# Patient Record
Sex: MALE | Race: BLACK OR AFRICAN AMERICAN | Employment: UNEMPLOYED | URBAN - METROPOLITAN AREA
[De-identification: names, ages, dates, MRNs, and addresses within clinical notes are randomized per-mention and may not be internally consistent; named-entity substitution may affect disease eponyms.]

---

## 2017-01-03 ENCOUNTER — HOSPITAL ENCOUNTER (EMERGENCY)
Age: 32
Discharge: HOME | End: 2017-01-03
Attending: EMERGENCY MEDICINE | Admitting: EMERGENCY MEDICINE
Payer: MEDICARE

## 2017-01-03 ENCOUNTER — APPOINTMENT (OUTPATIENT)
Dept: CT IMAGING | Age: 32
End: 2017-01-03
Attending: EMERGENCY MEDICINE
Payer: MEDICARE

## 2017-01-03 VITALS
TEMPERATURE: 98.3 F | BODY MASS INDEX: 25.26 KG/M2 | WEIGHT: 157.19 LBS | HEART RATE: 85 BPM | RESPIRATION RATE: 16 BRPM | OXYGEN SATURATION: 100 % | DIASTOLIC BLOOD PRESSURE: 89 MMHG | SYSTOLIC BLOOD PRESSURE: 123 MMHG | HEIGHT: 66 IN

## 2017-01-03 DIAGNOSIS — N12 PYELONEPHRITIS: Primary | ICD-10-CM

## 2017-01-03 DIAGNOSIS — Q62.7 CONGENITAL VESICOURETERAL REFLUX: ICD-10-CM

## 2017-01-03 DIAGNOSIS — N18.6 ESRD (END STAGE RENAL DISEASE) (HCC): ICD-10-CM

## 2017-01-03 DIAGNOSIS — N13.30 BILATERAL HYDRONEPHROSIS: ICD-10-CM

## 2017-01-03 LAB
ALBUMIN SERPL BCP-MCNC: 3.4 G/DL (ref 3.5–5)
ALBUMIN/GLOB SERPL: 0.6 {RATIO} (ref 1.1–2.2)
ALP SERPL-CCNC: 85 U/L (ref 45–117)
ALT SERPL-CCNC: 14 U/L (ref 12–78)
ANION GAP BLD CALC-SCNC: 11 MMOL/L (ref 5–15)
APPEARANCE UR: ABNORMAL
AST SERPL W P-5'-P-CCNC: 17 U/L (ref 15–37)
BACTERIA URNS QL MICRO: ABNORMAL /HPF
BASOPHILS # BLD AUTO: 0 K/UL (ref 0–0.1)
BASOPHILS # BLD: 0 % (ref 0–1)
BILIRUB SERPL-MCNC: 0.4 MG/DL (ref 0.2–1)
BILIRUB UR QL: NEGATIVE
BUN SERPL-MCNC: 82 MG/DL (ref 6–20)
BUN/CREAT SERPL: 9 (ref 12–20)
CALCIUM SERPL-MCNC: 7.4 MG/DL (ref 8.5–10.1)
CHLORIDE SERPL-SCNC: 112 MMOL/L (ref 97–108)
CO2 SERPL-SCNC: 17 MMOL/L (ref 21–32)
COLOR UR: ABNORMAL
CREAT SERPL-MCNC: 9.63 MG/DL (ref 0.7–1.3)
EOSINOPHIL # BLD: 0.1 K/UL (ref 0–0.4)
EOSINOPHIL NFR BLD: 1 % (ref 0–7)
EPITH CASTS URNS QL MICRO: ABNORMAL /LPF
ERYTHROCYTE [DISTWIDTH] IN BLOOD BY AUTOMATED COUNT: 13.9 % (ref 11.5–14.5)
GLOBULIN SER CALC-MCNC: 5.6 G/DL (ref 2–4)
GLUCOSE SERPL-MCNC: 93 MG/DL (ref 65–100)
GLUCOSE UR STRIP.AUTO-MCNC: NEGATIVE MG/DL
HCT VFR BLD AUTO: 40.3 % (ref 36.6–50.3)
HGB BLD-MCNC: 13.7 G/DL (ref 12.1–17)
HGB UR QL STRIP: ABNORMAL
KETONES UR QL STRIP.AUTO: NEGATIVE MG/DL
LEUKOCYTE ESTERASE UR QL STRIP.AUTO: ABNORMAL
LYMPHOCYTES # BLD AUTO: 31 % (ref 12–49)
LYMPHOCYTES # BLD: 3 K/UL (ref 0.8–3.5)
MCH RBC QN AUTO: 30 PG (ref 26–34)
MCHC RBC AUTO-ENTMCNC: 34 G/DL (ref 30–36.5)
MCV RBC AUTO: 88.2 FL (ref 80–99)
MONOCYTES # BLD: 0.9 K/UL (ref 0–1)
MONOCYTES NFR BLD AUTO: 9 % (ref 5–13)
NEUTS SEG # BLD: 5.7 K/UL (ref 1.8–8)
NEUTS SEG NFR BLD AUTO: 59 % (ref 32–75)
NITRITE UR QL STRIP.AUTO: NEGATIVE
PH UR STRIP: 6 [PH] (ref 5–8)
PLATELET # BLD AUTO: 267 K/UL (ref 150–400)
POTASSIUM SERPL-SCNC: 4.1 MMOL/L (ref 3.5–5.1)
PROT SERPL-MCNC: 9 G/DL (ref 6.4–8.2)
PROT UR STRIP-MCNC: 100 MG/DL
RBC # BLD AUTO: 4.57 M/UL (ref 4.1–5.7)
RBC #/AREA URNS HPF: ABNORMAL /HPF (ref 0–5)
SODIUM SERPL-SCNC: 140 MMOL/L (ref 136–145)
SP GR UR REFRACTOMETRY: 1.01 (ref 1–1.03)
UROBILINOGEN UR QL STRIP.AUTO: 0.2 EU/DL (ref 0.2–1)
WBC # BLD AUTO: 9.7 K/UL (ref 4.1–11.1)
WBC URNS QL MICRO: >100 /HPF (ref 0–4)

## 2017-01-03 PROCEDURE — 80053 COMPREHEN METABOLIC PANEL: CPT | Performed by: EMERGENCY MEDICINE

## 2017-01-03 PROCEDURE — 99283 EMERGENCY DEPT VISIT LOW MDM: CPT

## 2017-01-03 PROCEDURE — 85025 COMPLETE CBC W/AUTO DIFF WBC: CPT | Performed by: EMERGENCY MEDICINE

## 2017-01-03 PROCEDURE — 36415 COLL VENOUS BLD VENIPUNCTURE: CPT | Performed by: EMERGENCY MEDICINE

## 2017-01-03 PROCEDURE — 96365 THER/PROPH/DIAG IV INF INIT: CPT

## 2017-01-03 PROCEDURE — 74011250636 HC RX REV CODE- 250/636: Performed by: EMERGENCY MEDICINE

## 2017-01-03 PROCEDURE — 74011000258 HC RX REV CODE- 258: Performed by: EMERGENCY MEDICINE

## 2017-01-03 PROCEDURE — 81001 URINALYSIS AUTO W/SCOPE: CPT | Performed by: EMERGENCY MEDICINE

## 2017-01-03 PROCEDURE — 74176 CT ABD & PELVIS W/O CONTRAST: CPT

## 2017-01-03 PROCEDURE — 96375 TX/PRO/DX INJ NEW DRUG ADDON: CPT

## 2017-01-03 RX ORDER — CEPHALEXIN 250 MG/1
250 CAPSULE ORAL DAILY
Qty: 7 CAP | Refills: 0 | Status: SHIPPED | OUTPATIENT
Start: 2017-01-03 | End: 2017-01-03

## 2017-01-03 RX ORDER — MORPHINE SULFATE 2 MG/ML
6 INJECTION, SOLUTION INTRAMUSCULAR; INTRAVENOUS
Status: COMPLETED | OUTPATIENT
Start: 2017-01-03 | End: 2017-01-03

## 2017-01-03 RX ORDER — HYDROCODONE BITARTRATE AND ACETAMINOPHEN 7.5; 325 MG/1; MG/1
1 TABLET ORAL
Status: DISCONTINUED | OUTPATIENT
Start: 2017-01-03 | End: 2017-01-04 | Stop reason: HOSPADM

## 2017-01-03 RX ORDER — CEPHALEXIN 250 MG/1
250 CAPSULE ORAL DAILY
Qty: 7 CAP | Refills: 0 | Status: SHIPPED | OUTPATIENT
Start: 2017-01-03 | End: 2017-01-10

## 2017-01-03 RX ORDER — HYDROCODONE BITARTRATE AND ACETAMINOPHEN 7.5; 325 MG/1; MG/1
1 TABLET ORAL
Qty: 10 TAB | Refills: 0 | Status: ON HOLD | OUTPATIENT
Start: 2017-01-03 | End: 2018-09-21

## 2017-01-03 RX ORDER — ONDANSETRON 2 MG/ML
4 INJECTION INTRAMUSCULAR; INTRAVENOUS
Status: COMPLETED | OUTPATIENT
Start: 2017-01-03 | End: 2017-01-03

## 2017-01-03 RX ADMIN — Medication 6 MG: at 21:22

## 2017-01-03 RX ADMIN — ONDANSETRON 4 MG: 2 INJECTION INTRAMUSCULAR; INTRAVENOUS at 21:22

## 2017-01-03 RX ADMIN — CEFTRIAXONE 1 G: 1 INJECTION, POWDER, FOR SOLUTION INTRAMUSCULAR; INTRAVENOUS at 21:22

## 2017-01-04 NOTE — ED NOTES
Pt discharged with written instructions and prescriptions at this time. Pt verbalizes understanding and all questions were answered. Discharged by Winnie Marx, in stable condition, ambulatory.

## 2017-01-04 NOTE — ED PROVIDER NOTES
HPI Comments: Caryle Dach is a 28 y.o. male with PMHx significant for ESRD who presents ambulatory to [id number] [facility] ED with cc of intermittent R flank and RLQ abdominal pain for 2 months, worse in the past 2-3 weeks. Pt describes the pain as a throbbing sensation that occurs with urination or sudden movements. Pt acknowledges additional symptoms of nausea with PO intake, 1 episode of vomiting yesterday, ~4-5 episodes of diarrhea/day for 2 days, decreased appetite, subjective fever, and intermittent diaphoresis. He notes having an episode of hematuria last week, but none since. Pt denies hx of appendectomy or cholecystectomy. Of note, pt has been noncompliant with his dialysis which he has not been to in over 1 year, but is trying to re-start dialysis. Pt denies dysuria, cough, CP, or back pain. PCP: Lui Ferrara MD  Nephrology: Dr Stubbs/Dr Erendira Campoverde Hx: + tobacco (every day), -EtOH (-), - illicit drugs    There are no other complaints, changes or physical findings at this time. The history is provided by the patient. No  was used.         Past Medical History:   Diagnosis Date    Acute renal failure (ARF) ([address] 7/30/2015    Chronic kidney disease      pt on hemodialysis d/t reflux    Congenital hydroureteronephrosis     Crohn disease ([address]     DVT (deep venous thrombosis) (HCC)     ESRD (end stage renal disease) ([facility]      on HD [id number]    Gastrointestinal disorder      Chron's    VUR (vesicoureteric reflux)        Past Surgical History:   Procedure Laterality Date    Hx other surgical  Pt has a filter for DVT's    Hx other surgical       Marquis placed 3 weeks ago    Hx vascular access  1/14/14     CREATION LEFT UPPPER ARM ARTERIO VENOUS GRAFT   (7mm PTFE)         Family History:   Problem Relation Age of Onset    Heart Disease Other     Kidney Disease Other      kidney stones       Social History     Social History    Marital status:      Spouse name: N/A   [facility] Number of children: N/A    Years of education: N/A     Occupational History    Not on file. Social History Main Topics    Smoking status: Current Every Day Smoker    Smokeless tobacco: Never Used      Comment: quit about a year ago     Alcohol use No    Drug use: No    Sexual activity: Not on file     Other Topics Concern    Not on file     Social History Narrative         ALLERGIES: Codeine; Iodinated contrast media - oral and iv dye; and Shellfish derived    Review of Systems   Constitutional: Positive for appetite change (decrease), diaphoresis and fever (subjective). Negative for chills and fatigue. HENT: Negative for congestion, rhinorrhea and sore throat. Eyes: Negative for pain, discharge and visual disturbance. Respiratory: Negative for cough, chest tightness, shortness of breath and wheezing. Cardiovascular: Negative for chest pain, palpitations and leg swelling. Gastrointestinal: Positive for abdominal pain (RLQ), diarrhea, nausea and vomiting. Negative for constipation. Genitourinary: Positive for flank pain (right). Negative for dysuria, frequency and hematuria. Musculoskeletal: Negative for arthralgias, back pain and myalgias. Skin: Negative for rash. Neurological: Negative for dizziness, weakness, light-headedness and headaches. Psychiatric/Behavioral: Negative. Vitals:    01/03/17 1750 01/03/17 2130   BP: (!) 150/95 123/89   Pulse: 85    Resp: 16    Temp: 98.3 °F (36.8 °C)    SpO2: 100% 100%   Weight: 71.3 kg (157 lb 3 oz)    Height: 5' 6\" (1.676 m)             Physical Exam   Constitutional: He is oriented to person, place, and time. He appears well-developed and well-nourished. No distress. HENT:   Head: Normocephalic and atraumatic. Eyes: EOM are normal. Right eye exhibits no discharge. Left eye exhibits no discharge. No scleral icterus. Neck: Normal range of motion. Neck supple. No tracheal deviation present.    Cardiovascular: Normal rate, regular rhythm, normal heart sounds and intact distal pulses. Exam reveals no gallop and no friction rub. No murmur heard. Pulmonary/Chest: Effort normal and breath sounds normal. No respiratory distress. He has no wheezes. He has no rales. Abdominal: Soft. He exhibits no distension. There is no rebound and no guarding. Diffuse right sided abdominal tenderness, Right flank tenderness   Musculoskeletal: Normal range of motion. He exhibits no edema. Lymphadenopathy:     He has no cervical adenopathy. Neurological: He is alert and oriented to person, place, and time. Skin: Skin is warm and dry. No rash noted. Psychiatric: He has a normal mood and affect. MDM  Number of Diagnoses or Management Options  Bilateral hydronephrosis:   Congenital vesicoureteral reflux:   ESRD (end stage renal disease) ([facility]):   Pyelonephritis:   Diagnosis management comments:     Patient presents to ED with right flank pain. He has a history of UTIs secondary to vesicoureteral reflux. He has also been noncompliant with HD. Differential includes pyelonephritis, UTI, nephrolithiasis, appendicitis, cholecystitis, choledocholithiasis, bowel obstruction  - CBC, CMP, UA  - Renal colic CT  - Discuss with nephrology to arrange follow up for HD; he has no current access  - Symptomatic management and reevaluate         Amount and/or Complexity of Data Reviewed  Clinical lab tests: ordered and reviewed  Tests in the radiology section of CPT®: ordered and reviewed  Review and summarize past medical records: yes  Discuss the patient with other providers: yes (Nephrology.)    Patient Progress  Patient progress: stable    ED Course       Procedures      CONSULT NOTE:   9:19 PM  Patrick Marquez MD spoke with Dr. Patrizia Key,   Specialty: nephrology   Discussed pt's hx, disposition, and available diagnostic and imaging results. Reviewed care plans. Consultant agrees with plans as outlined.   Dr. Patrizia Key agrees to evaluate pt and give him her card for further follow-up  Written by MARYA Pierce, as dictated by Rc Hernandez MD.    PROGRESS NOTE:  74:52 PM  Renal colic CT indicates bilateral hydronephrosis but this is consistent with prior CT scans. Dr. Jason Casey has evaluated patient and states he does not need emergent HD; she provided him with information for follow up. Will treat for pyelonephritis; patient is nontoxic appearing and afebrile so will treat on outpatient basis. .  Discussed dosing of keflex with pharmacist.  Creatinine clearance is 11. Will treat with 250 mg of keflex daily. Discussed results, prescriptions and follow up plan with patient. Provided customary return to ED instructions. Patient expressed understanding. Cathy Hoskins MD     LABORATORY TESTS:  Recent Results (from the past 12 hour(s))   URINALYSIS W/MICROSCOPIC    Collection Time: 01/03/17  6:06 PM   Result Value Ref Range    Color YELLOW/STRAW      Appearance TURBID (A) CLEAR      Specific gravity 1.010 1.003 - 1.030      pH (UA) 6.0 5.0 - 8.0      Protein 100 (A) NEG mg/dL    Glucose NEGATIVE  NEG mg/dL    Ketone NEGATIVE  NEG mg/dL    Bilirubin NEGATIVE  NEG      Blood LARGE (A) NEG      Urobilinogen 0.2 0.2 - 1.0 EU/dL    Nitrites NEGATIVE  NEG      Leukocyte Esterase LARGE (A) NEG      WBC >100 (H) 0 - 4 /hpf    RBC 5-10 0 - 5 /hpf    Epithelial cells MODERATE (A) FEW /lpf    Bacteria 2+ (A) NEG /hpf   CBC WITH AUTOMATED DIFF    Collection Time: 01/03/17  6:34 PM   Result Value Ref Range    WBC 9.7 4.1 - 11.1 K/uL    RBC 4.57 4.10 - 5.70 M/uL    HGB 13.7 12.1 - 17.0 g/dL    HCT 40.3 36.6 - 50.3 %    MCV 88.2 80.0 - 99.0 FL    MCH 30.0 26.0 - 34.0 PG    MCHC 34.0 30.0 - 36.5 g/dL    RDW 13.9 11.5 - 14.5 %    PLATELET 378 762 - 665 K/uL    NEUTROPHILS 59 32 - 75 %    LYMPHOCYTES 31 12 - 49 %    MONOCYTES 9 5 - 13 %    EOSINOPHILS 1 0 - 7 %    BASOPHILS 0 0 - 1 %    ABS. NEUTROPHILS 5.7 1.8 - 8.0 K/UL    ABS.  LYMPHOCYTES 3.0 0.8 - 3.5 K/UL ABS. MONOCYTES 0.9 0.0 - 1.0 K/UL    ABS. EOSINOPHILS 0.1 0.0 - 0.4 K/UL    ABS. BASOPHILS 0.0 0.0 - 0.1 K/UL   METABOLIC PANEL, COMPREHENSIVE    Collection Time: 01/03/17  6:34 PM   Result Value Ref Range    Sodium 140 136 - 145 mmol/L    Potassium 4.1 3.5 - 5.1 mmol/L    Chloride 112 (H) 97 - 108 mmol/L    CO2 17 (L) 21 - 32 mmol/L    Anion gap 11 5 - 15 mmol/L    Glucose 93 65 - 100 mg/dL    BUN 82 (H) 6 - 20 MG/DL    Creatinine 9.63 (H) 0.70 - 1.30 MG/DL    BUN/Creatinine ratio 9 (L) 12 - 20      GFR est AA 8 (L) >60 ml/min/1.73m2    GFR est non-AA 6 (L) >60 ml/min/1.73m2    Calcium 7.4 (L) 8.5 - 10.1 MG/DL    Bilirubin, total 0.4 0.2 - 1.0 MG/DL    ALT 14 12 - 78 U/L    AST 17 15 - 37 U/L    Alk. phosphatase 85 45 - 117 U/L    Protein, total 9.0 (H) 6.4 - 8.2 g/dL    Albumin 3.4 (L) 3.5 - 5.0 g/dL    Globulin 5.6 (H) 2.0 - 4.0 g/dL    A-G Ratio 0.6 (L) 1.1 - 2.2         IMAGING RESULTS:  CT ABD PELV WO CONT   Final Result   Study Result      EXAM: CT ABDOMEN PELVIS WITHOUT CONTRAST  INDICATION: right flank pain, right abdominal pain  COMPARISON: None. .  TECHNIQUE:   Unenhanced multislice helical CT was performed from the diaphragm to the  symphysis pubis without intravenous contrast administration. Contiguous 5 mm  axial images were reconstructed and lung and soft tissue windows were generated. Coronal and sagittal reformations were generated. CT dose reduction was achieved through use of a standardized protocol tailored  for this examination and automatic exposure control for dose modulation. Jessee Wilkerson FINDINGS:  INCIDENTALLY IMAGED CHEST:  Heart/vessels: Within normal limits. Lungs/Pleura: Within normal limits. .  ABDOMEN:  Liver: Small, low-attenuation lesion in the anterior aspect of the right lobe of  the liver which is incompletely characterized and likely too small to actually  characterize. Gallbladder/Biliary: Within normal limits. Spleen: Within normal limits.   Pancreas: Within normal limits. Adrenals: Within normal limits. Kidneys: Marked, bilateral hydronephrosis. Peritoneum/Mesenteries: Within normal limits. Extraperitoneum: Within normal limits. Gastrointestinal tract: Suture material adjacent to the cecum/terminal ileum. Vascular: IVC filter. Meron Ragland PELVIS:  Extraperitoneum: Calcifications in the floor the pelvis suggesting phleboliths. Ureters: Bilateral hydroureter. Bladder: Small bladder with irregular contour and lumen. Reproductive System: Within normal limits. .  MSK:   Within normal limits. .  IMPRESSION  IMPRESSION:   1. Bilateral hydroureteronephrosis. The bladder appears small with an irregular  contour and lumen. Recommend clinical correlation. Recommend comparison with  previous imaging  2. IVC filter present. 3. Postsurgical changes of the terminal ileum/cecum.                MEDICATIONS GIVEN:  Medications   cefTRIAXone (ROCEPHIN) 1 g in 0.9% sodium chloride (MBP/ADV) 50 mL (0 g IntraVENous IV Completed 1/3/17 2152)   morphine injection 6 mg (6 mg IntraVENous Given 1/3/17 2122)   ondansetron (ZOFRAN) injection 4 mg (4 mg IntraVENous Given 1/3/17 2122)       IMPRESSION:  1. Pyelonephritis    2. ESRD (end stage renal disease) ([facility]    3. Congenital vesicoureteral reflux    4. Bilateral hydronephrosis        PLAN:  1. Current Discharge Medication List      START taking these medications    Details   cephALEXin (KEFLEX) 250 mg capsule Take 1 Cap by mouth daily for 7 days. Qty: 7 Cap, Refills: 0      HYDROcodone-acetaminophen (NORCO) 7.5-325 mg per tablet Take 1 Tab by mouth every six (6) hours as needed for Pain. Max Daily Amount: 4 Tabs. Qty: 10 Tab, Refills: 0         CONTINUE these medications which have NOT CHANGED    Details   warfarin (COUMADIN) 5 mg tablet Take 5 mg by mouth daily. apixaban (ELIQUIS) 5 mg tablet Take 1 Tab by mouth two (2) times a day.   Qty: 60 Tab, Refills: 0         STOP taking these medications       HYDROcodone-acetaminophen (NORCO) 5-325 mg per tablet Comments:   Reason for Stoppin.   Follow-up Information     Follow up With Details Comments Contact Info    Lui Ferrara MD In 2 days Please follow up with your primary care provider for reevaluation Patient can only remember the practice name and not the physician      Jovany Bravo MD  Please follow up with nephrology to discuss dialysis as soon as possible [address] [facility] [address]  [facility]  [phone]      [facility] EMERGENCY DEPT  As needed, If symptoms worsen [address] [place] [address]  [phone]        Return to ED if worse     DISCHARGE NOTE  11:01 PM  The patient has been re-evaluated and is ready for discharge. Reviewed available results with patient. Counseled pt on diagnosis and care plan. Pt has expressed understanding, and all questions have been answered. Pt agrees with plan and agrees to F/U as recommended, or return to the ED if their sxs worsen. Discharge instructions have been provided and explained to the pt, along with reasons to return to the ED. This note is prepared by Siddharth Kelly acting as Scribe for Anabella Noel MD.    Anabella Noel MD: The scribe's documentation has been prepared under my direction and personally reviewed by me in its entirety. I confirm that the note above accurately reflects all work, treatment, procedures, and medical decision making performed by me.

## 2017-01-04 NOTE — ED NOTES
Assumed care of pt at this time, received bedside report from [facility], [address] [place] [address] with pt included in care. Pt is resting in room, with call bell in reach. All questions answered.

## 2017-01-04 NOTE — CONSULTS
Renal -    Asked to see Mr. Sergio Rodriguez prior to discharge from the ER. Mr. Sergio Rodriguez has a h/o esrd. He has been on HD in the past, but has stopped HD on his own on a couple of occasions. He has been discharged from [facility] in the past (more than once). Has been seen at [facility] in the past as well. Per the chart and per Mr. Sergio Rodriguez he has had very poor adherence. He states that his support system is much better and he understands the need for HD much better now. He had a significant other with him who stated she would help make sure he attended HD. No acute need for RRT at this time. He lives near the [facility] HD unit. I will discuss with Dr. Eldred Angelucci tomorrow.       Cr Zaragoza

## 2017-01-04 NOTE — DISCHARGE INSTRUCTIONS
Kidney Infection: Care Instructions  Your Care Instructions  A kidney infection (pyelonephritis) is a type of urinary tract infection, or UTI. Most UTIs are bladder infections. Kidney infections tend to make people much sicker than bladder infections do. A kidney infection is also more serious because it can cause lasting damage if it is not treated quickly. Follow-up care is a key part of your treatment and safety. Be sure to make and go to all appointments, and call your doctor if you are having problems. Its also a good idea to know your test results and keep a list of the medicines you take. How can you care for yourself at home? · Take your antibiotics as directed. Do not stop taking them just because you feel better. You need to take the full course of antibiotics. · Drink plenty of water, enough so that your urine is light yellow or clear like water. This may help wash out bacteria that are causing the infection. If you have kidney, heart, or liver disease and have to limit fluids, talk with your doctor before you increase the amount of fluids you drink. · Urinate often. Try to empty your bladder each time. · To relieve pain, take a hot shower or lay a heating pad (set on low) over your lower belly. Never go to sleep with a heating pad in place. Put a thin cloth between the heating pad and your skin. To help prevent kidney infections  · Drink plenty of water each day. This helps you urinate often, which clears bacteria from your system. If you have kidney, heart, or liver disease and have to limit fluids, talk with your doctor before you increase the amount of fluids you drink. · Include cranberry juice in your diet. · Urinate when you have the urge. Do not hold your urine for a long time. Urinate before you go to sleep. · If you have symptoms of a bladder infection, such as burning when you urinate or having to urinate often, call your doctor so you can treat the problem before it gets worse. If you do not treat a bladder infection quickly, it can spread to the kidney. · Men should keep the tip of the penis clean. If you are a woman, keep these ideas in mind:  · Urinate right after you have sex. · Change sanitary pads often. Avoid douches, feminine hygiene sprays, and other feminine hygiene products that have deodorants. · After going to the bathroom, wipe from front to back. When should you call for help? Call your doctor now or seek immediate medical care if:  · You have increasing pain in your back just below the rib cage. This is called flank pain. · You have a new or higher fever and chills. · You are vomiting or nauseated. Watch closely for changes in your health, and be sure to contact your doctor if:  · Symptoms, such as burning when you urinate, get worse or get better but then come back. · You are not getting better after 2 days. Where can you learn more? Go to http://roselyn-francia.info/. Enter Y514 in the search box to learn more about \"Kidney Infection: Care Instructions. \"  Current as of: November 20, 2015  Content Version: 11.1  © 9203-9995 [employer]. Care instructions adapted under license by [employer] (which disclaims liability or warranty for this information). If you have questions about a medical condition or this instruction, always ask your healthcare professional. [place] [address] any warranty or liability for your use of this information.

## 2018-09-20 NOTE — PERIOP NOTES
[facility]  Preoperative Instructions        Surgery Date 9/21/18          Time of Arrival 5:45am     1. On the day of your surgery, please report to the Surgical Services Registration Desk and sign in at your designated time. The Surgery Center is located to the right of the Emergency Room. 2. You must have someone with you to drive you home. You should not drive a car for 24 hours following surgery. Please make arrangements for a friend or family member to stay with you for the first 24 hours after your surgery. 3. Do not have anything to eat or drink (including water, gum, mints, coffee, juice) after midnight 9/20/18      . ? This may not apply to medications prescribed by your physician. ?(Please note below the special instructions with medications to take the morning of your procedure.)    4. We recommend you do not drink any alcoholic beverages for 24 hours before and after your surgery. 5. Contact your surgeons office for instructions on the following medications: non-steroidal anti-inflammatory drugs (i.e. Advil, Aleve), vitamins, and supplements. (Some surgeons will want you to stop these medications prior to surgery and others may allow you to take them)  **If you are currently taking Plavix, Coumadin, Aspirin and/or other blood-thinning agents, contact your surgeon for instructions. ** Your surgeon will partner with the physician prescribing these medications to determine if it is safe to stop or if you need to continue taking. Please do not stop taking these medications without instructions from your surgeon    6. Wear comfortable clothes. Wear glasses instead of contacts. Do not bring any money or jewelry. Please bring picture ID, insurance card, and any prearranged co-payment or hospital payment. Do not wear make-up, particularly mascara the morning of your surgery. Do not wear nail polish, particularly if you are having foot /hand surgery.   Wear your hair loose or down, no ponytails, buns, ella pins or clips. All body piercings must be removed. Please shower with antibacterial soap for three consecutive days before and on the morning of surgery, but do not apply any lotions, powders or deodorants after the shower on the day of surgery. Please use a fresh towels after each shower. Please sleep in clean clothes and change bed linens the night before surgery. Please do not shave for 48 hours prior to surgery. Shaving of the face is acceptable. 7. You should understand that if you do not follow these instructions your surgery may be cancelled. If your physical condition changes (I.e. fever, cold or flu) please contact your surgeon as soon as possible. 8. It is important that you be on time. If a situation occurs where you may be late, please call [phone] (OR Holding Area). 9. If you have any questions and or problems, please call [phone] (Pre-admission Testing). 10. Your surgery time may be subject to change. You will receive a phone call the evening prior if your time changes. 11.  If having outpatient surgery, you must have someone to drive you here, stay with you during the duration of your stay, and to drive you home at time of discharge. 12.   In an effort to improve the efficiency, privacy, and safety for all of our Pre-op patients visitors are not allowed in the Holding area. Once you arrive and are registered your family/visitors will be asked to remain in the waiting room. The Pre-op staff will get you from the Surgical Waiting Area and will explain to you and your family/visitors that the Pre-op phase is beginning. The staff will answer any questions and provide instructions for tracking of the patient, by use of the existing tracking number and color-coded status board in the waiting room.   At this time the staff will also ask for your designated spokesperson information in the event that the physician or staff need to provide an update or obtain any pertinent information. The designated spokesperson will be notified if the physician needs to speak to family during the pre-operative phase. If at any time your family/visitors has questions or concerns they may approach the volunteer desk in the waiting area for assistance. Special Instructions:    MEDICATIONS TO TAKE THE MORNING OF SURGERY WITH A SIP OF WATER: None      I understand a pre-operative phone call will be made to verify my surgery time. In the event that I am not available, I give permission for a message to be left on my answering service and/or with another person?  Yes          ___________________      __________   _________    (Signature of Patient)             (Witness)                (Date and Time)

## 2018-09-20 NOTE — PERIOP NOTES
Pt assessment complete. Pt stated he \"did not have an EKG\". Last EKG in CC is from 2015. Pt at dialysis currently and can not have EKG done today. Dr Salomón Ferrara office called and made Ayad Casey aware. Office informed PAT to have EKG done AM DOS. PAT will attempt to see if any EKG has been done recently at [facility] (fax request sent). Both Dr Carolynn Ahumada office and pt's dialysis center were called and neither have any EKG.

## 2018-09-21 ENCOUNTER — ANESTHESIA EVENT (OUTPATIENT)
Dept: SURGERY | Age: 33
End: 2018-09-21
Payer: MEDICARE

## 2018-09-21 ENCOUNTER — HOSPITAL ENCOUNTER (OUTPATIENT)
Age: 33
Setting detail: OUTPATIENT SURGERY
Discharge: HOME | End: 2018-09-21
Attending: SURGERY | Admitting: SURGERY
Payer: MEDICARE

## 2018-09-21 ENCOUNTER — ANESTHESIA (OUTPATIENT)
Dept: SURGERY | Age: 33
End: 2018-09-21
Payer: MEDICARE

## 2018-09-21 VITALS
BODY MASS INDEX: 23.51 KG/M2 | SYSTOLIC BLOOD PRESSURE: 117 MMHG | DIASTOLIC BLOOD PRESSURE: 62 MMHG | RESPIRATION RATE: 16 BRPM | HEIGHT: 69 IN | TEMPERATURE: 97.7 F | WEIGHT: 158.73 LBS | OXYGEN SATURATION: 99 % | HEART RATE: 74 BPM

## 2018-09-21 DIAGNOSIS — Z99.2 ESRD ON DIALYSIS (HCC): Primary | ICD-10-CM

## 2018-09-21 DIAGNOSIS — N18.6 ESRD ON DIALYSIS (HCC): Primary | ICD-10-CM

## 2018-09-21 LAB
ANION GAP BLD CALC-SCNC: 19 MMOL/L (ref 10–20)
ATRIAL RATE: 53 BPM
BUN BLD-MCNC: 18 MG/DL (ref 9–20)
CA-I BLD-MCNC: 0.94 MMOL/L (ref 1.12–1.32)
CALCULATED P AXIS, ECG09: 67 DEGREES
CALCULATED R AXIS, ECG10: 16 DEGREES
CALCULATED T AXIS, ECG11: 45 DEGREES
CHLORIDE BLD-SCNC: 102 MMOL/L (ref 98–107)
CO2 BLD-SCNC: 27 MMOL/L (ref 21–32)
CREAT BLD-MCNC: 5.5 MG/DL (ref 0.6–1.3)
DIAGNOSIS, 93000: NORMAL
GLUCOSE BLD-MCNC: 121 MG/DL (ref 65–100)
HCT VFR BLD CALC: 38 % (ref 36.6–50.3)
P-R INTERVAL, ECG05: 144 MS
POTASSIUM BLD-SCNC: 3.7 MMOL/L (ref 3.5–5.1)
Q-T INTERVAL, ECG07: 478 MS
QRS DURATION, ECG06: 102 MS
QTC CALCULATION (BEZET), ECG08: 448 MS
SERVICE CMNT-IMP: ABNORMAL
SODIUM BLD-SCNC: 142 MMOL/L (ref 136–145)
VENTRICULAR RATE, ECG03: 53 BPM

## 2018-09-21 PROCEDURE — 74011250636 HC RX REV CODE- 250/636

## 2018-09-21 PROCEDURE — 93005 ELECTROCARDIOGRAM TRACING: CPT

## 2018-09-21 PROCEDURE — 77030018836 HC SOL IRR NACL ICUM -A: Performed by: SURGERY

## 2018-09-21 PROCEDURE — 76060000034 HC ANESTHESIA 1.5 TO 2 HR: Performed by: SURGERY

## 2018-09-21 PROCEDURE — 74011000250 HC RX REV CODE- 250: Performed by: SURGERY

## 2018-09-21 PROCEDURE — 74011250636 HC RX REV CODE- 250/636: Performed by: SURGERY

## 2018-09-21 PROCEDURE — 77030002996 HC SUT SLK J&J -A: Performed by: SURGERY

## 2018-09-21 PROCEDURE — 77030026438 HC STYL ET INTUB CARD -A: Performed by: NURSE ANESTHETIST, CERTIFIED REGISTERED

## 2018-09-21 PROCEDURE — 74011250636 HC RX REV CODE- 250/636: Performed by: ANESTHESIOLOGY

## 2018-09-21 PROCEDURE — 77030020153 HC PRB DOPLR DISP MIZU -C: Performed by: SURGERY

## 2018-09-21 PROCEDURE — 77030002987 HC SUT PROL J&J -B: Performed by: SURGERY

## 2018-09-21 PROCEDURE — 74011000250 HC RX REV CODE- 250

## 2018-09-21 PROCEDURE — 76210000017 HC OR PH I REC 1.5 TO 2 HR: Performed by: SURGERY

## 2018-09-21 PROCEDURE — 77030002986 HC SUT PROL J&J -A: Performed by: SURGERY

## 2018-09-21 PROCEDURE — 76210000020 HC REC RM PH II FIRST 0.5 HR: Performed by: SURGERY

## 2018-09-21 PROCEDURE — 77030031139 HC SUT VCRL2 J&J -A: Performed by: SURGERY

## 2018-09-21 PROCEDURE — 76010000153 HC OR TIME 1.5 TO 2 HR: Performed by: SURGERY

## 2018-09-21 PROCEDURE — 74011000272 HC RX REV CODE- 272: Performed by: SURGERY

## 2018-09-21 PROCEDURE — 77030008684 HC TU ET CUF COVD -B: Performed by: NURSE ANESTHETIST, CERTIFIED REGISTERED

## 2018-09-21 PROCEDURE — 77030002916 HC SUT ETHLN J&J -A: Performed by: SURGERY

## 2018-09-21 PROCEDURE — 77030020782 HC GWN BAIR PAWS FLX 3M -B

## 2018-09-21 PROCEDURE — 77030010120 HC SHR COAG HARMO J&J -E: Performed by: SURGERY

## 2018-09-21 PROCEDURE — 77030020256 HC SOL INJ NACL 0.9%  500ML: Performed by: SURGERY

## 2018-09-21 PROCEDURE — 77030002924 HC SUT GORTX WLGO -B: Performed by: SURGERY

## 2018-09-21 PROCEDURE — 77030011640 HC PAD GRND REM COVD -A: Performed by: SURGERY

## 2018-09-21 PROCEDURE — 77030019908 HC STETH ESOPH SIMS -A: Performed by: NURSE ANESTHETIST, CERTIFIED REGISTERED

## 2018-09-21 PROCEDURE — C1768 GRAFT, VASCULAR: HCPCS | Performed by: SURGERY

## 2018-09-21 PROCEDURE — 80047 BASIC METABLC PNL IONIZED CA: CPT

## 2018-09-21 PROCEDURE — 77030008463 HC STPLR SKN PROX J&J -B: Performed by: SURGERY

## 2018-09-21 DEVICE — PROPATEN VASCULAR GRAFT TW 7MMX40CM HEPARIN
Type: IMPLANTABLE DEVICE | Site: ARM | Status: FUNCTIONAL
Brand: GORE PROPATEN VASCULAR GRAFT

## 2018-09-21 RX ORDER — SODIUM CHLORIDE, SODIUM LACTATE, POTASSIUM CHLORIDE, CALCIUM CHLORIDE 600; 310; 30; 20 MG/100ML; MG/100ML; MG/100ML; MG/100ML
25 INJECTION, SOLUTION INTRAVENOUS CONTINUOUS
Status: DISCONTINUED | OUTPATIENT
Start: 2018-09-21 | End: 2018-09-21 | Stop reason: HOSPADM

## 2018-09-21 RX ORDER — CEFAZOLIN SODIUM/WATER 2 G/20 ML
2 SYRINGE (ML) INTRAVENOUS
Status: COMPLETED | OUTPATIENT
Start: 2018-09-21 | End: 2018-09-21

## 2018-09-21 RX ORDER — PROTAMINE SULFATE 10 MG/ML
INJECTION, SOLUTION INTRAVENOUS AS NEEDED
Status: DISCONTINUED | OUTPATIENT
Start: 2018-09-21 | End: 2018-09-21 | Stop reason: HOSPADM

## 2018-09-21 RX ORDER — SODIUM CHLORIDE 0.9 % (FLUSH) 0.9 %
5-10 SYRINGE (ML) INJECTION AS NEEDED
Status: DISCONTINUED | OUTPATIENT
Start: 2018-09-21 | End: 2018-09-21 | Stop reason: HOSPADM

## 2018-09-21 RX ORDER — LIDOCAINE HYDROCHLORIDE 20 MG/ML
INJECTION, SOLUTION EPIDURAL; INFILTRATION; INTRACAUDAL; PERINEURAL AS NEEDED
Status: DISCONTINUED | OUTPATIENT
Start: 2018-09-21 | End: 2018-09-21 | Stop reason: HOSPADM

## 2018-09-21 RX ORDER — MIDAZOLAM HYDROCHLORIDE 1 MG/ML
INJECTION, SOLUTION INTRAMUSCULAR; INTRAVENOUS AS NEEDED
Status: DISCONTINUED | OUTPATIENT
Start: 2018-09-21 | End: 2018-09-21 | Stop reason: HOSPADM

## 2018-09-21 RX ORDER — SODIUM CHLORIDE 0.9 % (FLUSH) 0.9 %
5-10 SYRINGE (ML) INJECTION EVERY 8 HOURS
Status: DISCONTINUED | OUTPATIENT
Start: 2018-09-21 | End: 2018-09-21 | Stop reason: HOSPADM

## 2018-09-21 RX ORDER — ONDANSETRON 2 MG/ML
INJECTION INTRAMUSCULAR; INTRAVENOUS AS NEEDED
Status: DISCONTINUED | OUTPATIENT
Start: 2018-09-21 | End: 2018-09-21 | Stop reason: HOSPADM

## 2018-09-21 RX ORDER — SODIUM CHLORIDE 9 MG/ML
25 INJECTION, SOLUTION INTRAVENOUS CONTINUOUS
Status: DISCONTINUED | OUTPATIENT
Start: 2018-09-21 | End: 2018-09-21 | Stop reason: HOSPADM

## 2018-09-21 RX ORDER — DIPHENHYDRAMINE HYDROCHLORIDE 50 MG/ML
12.5 INJECTION, SOLUTION INTRAMUSCULAR; INTRAVENOUS AS NEEDED
Status: DISCONTINUED | OUTPATIENT
Start: 2018-09-21 | End: 2018-09-21 | Stop reason: HOSPADM

## 2018-09-21 RX ORDER — HYDROMORPHONE HYDROCHLORIDE 1 MG/ML
0.2 INJECTION, SOLUTION INTRAMUSCULAR; INTRAVENOUS; SUBCUTANEOUS
Status: DISCONTINUED | OUTPATIENT
Start: 2018-09-21 | End: 2018-09-21 | Stop reason: HOSPADM

## 2018-09-21 RX ORDER — LIDOCAINE HYDROCHLORIDE 10 MG/ML
0.1 INJECTION, SOLUTION EPIDURAL; INFILTRATION; INTRACAUDAL; PERINEURAL AS NEEDED
Status: DISCONTINUED | OUTPATIENT
Start: 2018-09-21 | End: 2018-09-21 | Stop reason: HOSPADM

## 2018-09-21 RX ORDER — HYDROMORPHONE HYDROCHLORIDE 2 MG/1
2 TABLET ORAL
Qty: 20 TAB | Refills: 0 | Status: SHIPPED | OUTPATIENT
Start: 2018-09-21 | End: 2019-08-28

## 2018-09-21 RX ORDER — ONDANSETRON 2 MG/ML
INJECTION INTRAMUSCULAR; INTRAVENOUS
Status: COMPLETED
Start: 2018-09-21 | End: 2018-09-21

## 2018-09-21 RX ORDER — ROCURONIUM BROMIDE 10 MG/ML
INJECTION, SOLUTION INTRAVENOUS AS NEEDED
Status: DISCONTINUED | OUTPATIENT
Start: 2018-09-21 | End: 2018-09-21 | Stop reason: HOSPADM

## 2018-09-21 RX ORDER — CEFAZOLIN SODIUM 1 G/3ML
2 INJECTION, POWDER, FOR SOLUTION INTRAMUSCULAR; INTRAVENOUS ONCE
Status: DISCONTINUED | OUTPATIENT
Start: 2018-09-21 | End: 2018-09-21

## 2018-09-21 RX ORDER — PHENYLEPHRINE HCL IN 0.9% NACL 0.4MG/10ML
SYRINGE (ML) INTRAVENOUS AS NEEDED
Status: DISCONTINUED | OUTPATIENT
Start: 2018-09-21 | End: 2018-09-21 | Stop reason: HOSPADM

## 2018-09-21 RX ORDER — HEPARIN SODIUM 1000 [USP'U]/ML
INJECTION, SOLUTION INTRAVENOUS; SUBCUTANEOUS AS NEEDED
Status: DISCONTINUED | OUTPATIENT
Start: 2018-09-21 | End: 2018-09-21 | Stop reason: HOSPADM

## 2018-09-21 RX ORDER — ONDANSETRON 2 MG/ML
4 INJECTION INTRAMUSCULAR; INTRAVENOUS ONCE
Status: COMPLETED | OUTPATIENT
Start: 2018-09-21 | End: 2018-09-21

## 2018-09-21 RX ORDER — PROPOFOL 10 MG/ML
INJECTION, EMULSION INTRAVENOUS AS NEEDED
Status: DISCONTINUED | OUTPATIENT
Start: 2018-09-21 | End: 2018-09-21 | Stop reason: HOSPADM

## 2018-09-21 RX ORDER — FENTANYL CITRATE 50 UG/ML
INJECTION, SOLUTION INTRAMUSCULAR; INTRAVENOUS AS NEEDED
Status: DISCONTINUED | OUTPATIENT
Start: 2018-09-21 | End: 2018-09-21 | Stop reason: HOSPADM

## 2018-09-21 RX ORDER — SUCCINYLCHOLINE CHLORIDE 20 MG/ML
INJECTION INTRAMUSCULAR; INTRAVENOUS AS NEEDED
Status: DISCONTINUED | OUTPATIENT
Start: 2018-09-21 | End: 2018-09-21 | Stop reason: HOSPADM

## 2018-09-21 RX ORDER — FENTANYL CITRATE 50 UG/ML
25 INJECTION, SOLUTION INTRAMUSCULAR; INTRAVENOUS
Status: DISCONTINUED | OUTPATIENT
Start: 2018-09-21 | End: 2018-09-21 | Stop reason: HOSPADM

## 2018-09-21 RX ADMIN — FENTANYL CITRATE 25 MCG: 50 INJECTION, SOLUTION INTRAMUSCULAR; INTRAVENOUS at 10:18

## 2018-09-21 RX ADMIN — MEPERIDINE HYDROCHLORIDE 12.5 MG: 25 INJECTION, SOLUTION INTRAMUSCULAR; INTRAVENOUS; SUBCUTANEOUS at 10:11

## 2018-09-21 RX ADMIN — FENTANYL CITRATE 25 MCG: 50 INJECTION, SOLUTION INTRAMUSCULAR; INTRAVENOUS at 09:48

## 2018-09-21 RX ADMIN — FENTANYL CITRATE 25 MCG: 50 INJECTION, SOLUTION INTRAMUSCULAR; INTRAVENOUS at 09:45

## 2018-09-21 RX ADMIN — PROPOFOL 40 MG: 10 INJECTION, EMULSION INTRAVENOUS at 09:23

## 2018-09-21 RX ADMIN — Medication 2 G: at 07:58

## 2018-09-21 RX ADMIN — MIDAZOLAM HYDROCHLORIDE 2 MG: 1 INJECTION, SOLUTION INTRAMUSCULAR; INTRAVENOUS at 07:41

## 2018-09-21 RX ADMIN — PROTAMINE SULFATE 25 MG: 10 INJECTION, SOLUTION INTRAVENOUS at 09:11

## 2018-09-21 RX ADMIN — FENTANYL CITRATE 50 MCG: 50 INJECTION, SOLUTION INTRAMUSCULAR; INTRAVENOUS at 09:14

## 2018-09-21 RX ADMIN — SODIUM CHLORIDE 25 ML/HR: 900 INJECTION, SOLUTION INTRAVENOUS at 07:12

## 2018-09-21 RX ADMIN — HEPARIN SODIUM 5000 UNITS: 1000 INJECTION, SOLUTION INTRAVENOUS; SUBCUTANEOUS at 08:19

## 2018-09-21 RX ADMIN — ONDANSETRON 4 MG: 2 INJECTION INTRAMUSCULAR; INTRAVENOUS at 10:42

## 2018-09-21 RX ADMIN — LIDOCAINE HYDROCHLORIDE 80 MG: 20 INJECTION, SOLUTION EPIDURAL; INFILTRATION; INTRACAUDAL; PERINEURAL at 07:47

## 2018-09-21 RX ADMIN — FENTANYL CITRATE 25 MCG: 50 INJECTION, SOLUTION INTRAMUSCULAR; INTRAVENOUS at 09:58

## 2018-09-21 RX ADMIN — FENTANYL CITRATE 100 MCG: 50 INJECTION, SOLUTION INTRAMUSCULAR; INTRAVENOUS at 07:47

## 2018-09-21 RX ADMIN — SUCCINYLCHOLINE CHLORIDE 120 MG: 20 INJECTION INTRAMUSCULAR; INTRAVENOUS at 07:47

## 2018-09-21 RX ADMIN — FENTANYL CITRATE 25 MCG: 50 INJECTION, SOLUTION INTRAMUSCULAR; INTRAVENOUS at 09:52

## 2018-09-21 RX ADMIN — ONDANSETRON 4 MG: 2 INJECTION INTRAMUSCULAR; INTRAVENOUS at 09:14

## 2018-09-21 RX ADMIN — FENTANYL CITRATE 25 MCG: 50 INJECTION, SOLUTION INTRAMUSCULAR; INTRAVENOUS at 11:20

## 2018-09-21 RX ADMIN — ROCURONIUM BROMIDE 10 MG: 10 INJECTION, SOLUTION INTRAVENOUS at 07:47

## 2018-09-21 RX ADMIN — FENTANYL CITRATE 50 MCG: 50 INJECTION, SOLUTION INTRAMUSCULAR; INTRAVENOUS at 08:09

## 2018-09-21 RX ADMIN — Medication 80 MCG: at 08:31

## 2018-09-21 RX ADMIN — FENTANYL CITRATE 50 MCG: 50 INJECTION, SOLUTION INTRAMUSCULAR; INTRAVENOUS at 09:22

## 2018-09-21 RX ADMIN — FENTANYL CITRATE 25 MCG: 50 INJECTION, SOLUTION INTRAMUSCULAR; INTRAVENOUS at 10:05

## 2018-09-21 RX ADMIN — PROPOFOL 160 MG: 10 INJECTION, EMULSION INTRAVENOUS at 07:47

## 2018-09-21 RX ADMIN — PROPOFOL 50 MG: 10 INJECTION, EMULSION INTRAVENOUS at 09:29

## 2018-09-21 NOTE — IP AVS SNAPSHOT
Summary of Care Report The Summary of Care report has been created to help improve care coordination. Users with access to Recommend or [facility] (Web-based application) may access additional patient information including the Discharge Summary. If you are not currently a [address] user and need more information, please call the number listed below in the [place] [address] section and ask to be connected with Medical Records. Facility Information Name Address Phone [address] [address] [phone] Patient Information Patient Name Sex MIKE Oseguera ([id number]) Male 1985 Discharge Information Admitting Provider Service Area Unit Mariella Wagner MD / [phone] [facility] Osvaldo Lara / [phone] Discharge Provider Discharge Date/Time Discharge Disposition Destination (none) 2018 (Pending) AHR (none) Patient Language Language ENGLISH [13] Hospital Problems as of 2018  Reviewed: 2018  5:37 AM by Martin Lowry MD  
 None Non-Hospital Problems as of 2018  Reviewed: 2018  5:37 AM by Martin Lowry MD  
  
  
  
 Class Noted - Resolved Last Modified Active Problems Hydroureteronephrosis  2013 - Present 2015 by Valeria Robert Entered by Chelita Mendoza MD  
  ESRD on dialysis [facility])  2013 - Present 2015 by Valeria Robert Entered by Chelita Mendoza MD  
  ESRF (end stage renal failure) ([facility] [address]  2015 - Present 2015 by Yang Wong MD  
  Entered by Mick Celaya MD  
  Overview Signed 2015 12:08 PM by Mick Celaya MD  
   Has been on and off dialysis. Not on it at the time of admission due to non-compliance   Vesicoureteral reflux with nephropathy (Chronic)  2015 - Present 8/18/2015 by Annabel Edmond Entered by Sahil Luu MD  
  Crohn disease [facility]) Chronic 8/11/2015 - Present 8/11/2015 by Damaris Bonilla MD  
  Entered by Damaris Bonilla MD  
  Acute on chronic renal failure [facility]) Present on Admission 8/11/2015 - Present 8/11/2015 by Damaris Bonilla MD  
  Entered by Damaris Bonilla MD  
  Acute internal jugular vein thrombosis ([facility] [address]  8/18/2015 - Present 8/18/2015 by Annabel Edmond Entered by Annabel Edmond Thrombosis of arteriovenous dialysis fistula ([address]  10/23/2015 - Present 10/23/2015 by Hope Garcia MD  
  Entered by Hope Garcia MD  
  
You are allergic to the following Allergen Reactions Codeine Nausea and Vomiting Iodinated Contrast- Oral And Iv Dye Itching Shellfish Derived Hives And shrimp Current Discharge Medication List  
  
START taking these medications Dose & Instructions Dispensing Information Comments HYDROmorphone 2 mg tablet Commonly known as:  DILAUDID Dose:  2 mg Take 1 Tab by mouth every four (4) hours as needed for Pain. Max Daily Amount: 12 mg. Quantity:  20 Tab Refills:  0 Current Immunizations Name Date Pneumococcal Polysaccharide (PPSV-23) 9/1/2013 Surgery Information ID Date/Time Status Primary Surgeon All Procedures Location [id number] 9/21/2018 0730 Unposted Emerson Durán MD CREATION OF RIGHT ARM ARTERIO VENOUS FISTULA  WITH GRAFT  [facility] MAIN OR Follow-up Information Follow up With Details Comments Contact Info Lui Ferrara MD   Patient can only remember the practice name and not the physician Discharge Instructions DISCHARGE SUMMARY from Nurse PATIENT INSTRUCTIONS: 
 
After general anesthesia or intravenous sedation, for 24 hours or while taking prescription Narcotics: · Limit your activities · Do not drive and operate hazardous machinery · Do not make important personal or business decisions · Do  not drink alcoholic beverages · If you have not urinated within 8 hours after discharge, please contact your surgeon on call. Report the following to your surgeon: 
· Excessive pain, swelling, redness or odor of or around the surgical area · Temperature over 100.5 · Nausea and vomiting lasting longer than 4 hours or if unable to take medications · Any signs of decreased circulation or nerve impairment to extremity: change in color, persistent  numbness, tingling, coldness or increase pain · Any questions What to do at Home: A common side effect of anesthesia following surgery is nausea and/or vomiting. In order to decrease symptoms, it is wise to avoid foods that are high in fat, greasy foods, milk products, and spicy foods for the first 24 hours. Acceptable foods for the first 24 hours following surgery include but are not limited to: 
 
? soup 
? broth 
?  toast  
? crackers ? applesauce 
? bananas  
? mashed potatoes, 
? soft or scrambled eggs 
? oatmeal 
?  jello It is important to eat when taking your pain medication. This will help to prevent nausea. If possible, please try to time your meals with your medications. It is very important to stay hydrated following surgery. Sip fluids frequently while awake. Avoid acidic drinks such as citrus juices and soda for 24 hours. Carbonated beverages may cause bloating and gas. Acceptable fluids include: 
 
? water (flavor packets may add variety) ? coffee or tea (in moderation) ? Gatorade ? Corinne Motto ? apple juice 
? cranberry juice You are encouraged to cough and deep breathe every hour when awake. This will help to prevent respiratory complications following anesthesia. You may want to hug a pillow when coughing and sneezing to add additional support to the surgical area and to decrease discomfort if you had abdominal or chest surgery.  
 
If you are discharged home with support stockings, you may remove them after 24 hours. Support stockings are used to help prevent blood clots in the legs following surgery. TO PREVENT AN INFECTION [address]. [address] YOUR HANDS 
 
? To prevent infection, good handwashing is the most important thing you or your caregiver can do.   
 
? Wash your hands with soap and water or use the hand  we gave you before you touch any wounds. 2. SHOWER ? Use the antibacterial soap we gave you when you take a shower. ? Shower with this soap until your wounds are healed. ? To reach all areas of your body, you may need someone to help you. ? Dont forget to clean your belly button with every shower. 3.  USE CLEAN SHEETS 
 
? Use freshly cleaned sheets on your bed after surgery. ? To keep the surgery site clean, do not allow pets to sleep with you while your wound is still healing. 4. STOP SMOKING ? Stop smoking, or at least cut back on smoking ? Smoking slows your healing. 5.  CONTROL YOUR BLOOD SUGAR 
 
? High blood sugars slow wound healing. If you are diabetic, control your blood sugar levels before and after your surgery. Narcotic-Analgesic/Acetaminophen (Percocet, Norco, Lorcet HD, Lortab 10/325) - (By mouth) Why this medicine is used:  
Relieves pain. Contact a nurse or doctor right away if you have: 
Extreme weakness, shallow breathing, slow heartbeat Severe confusion, lightheadedness, dizziness, fainting Yellow skin or eyes, dark urine or pale stools Severe constipation, severe stomach pain, nausea, vomiting, loss of appetite Sweating or cold, clammy skin Common side effects: 
Mild constipation, nausea, vomiting Sleepiness, tiredness Itching, rash © 2017 [facility] Information is for End User's use only and may not be sold, redistributed or otherwise used for commercial purposes. ?   
Please take time to review all of your Home Care Instructions and Medication Information sheets provided in your discharge packet. If you have any questions, please contact your surgeons office. Thank you. Please carry medication information at all times in case of emergency situations. These are general instructions for a healthy lifestyle: No smoking/ No tobacco products/ Avoid exposure to second hand smoke Surgeon General's Warning:  Quitting smoking now greatly reduces serious risk to your health. Obesity, smoking, and sedentary lifestyle greatly increases your risk for illness A healthy diet, regular physical exercise & weight monitoring are important for maintaining a healthy lifestyle You may be retaining fluid if you have a history of heart failure or if you experience any of the following symptoms:  Weight gain of 3 pounds or more overnight or 5 pounds in a week, increased swelling in our hands or feet or shortness of breath while lying flat in bed. Please call your doctor as soon as you notice any of these symptoms; do not wait until your next office visit. Recognize signs and symptoms of STROKE: 
 
F-face looks uneven A-arms unable to move or move unevenly S-speech slurred or non-existent T-time-call 911 as soon as signs and symptoms begin-DO NOT go Back to bed or wait to see if you get better-TIME IS BRAIN. Warning Signs of HEART ATTACK Call 911 if you have these symptoms: 
? Chest discomfort. Most heart attacks involve discomfort in the center of the chest that lasts more than a few minutes, or that goes away and comes back. It can feel like uncomfortable pressure, squeezing, fullness, or pain. ? Discomfort in other areas of the upper body. Symptoms can include pain or discomfort in one or both arms, the back, neck, jaw, or stomach. ? Shortness of breath with or without chest discomfort. ? Other signs may include breaking out in a cold sweat, nausea, or lightheadedness. Don't wait more than five minutes to call [address]! Fast action can save your life. Calling 911 is almost always the fastest way to get lifesaving treatment. Emergency Medical Services staff can begin treatment when they arrive  up to an hour sooner than if someone gets to the hospital by car. The discharge information has been reviewed with the {PATIENT PARENT GUARDIAN:06185}. The {PATIENT PARENT GUARDIAN:49005} verbalized understanding. Discharge medications reviewed with the {Dishcarge meds reviewed [facility]:84964} and appropriate educational materials and side effects teaching were provided. ___________________________________________________________________________________________________________________________________ Patient Discharge Instructions Joanna Kasper / [id number] : 1985 Admitted 2018 Discharged: 2018 Take Home Medications · It is important that you take the medication exactly as they are prescribed. · Keep your medication in the bottles provided by the pharmacist and keep a list of the medication names, dosages, and times to be taken in your wallet. · Do not take other medications without consulting your doctor. What to do at [facility] Wound Care: Remove current bandage in 2 days then apply dry dressing to inner upper arm incision daily Recommended diet: Renal 
 
Recommended activity: As Tolerated. No Strenuous activity or heavy lifting with right arm If you experience any of the following symptoms severe right hand pain, numbness, weakness, or discoloration, please follow up with Dr Moody Beltran immediately. Follow-up with Dr Moody Beltran in 2-3 weeks at the [facility] Vascular center [phone] Information obtained by : 
I understand that if any problems occur once I am at home I am to contact my physician. I understand and acknowledge receipt of the instructions indicated above. Physician's or R.N.'s Signature                                                                  Date/Time Patient or Representative Signature                                                          Date/Time Chart Review Routing History Recipient Method Report Sent By Jahaira Stephenson MD  
Fax: [phone] Phone: [phone] Fax Aguila Bassett MD NOTES AUTO ROUTING REPORT Junaid Hanson MD [[id number]] 7/30/2015  2:16 AM 07/30/2015 Jim Stephenson MD  
Fax: [phone] Phone: [phone] Fax Aguila Bassett MD NOTES AUTO ROUTING REPORT MD Rocio Georges 7/30/2015  3:58 PM 07/30/2015  
 bel Fax: [phone] Fax Notes/Transcriptions Sony Hernández [[id number]] 7/31/2015  8:59 AM 07/30/2015 Jim Stephenson MD  
Fax: [phone] Phone: [phone] Fax Aguila Bassett MD NOTES AUTO ROUTING REPORT Shawn Mancia -[phone] 8/10/2015  6:30 PM 08/10/2015 Jim Stephenson MD  
Fax: [phone] Phone: [phone] Fax Aguila Bassett MD NOTES AUTO ROUTING REPORT Leah Kern MD [[id number]] 8/11/2015  2:32 PM 08/11/2015 Jim Stephenson MD  
Fax: [phone] Phone: [phone] Fax [facility] DREW BETTENCOURT NOTES AUTO ROUTING REPORT Gelacio Bonilla [[id number]] 8/18/2015  2:38 AM 08/18/2015 Jim Stephenson MD  
Fax: [phone] Phone: [phone] Fax Aguila Bassett MD NOTES AUTO ROUTING REPORT Amirah Hoang [[id number]] 8/19/2015 12:07 PM 08/19/2015 Michael Benedict MD  
Phone: [phone] In Basket IP Auto Routed Trans Tarun Alvarez MD [[id number]] 8/20/2015  9:33 PM 08/20/2015

## 2018-09-21 NOTE — DISCHARGE INSTRUCTIONS
DISCHARGE SUMMARY from Nurse    PATIENT INSTRUCTIONS:    After general anesthesia or intravenous sedation, for 24 hours or while taking prescription Narcotics:  · Limit your activities  · Do not drive and operate hazardous machinery  · Do not make important personal or business decisions  · Do  not drink alcoholic beverages  · If you have not urinated within 8 hours after discharge, please contact your surgeon on call. Report the following to your surgeon:  · Excessive pain, swelling, redness or odor of or around the surgical area  · Temperature over 100.5  · Nausea and vomiting lasting longer than 4 hours or if unable to take medications  · Any signs of decreased circulation or nerve impairment to extremity: change in color, persistent  numbness, tingling, coldness or increase pain  · Any questions    What to do at Home:  A common side effect of anesthesia following surgery is nausea and/or vomiting. In order to decrease symptoms, it is wise to avoid foods that are high in fat, greasy foods, milk products, and spicy foods for the first 24 hours. Acceptable foods for the first 24 hours following surgery include but are not limited to:     soup   broth    toast    crackers    applesauce    bananas    mashed potatoes,   soft or scrambled eggs   oatmeal    jello    It is important to eat when taking your pain medication. This will help to prevent nausea. If possible, please try to time your meals with your medications. It is very important to stay hydrated following surgery. Sip fluids frequently while awake. Avoid acidic drinks such as citrus juices and soda for 24 hours. Carbonated beverages may cause bloating and gas. Acceptable fluids include:    - water (flavor packets may add variety)  - coffee or tea (in moderation)  - Gatorade  - Juan-aid  - apple juice  - cranberry juice    You are encouraged to cough and deep breathe every hour when awake.  This will help to prevent respiratory complications following anesthesia. You may want to hug a pillow when coughing and sneezing to add additional support to the surgical area and to decrease discomfort if you had abdominal or chest surgery. If you are discharged home with support stockings, you may remove them after 24 hours. Support stockings are used to help prevent blood clots in the legs following surgery. TO PREVENT AN INFECTION      1. [address] YOUR HANDS     To prevent infection, good handwashing is the most important thing you or your caregiver can do.  Wash your hands with soap and water or use the hand  we gave you before you touch any wounds. 2. SHOWER     Use the antibacterial soap we gave you when you take a shower.  Shower with this soap until your wounds are healed.  To reach all areas of your body, you may need someone to help you.  Dont forget to clean your belly button with every shower. 3.  USE CLEAN SHEETS     Use freshly cleaned sheets on your bed after surgery.  To keep the surgery site clean, do not allow pets to sleep with you while your wound is still healing. 4. STOP SMOKING     Stop smoking, or at least cut back on smoking     Smoking slows your healing. 5.  CONTROL YOUR BLOOD SUGAR     High blood sugars slow wound healing. If you are diabetic, control your blood sugar levels before and after your surgery. Narcotic-Analgesic/Acetaminophen (Percocet, Norco, Lorcet HD, Lortab 10/325) - (By mouth)   Why this medicine is used:   Relieves pain.   Contact a nurse or doctor right away if you have:  Extreme weakness, shallow breathing, slow heartbeat  Severe confusion, lightheadedness, dizziness, fainting  Yellow skin or eyes, dark urine or pale stools  Severe constipation, severe stomach pain, nausea, vomiting, loss of appetite  Sweating or cold, clammy skin     Common side effects:  Mild constipation, nausea, vomiting  Sleepiness, tiredness  Itching, rash  © 2017 [facility] [address] is for End User's use only and may not be sold, redistributed or otherwise used for commercial purposes.    Please take time to review all of your Home Care Instructions and Medication Information sheets provided in your discharge packet. If you have any questions, please contact your surgeons office. Thank you. Please carry medication information at all times in case of emergency situations. These are general instructions for a healthy lifestyle:    No smoking/ No tobacco products/ Avoid exposure to second hand smoke  Surgeon General's Warning:  Quitting smoking now greatly reduces serious risk to your health. Obesity, smoking, and sedentary lifestyle greatly increases your risk for illness    A healthy diet, regular physical exercise & weight monitoring are important for maintaining a healthy lifestyle    You may be retaining fluid if you have a history of heart failure or if you experience any of the following symptoms:  Weight gain of 3 pounds or more overnight or 5 pounds in a week, increased swelling in our hands or feet or shortness of breath while lying flat in bed. Please call your doctor as soon as you notice any of these symptoms; do not wait until your next office visit. Recognize signs and symptoms of STROKE:    F-face looks uneven    A-arms unable to move or move unevenly    S-speech slurred or non-existent    T-time-call 911 as soon as signs and symptoms begin-DO NOT go       Back to bed or wait to see if you get better-TIME IS BRAIN. Warning Signs of HEART ATTACK     Call 911 if you have these symptoms:   Chest discomfort. Most heart attacks involve discomfort in the center of the chest that lasts more than a few minutes, or that goes away and comes back. It can feel like uncomfortable pressure, squeezing, fullness, or pain.  Discomfort in other areas of the upper body.  Symptoms can include pain or discomfort in one or both arms, the back, neck, jaw, or stomach.  Shortness of breath with or without chest discomfort.  Other signs may include breaking out in a cold sweat, nausea, or lightheadedness. Don't wait more than five minutes to call 911 - MINUTES MATTER! Fast action can save your life. Calling 911 is almost always the fastest way to get lifesaving treatment. Emergency Medical Services staff can begin treatment when they arrive -- up to an hour sooner than if someone gets to the hospital by car. The discharge information has been reviewed with the patient and spouse. The patient and spouse verbalized understanding. Discharge medications reviewed with the patient and spouse and appropriate educational materials and side effects teaching were provided. ___________________________________________________________________________________________________________________________________  Patient Discharge Instructions    Andrey Garner / [id number] : 1985    Admitted 2018 Discharged: 2018     Take Home Medications       · It is important that you take the medication exactly as they are prescribed. · Keep your medication in the bottles provided by the pharmacist and keep a list of the medication names, dosages, and times to be taken in your wallet. · Do not take other medications without consulting your doctor. What to do at [address] [place] [address] [place]: Remove current bandage in 2 days then apply dry dressing to inner upper arm incision daily    Recommended diet: Renal    Recommended activity: As Tolerated. No Strenuous activity or heavy lifting with right arm    If you experience any of the following symptoms severe right hand pain, numbness, weakness, or discoloration, please follow up with Dr Kaylin Roberto immediately. Follow-up with Dr Kaylin Roberto in 2-3 weeks at the [facility] Vascular center [phone]        Information obtained by :  I understand that if any problems occur once I am at home I am to contact my physician.     I understand and acknowledge receipt of the instructions indicated above.                                                                                                                                            Physician's or R.N.'s Signature                                                                  Date/Time                                                                                                                                              Patient or Representative Signature                                                          Date/Time

## 2018-09-21 NOTE — PERIOP NOTES
5339 - called Dr Bo Castañeda per request from Dr Shalini Biswas with anesthesia due to pt is scheduled for regional block, pt is stating he thought he was going to get to talk to the doctor and decide defintely on which arm and since he is right handed he would like it on the upper arm. Anesthesia would like to block but wanted to see if Dr Bo Castañeda could get here earlier so they have time to block. Dr Bo Castañeda stated he could not get here earlier and that he decides once he is in the OR which vessel to use and has to place where is the best spot and to let anesthesia know that more than likely this patient will be going to sleep. Notified Dr Shalini Biswas, who came in and talked to pt about general anesthesia. Notified pt that he will talk to Dr Bo Castañeda prior to going in for surgery but that they have to use whatever vessel is best.     3388 - explained to pt about vessel decision per what Dr Bo Castañeda had notified me of, ask patient if he wanted to wait to sign consent until Dr Bo Castañeda came in, pt stated \"no, I'll go ahead and sign it now\" .  Pt signed consent and I signed as witness to signature

## 2018-09-21 NOTE — PERIOP NOTES
Handoff Report from Operating Room to PACU    Report received from MELINDA Garza RN and KANDIS Cintron,Tim[address] regarding Wilhemena Collet. Surgeon(s): Erick Sanchez MD  And Procedure(s) (LRB):  CREATION OF RIGHT ARM ARTERIO VENOUS FISTULA  WITH GRAFT  (Right)  confirmed   with allergies and dressings discussed. Anesthesia type, drugs, patient history, complications, estimated blood loss, vital signs, intake and output, and last pain medication, lines, reversal medications and temperature were reviewed.

## 2018-09-21 NOTE — ANESTHESIA POSTPROCEDURE EVALUATION
Post-Anesthesia Evaluation and Assessment Patient: Nancy Croft MRN: [id number]  SSN: [id number] YOB: 1985  Age: 29 y.o. Sex: male Cardiovascular Function/Vital Signs Visit Vitals  /68 (BP 1 Location: Left arm, BP Patient Position: At rest)  Pulse 70  Temp 36.9 °C (98.5 °F)  Resp 25  
 Ht 5' 9\" (1.753 m)  Wt 72 kg (158 lb 11.7 oz)  SpO2 100%  BMI 23.44 kg/m2 Patient is status post general anesthesia for Procedure(s): CREATION OF RIGHT ARM ARTERIO VENOUS FISTULA  WITH GRAFT . Nausea/Vomiting: None Postoperative hydration reviewed and adequate. Pain: 
Pain Scale 1: Numeric (0 - 10) (09/21/18 1015) Pain Intensity 1: 9 (09/21/18 1015) Managed Neurological Status:  
Neuro (WDL): Exceptions to WDL (09/21/18 5519) Neuro Neurologic State: Alert (09/21/18 4903) Orientation Level: Appropriate for age;Oriented X4 (09/21/18 2050) Cognition: Appropriate decision making (09/21/18 0427) Speech: Appropriate for age (09/21/18 2120) LUE Motor Response: Purposeful;Tingling (09/21/18 0656) LLE Motor Response: Purposeful (09/21/18 0656) RUE Motor Response: Purposeful;Tingling (09/21/18 0656) RLE Motor Response: Purposeful (09/21/18 0656) At baseline Mental Status and Level of Consciousness: Arousable Pulmonary Status:  
O2 Device: Room air (09/21/18 1000) Adequate oxygenation and airway patent Complications related to anesthesia: None Post-anesthesia assessment completed. No concerns Signed By: Liliam Arroyo MD   
 September 21, 2018

## 2018-09-21 NOTE — IP AVS SNAPSHOT
[address] [facility] 
[phone] Patient: Sonu Aleman MRN: [id number] RIM:1/4/0213 About your hospitalization You were admitted on:  September 21, 2018 You last received care in the:  [facility] PACU You were discharged on:  September 21, 2018 Why you were hospitalized Your primary diagnosis was:  Not on File Follow-up Information Follow up With Details Comments Contact Info Phys Other, MD   Patient can only remember the practice name and not the physician Discharge Orders None A check kim indicates which time of day the medication should be taken. My Medications START taking these medications Instructions Each Dose to Equal  
 Morning Noon Evening Bedtime HYDROmorphone 2 mg tablet Commonly known as:  DILAUDID Your last dose was: Your next dose is: Take 1 Tab by mouth every four (4) hours as needed for Pain. Max Daily Amount: 12 mg.  
 2 mg Where to Get Your Medications Information on where to get these meds will be given to you by the nurse or doctor. ! Ask your nurse or doctor about these medications HYDROmorphone 2 mg tablet Opioid Education Prescription Opioids: What You Need to Know: 
 
 
After general anesthesia or intravenous sedation, for 24 hours or while taking prescription Narcotics: · Limit your activities · Do not drive and operate hazardous machinery · Do not make important personal or business decisions · Do  not drink alcoholic beverages · If you have not urinated within 8 hours after discharge, please contact your surgeon on call. Report the following to your surgeon: 
· Excessive pain, swelling, redness or odor of or around the surgical area · Temperature over 100.5 · Nausea and vomiting lasting longer than 4 hours or if unable to take medications · Any signs of decreased circulation or nerve impairment to extremity: change in color, persistent  numbness, tingling, coldness or increase pain · Any questions What to do at Home: A common side effect of anesthesia following surgery is nausea and/or vomiting. In order to decrease symptoms, it is wise to avoid foods that are high in fat, greasy foods, milk products, and spicy foods for the first 24 hours. Acceptable foods for the first 24 hours following surgery include but are not limited to: 
 
? soup 
? broth 
?  toast  
? crackers ? applesauce 
? bananas  
? mashed potatoes, 
? soft or scrambled eggs 
? oatmeal 
?  jello It is important to eat when taking your pain medication. This will help to prevent nausea. If possible, please try to time your meals with your medications. It is very important to stay hydrated following surgery. Sip fluids frequently while awake. Avoid acidic drinks such as citrus juices and soda for 24 hours. Carbonated beverages may cause bloating and gas. Acceptable fluids include: 
 
? water (flavor packets may add variety) ? coffee or tea (in moderation) ? Gatorade ? Olive Cables ? apple juice 
? cranberry juice You are encouraged to cough and deep breathe every hour when awake. This will help to prevent respiratory complications following anesthesia. You may want to hug a pillow when coughing and sneezing to add additional support to the surgical area and to decrease discomfort if you had abdominal or chest surgery. If you are discharged home with support stockings, you may remove them after 24 hours. Support stockings are used to help prevent blood clots in the legs following surgery. TO PREVENT AN INFECTION 1. [address] YOUR HANDS 
 
? To prevent infection, good handwashing is the most important thing you or your caregiver can do.   
 
? Wash your hands with soap and water or use the hand  we gave you before you touch any wounds. 2. SHOWER ? Use the antibacterial soap we gave you when you take a shower. ? Shower with this soap until your wounds are healed. ? To reach all areas of your body, you may need someone to help you. ? Dont forget to clean your belly button with every shower. 3.  USE CLEAN SHEETS 
 
? Use freshly cleaned sheets on your bed after surgery. ? To keep the surgery site clean, do not allow pets to sleep with you while your wound is still healing. 4. STOP SMOKING ? Stop smoking, or at least cut back on smoking ? Smoking slows your healing. 5.  CONTROL YOUR BLOOD SUGAR 
 
? High blood sugars slow wound healing. If you are diabetic, control your blood sugar levels before and after your surgery. Narcotic-Analgesic/Acetaminophen (Percocet, Norco, Lorcet HD, Lortab 10/325) - (By mouth) Why this medicine is used:  
Relieves pain. Contact a nurse or doctor right away if you have: 
Extreme weakness, shallow breathing, slow heartbeat Severe confusion, lightheadedness, dizziness, fainting Yellow skin or eyes, dark urine or pale stools Severe constipation, severe stomach pain, nausea, vomiting, loss of appetite Sweating or cold, clammy skin Common side effects: 
Mild constipation, nausea, vomiting Sleepiness, tiredness Itching, rash © 2017 [facility] Information is for End User's use only and may not be sold, redistributed or otherwise used for commercial purposes.  
?  
 Please take time to review all of your Home Care Instructions and Medication Information sheets provided in your discharge packet. If you have any questions, please contact your surgeons office. Thank you. Please carry medication information at all times in case of emergency situations. These are general instructions for a healthy lifestyle: No smoking/ No tobacco products/ Avoid exposure to second hand smoke Surgeon General's Warning:  Quitting smoking now greatly reduces serious risk to your health. Obesity, smoking, and sedentary lifestyle greatly increases your risk for illness A healthy diet, regular physical exercise & weight monitoring are important for maintaining a healthy lifestyle You may be retaining fluid if you have a history of heart failure or if you experience any of the following symptoms:  Weight gain of 3 pounds or more overnight or 5 pounds in a week, increased swelling in our hands or feet or shortness of breath while lying flat in bed. Please call your doctor as soon as you notice any of these symptoms; do not wait until your next office visit. Recognize signs and symptoms of STROKE: 
 
F-face looks uneven A-arms unable to move or move unevenly S-speech slurred or non-existent T-time-call 911 as soon as signs and symptoms begin-DO NOT go Back to bed or wait to see if you get better-TIME IS BRAIN. Warning Signs of HEART ATTACK Call 911 if you have these symptoms: 
? Chest discomfort. Most heart attacks involve discomfort in the center of the chest that lasts more than a few minutes, or that goes away and comes back. It can feel like uncomfortable pressure, squeezing, fullness, or pain. ? Discomfort in other areas of the upper body. Symptoms can include pain or discomfort in one or both arms, the back, neck, jaw, or stomach. ? Shortness of breath with or without chest discomfort.  
? Other signs may include breaking out in a cold sweat, nausea, or lightheadedness. Don't wait more than five minutes to call [address]! Fast action can save your life. Calling 911 is almost always the fastest way to get lifesaving treatment. Emergency Medical Services staff can begin treatment when they arrive  up to an hour sooner than if someone gets to the hospital by car. The discharge information has been reviewed with the {PATIENT PARENT GUARDIAN:97198}. The {PATIENT PARENT GUARDIAN:20094} verbalized understanding. Discharge medications reviewed with the {Dishcarge meds reviewed [facility]:25869} and appropriate educational materials and side effects teaching were provided. ___________________________________________________________________________________________________________________________________ Patient Discharge Instructions Gwynneth Jeong / [id number] : 1985 Admitted 2018 Discharged: 2018 Take Home Medications · It is important that you take the medication exactly as they are prescribed. · Keep your medication in the bottles provided by the pharmacist and keep a list of the medication names, dosages, and times to be taken in your wallet. · Do not take other medications without consulting your doctor. What to do at [facility] Wound Care: Remove current bandage in 2 days then apply dry dressing to inner upper arm incision daily Recommended diet: Renal 
 
Recommended activity: As Tolerated. No Strenuous activity or heavy lifting with right arm If you experience any of the following symptoms severe right hand pain, numbness, weakness, or discoloration, please follow up with Dr Remington Grant immediately. Follow-up with Dr Remington Grant in 2-3 weeks at the [facility] Vascular center [phone] Information obtained by : 
I understand that if any problems occur once I am at home I am to contact my physician. I understand and acknowledge receipt of the instructions indicated above. Physician's or R.N.'s Signature                                                                  Date/Time Patient or Representative Signature                                                          Date/Time Introducing [facility] & HEALTH SERVICES! [facility] introduces [employer] CRM patient portal. Now you can access parts of your medical record, email your doctor's office, and request medication refills online. 1. In your internet browser, go to [email or web address] 2. Click on the First Time User? Click Here link in the Sign In box. You will see the New Member Sign Up page. 3. Enter your [employer] CRM Access Code exactly as it appears below. You will not need to use this code after youve completed the sign-up process. If you do not sign up before the expiration date, you must request a new code. · [employer] CRM Access Code: [email or web address] Expires: 12/20/2018 11:31 AM 
 
4. Enter the last four digits of your Social Security Number (xxxx) and Date of Birth (mm/dd/yyyy) as indicated and click Submit. You will be taken to the next sign-up page. 5. Create a Pipeliner CRM ID. This will be your Pipeliner CRM login ID and cannot be changed, so think of one that is secure and easy to remember. 6. Create a [employer] CRM password. You can change your password at any time. 7. Enter your Password Reset Question and Answer. This can be used at a later time if you forget your password. 8. Enter your e-mail address. You will receive e-mail notification when new information is available in [address] 9. Click Sign Up. You can now view and download portions of your medical record.  
10. Click the Download Summary menu link to download a portable copy of your medical information. If you have questions, please visit the Frequently Asked Questions section of the [employer] website. Remember, [employer] is NOT to be used for urgent needs. For medical emergencies, dial 911. Now available from your iPhone and Android! Introducing Rigo Cooney As a [facility] patient, I wanted to make you aware of our electronic visit tool called Rigo Cooney. [facility] 24/7 allows you to connect within minutes with a medical provider 24 hours a day, seven days a week via a mobile device or tablet or logging into a secure website from your computer. You can access Rigo Cooney from anywhere in the [place]. A virtual visit might be right for you when you have a simple condition and feel like you just dont want to get out of bed, or cant get away from work for an appointment, when your regular [facility] provider is not available (evenings, weekends or holidays), or when youre out of town and need minor care. Electronic visits cost only $49 and if the [facility] 24/7 provider determines a prescription is needed to treat your condition, one can be electronically transmitted to a nearby pharmacy*. Please take a moment to enroll today if you have not already done so. The enrollment process is free and takes just a few minutes. To enroll, please download the KBLE 24/7 ivan to your tablet or phone, or visit [email or web address] to enroll on your computer. And, as an [address] [place] [address] patient with a powervault account, the results of your visits will be scanned into your electronic medical record and your primary care provider will be able to view the scanned results. We urge you to continue to see your regular [facility] provider for your ongoing medical care.   And while your primary care provider may not be the one available when you seek a Rigo Cooney virtual visit, the peace of mind you get from getting a real diagnosis real time can be priceless. For more information on Rigo Cooney, view our Frequently Asked Questions (FAQs) at [email or web address]. Sincerely, 
 
Amy Weaver MD 
Chief Medical Officer [address] *:  certain medications cannot be prescribed via Rigo Cooney Providers Seen During Your Hospitalization Provider Specialty Primary office phone Olive Peng MD Vascular Surgery [phone] Your Primary Care Physician (PCP) Primary Care Physician Office Phone Office Fax OTHER, PHYS ** None ** ** None ** You are allergic to the following Allergen Reactions Codeine Nausea and Vomiting Iodinated Contrast- Oral And Iv Dye Itching Shellfish Derived Hives And shrimp Recent Documentation Height Weight BMI Smoking Status 1.753 m 72 kg 23.44 kg/m2 Current Some Day Smoker Emergency Contacts Name Discharge Info Relation Home Work Mobile Jennifer Ochoa DISCHARGE CAREGIVER [3] Spouse [3]   [phone] Patient Belongings The following personal items are in your possession at time of discharge: 
  Dental Appliances: None  Visual Aid: None   Hearing Aids/Status: Does not own  Home Medications: None   Jewelry: None  Clothing: Other (comment) (street clothes and boots)    Other Valuables: None  Personal Items Sent to Safe: declined Please provide this summary of care documentation to your next provider. Signatures-by signing, you are acknowledging that this After Visit Summary has been reviewed with you and you have received a copy. Patient Signature:  ____________________________________________________________ Date:  ____________________________________________________________  
  
Ezra Corrales Provider Signature:  ____________________________________________________________ Date:  ____________________________________________________________

## 2018-09-21 NOTE — H&P
History and Physical    Subjective:     Sun Tierney is a 29 y.o. male who needs AV access for HD. Past Medical History:   Diagnosis Date    Acute renal failure (ARF) ([facility] [address] 7/30/2015    Chronic kidney disease     pt on hemodialysis d/t reflux    Congenital hydroureteronephrosis     Crohn disease ([facility] [facility]     DVT (deep venous thrombosis) (HCC)          ESRD (end stage renal disease) ([facility] [address]     on HD [id number]    Gastrointestinal disorder     Chron's    VUR (vesicoureteric reflux)       Past Surgical History:   Procedure Laterality Date    HX OTHER SURGICAL  Pt has a filter for DVT's    HX OTHER SURGICAL      Marquis placed 3 weeks ago    HX VASCULAR ACCESS  1/14/14    CREATION LEFT UPPER ARM ARTERIO VENOUS GRAFT   (7mm PTFE)     Family History   Problem Relation Age of Onset    Heart Disease Other     Kidney Disease Other      kidney stones      Social History   Substance Use Topics    Smoking status: Current Some Day Smoker    Smokeless tobacco: Never Used      Comment: quit about a year ago     Alcohol use No       Prior to Admission medications    Not on File     Allergies   Allergen Reactions    Codeine Nausea and Vomiting    Iodinated Contrast- Oral And Iv Dye Itching    Shellfish Derived Hives     And shrimp        Review of Systems:  Denies CP/SOB    Objective:     Physical Exam:   Visit Vitals    /74 (BP 1 Location: Left arm, BP Patient Position: At rest)  Comment (BP 1 Location): lower arm    Pulse 68    Temp 98.4 °F (36.9 °C)    Resp 18    Ht 5' 9\" (1.753 m)  Comment: per pt    Wt 72 kg (158 lb 11.7 oz)    SpO2 97%    BMI 23.44 kg/m2     General:  Alert, cooperative, no distress, appears stated age. Head:  Normocephalic, without obvious abnormality, atraumatic. Neck: Supple, symmetrical, trachea midline, no adenopathy, thyroid: no enlargement/tenderness/nodules, no carotid bruit and no JVD. Lungs:   Clear to auscultation bilaterally.        Heart: Regular rate and rhythm, S1, S2 normal, no murmur, click, rub or gallop. Abdomen:   Soft, non-tender. Bowel sounds normal. No masses,  No organomegaly. Extremities: Extremities normal, atraumatic, no cyanosis or edema. Failed LUE AV access   Pulses: 2+ and symmetric radials. Neurologic: Normal strength, sensation throughout.        Assessment:     ESRD    Plan:     RUE AVF/AVG    Signed By: Giovanny Louis MD     September 21, 2018

## 2018-09-21 NOTE — BRIEF OP NOTE
BRIEF OPERATIVE NOTE    Date of Procedure: 9/21/2018   Preoperative Diagnosis: END STAGE RENAL DISEASE  Postoperative Diagnosis: END STAGE RENAL DISEASE    Procedure(s):  CREATION OF RIGHT ARM ARTERIO VENOUS FISTULA  WITH GRAFT   Surgeon(s) and Role:     * Francine Benavidez MD - Primary         Surgical Assistant: None    Surgical Staff:  Circ-1: Akshat Richardson, RN  Scrub Tech-1: Milind Valdez  Scrub RN-1: Ben Waldrop  Surg Asst-1: Kelly Arriaga  Event Time In   Incision Start 0813   Incision Close 0925     Anesthesia: Other   Estimated Blood Loss: 25 cc  Specimens: * No specimens in log *   Findings: RUE loop graft   Complications: None  Implants:   Implant Name Type Inv.  Item Serial No.  Lot No. LRB No. Used Action   GRAFT TW STRTCH [id number] -- Kiko Hebrew   GRAFT TW STRTCH [id number] -- PROPATEN [id number] [employer] NA Right 1 Implanted

## 2018-09-21 NOTE — ANESTHESIA PREPROCEDURE EVALUATION
Anesthetic History No history of anesthetic complications Review of Systems / Medical History Patient summary reviewed, nursing notes reviewed and pertinent labs reviewed Pulmonary Smoker Neuro/Psych Within defined limits Cardiovascular Within defined limits Exercise tolerance: >4 METS Comments: H/O DVT  
GI/Hepatic/Renal 
  
 
 
Renal disease: ESRD and dialysis Comments: Congenital hydroureteronephrosis Crohn's Disease Endo/Other Within defined limits Other Findings Physical Exam 
 
Airway Mallampati: II 
TM Distance: 4 - 6 cm Neck ROM: normal range of motion Mouth opening: Normal 
 
 Cardiovascular Regular rate and rhythm,  S1 and S2 normal,  no murmur, click, rub, or gallop Dental 
No notable dental hx Pulmonary Breath sounds clear to auscultation Abdominal 
GI exam deferred Other Findings Anesthetic Plan ASA: 4 Anesthesia type: general 
 
 
 
 
Induction: Intravenous Anesthetic plan and risks discussed with: Patient

## 2018-10-08 NOTE — OP NOTES
[address]  OPERATIVE REPORT    Macarena Mccarty  MR#: [id number]  : 1985  ACCOUNT #: [de-identified]   DATE OF SERVICE: 2018    SURGEON:  NICOLASA Tucker MD      ASSISTANT:  None. PREOPERATIVE DIAGNOSIS:  End-stage renal disease. POSTOPERATIVE DIAGNOSIS:  End-stage renal disease. PROCEDURE PERFORMED:  Creation of right arm arteriovenous graft. ANESTHESIA:  Block. ESTIMATED BLOOD LOSS:  Less 25 mL. SPECIMENS REMOVED:  None. DRAINS:  None. IMPLANTS:  A 7 mm [place] Propaten graft. COMPLICATIONS:  None. INDICATIONS:  The patient is a 78-year-old male with end-stage renal disease who requires permanent dialysis access. Preoperative vein mapping suggests no adequate right arm vein for fistula creation. The patient presents for placement of a right upper extremity graft or fistula. PROCEDURE:  After informed consent was obtained, the patient was given preoperative intravenous antibiotics within 1 hour of the incision. He was taken to the operating room with a satisfactory right upper extremity block in place. The right arm was prepped and draped as a sterile field. Real time ultrasound was used to examine the superficial veins of the right arm and no suitable veins for fistula creation were identified. A decision was made to proceed with placement of a right upper arm graft. Through an incision on the medial proximal right upper arm, brachial artery and brachial vein were dissected free and were both good vessels. They were encircled with Vesseloops and the patient was systemically heparinized. An end-to-side anastomosis was created between a 7 mm [place] Propaten graft and the brachial artery using a running 5-0 [place]-Mal suture. Upon completion, the anastomosis was hemostatic and there was a good pulse in the graft. Three small counter incisions were made on the anterior surface of the upper arm to assist with tunneling.   The graft was tunneled in a subdermal plane in a loop configuration on the anterior surface of the upper arm oriented such that the arterial side is lateral.  Next an end-to-side anastomosis was created between the graft and the brachial vein using running 5-0 [place]-Mal suture. Upon completion, there was an excellent thrill in the circuit. Both anastomoses were hemostatic and there was a good pulse at the wrist.  All wounds were irrigated with antibiotic irrigation and closed with Vicryl suture and skin staples. Dry dressings were applied. All counts were correct. The patient was transferred to the PACU in stable condition having tolerated the procedure well.       MD DIALLO Marley /   D: 10/07/2018 23:32     T: 10/08/2018 05:12  JOB #: [id number]

## 2019-08-28 ENCOUNTER — APPOINTMENT (OUTPATIENT)
Dept: NUCLEAR MEDICINE | Age: 34
DRG: 690 | End: 2019-08-28
Attending: PHYSICIAN ASSISTANT
Payer: MEDICARE

## 2019-08-28 ENCOUNTER — APPOINTMENT (OUTPATIENT)
Dept: ULTRASOUND IMAGING | Age: 34
DRG: 690 | End: 2019-08-28
Attending: HOSPITALIST
Payer: MEDICARE

## 2019-08-28 ENCOUNTER — OFFICE VISIT (OUTPATIENT)
Dept: INTERNAL MEDICINE CLINIC | Age: 34
End: 2019-08-28

## 2019-08-28 ENCOUNTER — HOSPITAL ENCOUNTER (INPATIENT)
Age: 34
LOS: 9 days | Discharge: HOME | DRG: 690 | End: 2019-09-06
Attending: EMERGENCY MEDICINE | Admitting: HOSPITALIST
Payer: MEDICARE

## 2019-08-28 ENCOUNTER — APPOINTMENT (OUTPATIENT)
Dept: GENERAL RADIOLOGY | Age: 34
DRG: 690 | End: 2019-08-28
Attending: PHYSICIAN ASSISTANT
Payer: MEDICARE

## 2019-08-28 VITALS
RESPIRATION RATE: 16 BRPM | HEIGHT: 69 IN | HEART RATE: 62 BPM | BODY MASS INDEX: 23.03 KG/M2 | WEIGHT: 155.5 LBS | SYSTOLIC BLOOD PRESSURE: 136 MMHG | OXYGEN SATURATION: 100 % | TEMPERATURE: 96.9 F | DIASTOLIC BLOOD PRESSURE: 78 MMHG

## 2019-08-28 DIAGNOSIS — N39.0 FREQUENT UTI: ICD-10-CM

## 2019-08-28 DIAGNOSIS — E83.52 HYPERCALCEMIA: ICD-10-CM

## 2019-08-28 DIAGNOSIS — Z76.89 ENCOUNTER TO ESTABLISH CARE WITH NEW DOCTOR: ICD-10-CM

## 2019-08-28 DIAGNOSIS — R73.02 IMPAIRED GLUCOSE TOLERANCE: ICD-10-CM

## 2019-08-28 DIAGNOSIS — Z99.2 ESRD ON DIALYSIS (HCC): ICD-10-CM

## 2019-08-28 DIAGNOSIS — N13.70 VESICOURETERAL REFLUX: ICD-10-CM

## 2019-08-28 DIAGNOSIS — Z00.00 MEDICARE ANNUAL WELLNESS VISIT, INITIAL: ICD-10-CM

## 2019-08-28 DIAGNOSIS — R10.9 ACUTE LEFT FLANK PAIN: ICD-10-CM

## 2019-08-28 DIAGNOSIS — Z13.220 SCREENING FOR CHOLESTEROL LEVEL: ICD-10-CM

## 2019-08-28 DIAGNOSIS — Z00.00 ENCOUNTER FOR MEDICAL EXAMINATION TO ESTABLISH CARE: Primary | ICD-10-CM

## 2019-08-28 DIAGNOSIS — Z13.1 SCREENING FOR DIABETES MELLITUS: ICD-10-CM

## 2019-08-28 DIAGNOSIS — N18.6 ESRD (END STAGE RENAL DISEASE) (HCC): ICD-10-CM

## 2019-08-28 DIAGNOSIS — N18.6 ESRD ON DIALYSIS (HCC): ICD-10-CM

## 2019-08-28 DIAGNOSIS — K85.90 ACUTE PANCREATITIS, UNSPECIFIED COMPLICATION STATUS, UNSPECIFIED PANCREATITIS TYPE: Primary | ICD-10-CM

## 2019-08-28 DIAGNOSIS — K50.014 CROHN'S DISEASE OF SMALL INTESTINE WITH ABSCESS (HCC): ICD-10-CM

## 2019-08-28 PROBLEM — R74.8 ELEVATED LIPASE: Status: ACTIVE | Noted: 2019-08-28

## 2019-08-28 PROBLEM — N19 UREMIA: Status: ACTIVE | Noted: 2019-08-28

## 2019-08-28 LAB
ALBUMIN SERPL-MCNC: 3.2 G/DL (ref 3.5–5)
ALBUMIN UR QL STRIP: 0 MG/L
ALBUMIN/GLOB SERPL: 0.6 {RATIO} (ref 1.1–2.2)
ALP SERPL-CCNC: 100 U/L (ref 45–117)
ALT SERPL-CCNC: 14 U/L (ref 12–78)
ANION GAP SERPL CALC-SCNC: 10 MMOL/L (ref 5–15)
APPEARANCE UR: ABNORMAL
AST SERPL-CCNC: 13 U/L (ref 15–37)
ATRIAL RATE: 58 BPM
BACTERIA URNS QL MICRO: NEGATIVE /HPF
BASOPHILS # BLD: 0.1 K/UL (ref 0–0.1)
BASOPHILS NFR BLD: 1 % (ref 0–1)
BILIRUB SERPL-MCNC: 0.4 MG/DL (ref 0.2–1)
BILIRUB UR QL: NEGATIVE
BUN SERPL-MCNC: 92 MG/DL (ref 6–20)
BUN/CREAT SERPL: 8 (ref 12–20)
CALCIUM SERPL-MCNC: 6.4 MG/DL (ref 8.5–10.1)
CALCULATED P AXIS, ECG09: 64 DEGREES
CALCULATED R AXIS, ECG10: 1 DEGREES
CALCULATED T AXIS, ECG11: 43 DEGREES
CHLORIDE SERPL-SCNC: 112 MMOL/L (ref 97–108)
CHOLEST SERPL-MCNC: 118 MG/DL
CO2 SERPL-SCNC: 18 MMOL/L (ref 21–32)
COLOR UR: ABNORMAL
COMMENT, HOLDF: NORMAL
CREAT SERPL-MCNC: 11.1 MG/DL (ref 0.7–1.3)
CREATININE, URINE POC: 1 MG/DL
DIAGNOSIS, 93000: NORMAL
DIFFERENTIAL METHOD BLD: ABNORMAL
EOSINOPHIL # BLD: 0.2 K/UL (ref 0–0.4)
EOSINOPHIL NFR BLD: 2 % (ref 0–7)
EPITH CASTS URNS QL MICRO: ABNORMAL /LPF
ERYTHROCYTE [DISTWIDTH] IN BLOOD BY AUTOMATED COUNT: 14.7 % (ref 11.5–14.5)
GLOBULIN SER CALC-MCNC: 5.3 G/DL (ref 2–4)
GLUCOSE SERPL-MCNC: 108 MG/DL (ref 65–100)
GLUCOSE UR STRIP.AUTO-MCNC: NEGATIVE MG/DL
HBA1C MFR BLD HPLC: 5 %
HCT VFR BLD AUTO: 31.9 % (ref 36.6–50.3)
HDLC SERPL-MCNC: 49 MG/DL
HGB BLD-MCNC: 9.7 G/DL (ref 12.1–17)
HGB UR QL STRIP: ABNORMAL
IMM GRANULOCYTES # BLD AUTO: 0.1 K/UL (ref 0–0.04)
IMM GRANULOCYTES NFR BLD AUTO: 1 % (ref 0–0.5)
KETONES UR QL STRIP.AUTO: NEGATIVE MG/DL
LDL CHOLESTEROL POC: 60 MG/DL
LEUKOCYTE ESTERASE UR QL STRIP.AUTO: ABNORMAL
LIPASE SERPL-CCNC: 955 U/L (ref 73–393)
LYMPHOCYTES # BLD: 1.7 K/UL (ref 0.8–3.5)
LYMPHOCYTES NFR BLD: 18 % (ref 12–49)
MCH RBC QN AUTO: 28.8 PG (ref 26–34)
MCHC RBC AUTO-ENTMCNC: 30.4 G/DL (ref 30–36.5)
MCV RBC AUTO: 94.7 FL (ref 80–99)
MICROALBUMIN/CREAT RATIO POC: >300 MG/G
MONOCYTES # BLD: 0.6 K/UL (ref 0–1)
MONOCYTES NFR BLD: 6 % (ref 5–13)
NEUTS SEG # BLD: 6.6 K/UL (ref 1.8–8)
NEUTS SEG NFR BLD: 72 % (ref 32–75)
NITRITE UR QL STRIP.AUTO: NEGATIVE
NON-HDL GOAL (POC): 69
NRBC # BLD: 0 K/UL (ref 0–0.01)
NRBC BLD-RTO: 0 PER 100 WBC
P-R INTERVAL, ECG05: 140 MS
PH UR STRIP: 6 [PH] (ref 5–8)
PLATELET # BLD AUTO: 169 K/UL (ref 150–400)
PMV BLD AUTO: 9.5 FL (ref 8.9–12.9)
POTASSIUM SERPL-SCNC: 4 MMOL/L (ref 3.5–5.1)
PROT SERPL-MCNC: 8.5 G/DL (ref 6.4–8.2)
PROT UR STRIP-MCNC: 100 MG/DL
Q-T INTERVAL, ECG07: 462 MS
QRS DURATION, ECG06: 96 MS
QTC CALCULATION (BEZET), ECG08: 453 MS
RBC # BLD AUTO: 3.37 M/UL (ref 4.1–5.7)
RBC #/AREA URNS HPF: ABNORMAL /HPF (ref 0–5)
SAMPLES BEING HELD,HOLD: NORMAL
SODIUM SERPL-SCNC: 140 MMOL/L (ref 136–145)
SP GR UR REFRACTOMETRY: 1.01 (ref 1–1.03)
TCHOL/HDL RATIO (POC): 2.4
TRIGL SERPL-MCNC: 48 MG/DL
TROPONIN I SERPL-MCNC: <0.05 NG/ML
UR CULT HOLD, URHOLD: NORMAL
UROBILINOGEN UR QL STRIP.AUTO: 0.2 EU/DL (ref 0.2–1)
VENTRICULAR RATE, ECG03: 58 BPM
WBC # BLD AUTO: 9.1 K/UL (ref 4.1–11.1)
WBC URNS QL MICRO: >100 /HPF (ref 0–4)

## 2019-08-28 PROCEDURE — 85025 COMPLETE CBC W/AUTO DIFF WBC: CPT

## 2019-08-28 PROCEDURE — 65270000029 HC RM PRIVATE

## 2019-08-28 PROCEDURE — 71046 X-RAY EXAM CHEST 2 VIEWS: CPT

## 2019-08-28 PROCEDURE — 76770 US EXAM ABDO BACK WALL COMP: CPT

## 2019-08-28 PROCEDURE — 81001 URINALYSIS AUTO W/SCOPE: CPT

## 2019-08-28 PROCEDURE — 74011250637 HC RX REV CODE- 250/637: Performed by: HOSPITALIST

## 2019-08-28 PROCEDURE — A9558 XE133 XENON 10MCI: HCPCS

## 2019-08-28 PROCEDURE — 36415 COLL VENOUS BLD VENIPUNCTURE: CPT

## 2019-08-28 PROCEDURE — 74011250636 HC RX REV CODE- 250/636: Performed by: HOSPITALIST

## 2019-08-28 PROCEDURE — 99285 EMERGENCY DEPT VISIT HI MDM: CPT

## 2019-08-28 PROCEDURE — 84484 ASSAY OF TROPONIN QUANT: CPT

## 2019-08-28 PROCEDURE — 87086 URINE CULTURE/COLONY COUNT: CPT

## 2019-08-28 PROCEDURE — 83690 ASSAY OF LIPASE: CPT

## 2019-08-28 PROCEDURE — 74011000258 HC RX REV CODE- 258: Performed by: HOSPITALIST

## 2019-08-28 PROCEDURE — 80053 COMPREHEN METABOLIC PANEL: CPT

## 2019-08-28 PROCEDURE — 93005 ELECTROCARDIOGRAM TRACING: CPT

## 2019-08-28 RX ORDER — ONDANSETRON 2 MG/ML
4 INJECTION INTRAMUSCULAR; INTRAVENOUS
Status: DISCONTINUED | OUTPATIENT
Start: 2019-08-28 | End: 2019-09-06 | Stop reason: HOSPADM

## 2019-08-28 RX ORDER — HYDROCODONE BITARTRATE AND ACETAMINOPHEN 5; 325 MG/1; MG/1
1 TABLET ORAL
Status: DISCONTINUED | OUTPATIENT
Start: 2019-08-28 | End: 2019-09-06 | Stop reason: HOSPADM

## 2019-08-28 RX ORDER — SODIUM BICARBONATE 650 MG/1
1950 TABLET ORAL 3 TIMES DAILY
Refills: 0 | COMMUNITY
Start: 2019-07-21 | End: 2019-09-06

## 2019-08-28 RX ORDER — SODIUM CHLORIDE 9 MG/ML
75 INJECTION, SOLUTION INTRAVENOUS CONTINUOUS
Status: DISPENSED | OUTPATIENT
Start: 2019-08-28 | End: 2019-08-29

## 2019-08-28 RX ORDER — SODIUM CHLORIDE 0.9 % (FLUSH) 0.9 %
5-40 SYRINGE (ML) INJECTION AS NEEDED
Status: DISCONTINUED | OUTPATIENT
Start: 2019-08-28 | End: 2019-09-06 | Stop reason: HOSPADM

## 2019-08-28 RX ORDER — SODIUM CHLORIDE 0.9 % (FLUSH) 0.9 %
5-40 SYRINGE (ML) INJECTION EVERY 8 HOURS
Status: DISCONTINUED | OUTPATIENT
Start: 2019-08-28 | End: 2019-09-06 | Stop reason: HOSPADM

## 2019-08-28 RX ORDER — SODIUM BICARBONATE 650 MG/1
650 TABLET ORAL 3 TIMES DAILY
Status: DISCONTINUED | OUTPATIENT
Start: 2019-08-28 | End: 2019-08-29

## 2019-08-28 RX ORDER — ACETAMINOPHEN 325 MG/1
650 TABLET ORAL
Status: DISCONTINUED | OUTPATIENT
Start: 2019-08-28 | End: 2019-09-06 | Stop reason: HOSPADM

## 2019-08-28 RX ADMIN — CEFTRIAXONE SODIUM 1 G: 1 INJECTION, POWDER, FOR SOLUTION INTRAMUSCULAR; INTRAVENOUS at 22:44

## 2019-08-28 RX ADMIN — SODIUM CHLORIDE 75 ML/HR: 900 INJECTION, SOLUTION INTRAVENOUS at 22:44

## 2019-08-28 RX ADMIN — HYDROCODONE BITARTRATE AND ACETAMINOPHEN 1 TABLET: 5; 325 TABLET ORAL at 22:45

## 2019-08-28 RX ADMIN — SODIUM BICARBONATE 650 MG: 650 TABLET ORAL at 22:46

## 2019-08-28 RX ADMIN — Medication 10 ML: at 22:46

## 2019-08-28 NOTE — PROGRESS NOTES
Chief Complaint   Patient presents with   [facility] Establish Care     1. Have you been to the ER, urgent care clinic since your last visit? Hospitalized since your last visit? Yes Reason for visit: back/kidney pain    2. Have you seen or consulted any other health care providers outside of the [address] [place] [address] since your last visit? Include any pap smears or colon screening. Yes Reason for visit: ED   3 most recent PHQ Screens 8/28/2019   Little interest or pleasure in doing things Not at all   Feeling down, depressed, irritable, or hopeless Not at all   Total Score PHQ 2 0     Abuse Screening Questionnaire 8/28/2019   Do you ever feel afraid of your partner? N   Are you in a relationship with someone who physically or mentally threatens you? N   Is it safe for you to go home? Y     Fall Risk Assessment, last 12 mths 8/28/2019   Able to walk? Yes   Fall in past 12 months?  No

## 2019-08-28 NOTE — H&P
HISTORY AND PHYSICAL  Iva Espinosa MD        PCP: Other, MD Lui    Chief Complaint:left flank pain x2 days    History of present illness: This is a 36year old gentleman with significant PMH of ESRD who did not have dialysis in over a year came from PCP office for flank pain,aching type,6/10, non radiating associated with dysuria without nausea,vomiting,fever or chills. Although he has ESRD that had required dialysis ,he makes normal amount of urine per patient. He went to establish new PCP today from where he was referred to the ED. He was discharged from dialysis clinics due to non-compliance. Patient admits its his fault but says his decision was influenced more by fear when he goes on to the dialysis machine. He has been trying to establish care since unsuccessfully. In the ER UA was abnormal,lipase was elevated and there was concern about pancreatitis he has had elevated pancreatic enzymes in the past.He denies alcohol abuse. PMH/PSH:  Past Medical History:   Diagnosis Date    Acute renal failure (ARF) ([address] 7/30/2015    Chronic kidney disease     pt on hemodialysis d/t reflux    Congenital hydroureteronephrosis     Crohn disease ([address]     [facility]          ESRD (end stage renal disease) ([address]     on HD 4025-2517    Gastrointestinal disorder     Chron's    VUR (vesicoureteric reflux)      Past Surgical History:   Procedure Laterality Date    HX OTHER SURGICAL  Pt has a filter for DVT's    HX OTHER SURGICAL      Marquis placed 3 weeks ago    HX VASCULAR ACCESS  1/14/14    CREATION LEFT UPPER ARM ARTERIO VENOUS GRAFT   (7mm PTFE)       Home meds:   Prior to Admission medications    Medication Sig Start Date End Date Taking? Authorizing Provider   sodium bicarbonate 650 mg tablet TAKE 3 TABLETS BY MOUTH 3 TIMES A DAY 7/21/19   Provider, Historical       Allergies:   Allergies   Allergen Reactions    Codeine Nausea and Vomiting    Iodinated Contrast Media Itching    Shellfish Derived Hives     And shrimp       FH:  Family History   Problem Relation Age of Onset    Heart Disease Other     Kidney Disease Other         kidney stones    Diabetes Mother     Hypertension Mother        SH:  Social History     Tobacco Use    Smoking status: Former Smoker    Smokeless tobacco: Never Used    Tobacco comment: quit about a year ago    Substance Use Topics    Alcohol use: No       ROS: A comprehensive review of systems was negative except for that written in the HPI. PHYSICAL EXAM:  Visit Vitals  BP (!) 120/94   Pulse 80   Temp 98.3 °F (36.8 °C)   Resp 23   Ht 5' 5\" (1.651 m)   Wt 70.3 kg (155 lb)   SpO2 100%   BMI 25.79 kg/m²     General:  Alert, cooperative, no distress, appears stated age. Eyes:  Conjunctivae/corneas clear. PERRL, EOMs intact. Fundi benign   Ears:  Normal TMs and external ear canals both ears. Nose: Nares normal. Septum midline. Mucosa normal. No drainage or sinus tenderness. Mouth/Throat: Lips, mucosa, and tongue normal. Teeth and gums normal.   Neck: Supple, symmetrical, trachea midline, no adenopathy, thyroid: no enlargment/tenderness/nodules, no carotid bruit and no JVD. Back:   Symmetric, no curvature. ROM normal. No CVA tenderness. Lungs:   Clear to auscultation bilaterally. Heart:  Regular rate and rhythm, S1, S2 normal, no murmur, click, rub or gallop. Abdomen:   Soft, non-tender. Bowel sounds normal. No masses,  No organomegaly. Left flank tenderness. Extremities: Extremities normal, atraumatic, no cyanosis or edema. Pulses: 2+ and symmetric all extremities. Skin: Skin color, texture, turgor normal. No rashes or lesions   Lymph nodes: Cervical, supraclavicular, and axillary nodes normal.   Neurologic: CNII-XII intact. Normal strength, sensation and reflexes throughout.        Labs/Imaging:  Recent Results (from the past 24 hour(s))   CBC WITH AUTOMATED DIFF    Collection Time: 08/28/19  1:02 PM   Result Value Ref Range    WBC 9.1 4.1 - 11.1 K/uL    RBC 3.37 (L) 4.10 - 5.70 M/uL    HGB 9.7 (L) 12.1 - 17.0 g/dL    HCT 31.9 (L) 36.6 - 50.3 %    MCV 94.7 80.0 - 99.0 FL    MCH 28.8 26.0 - 34.0 PG    MCHC 30.4 30.0 - 36.5 g/dL    RDW 14.7 (H) 11.5 - 14.5 %    PLATELET 518 593 - 967 K/uL    MPV 9.5 8.9 - 12.9 FL    NRBC 0.0 0  WBC    ABSOLUTE NRBC 0.00 0.00 - 0.01 K/uL    NEUTROPHILS 72 32 - 75 %    LYMPHOCYTES 18 12 - 49 %    MONOCYTES 6 5 - 13 %    EOSINOPHILS 2 0 - 7 %    BASOPHILS 1 0 - 1 %    IMMATURE GRANULOCYTES 1 (H) 0.0 - 0.5 %    ABS. NEUTROPHILS 6.6 1.8 - 8.0 K/UL    ABS. LYMPHOCYTES 1.7 0.8 - 3.5 K/UL    ABS. MONOCYTES 0.6 0.0 - 1.0 K/UL    ABS. EOSINOPHILS 0.2 0.0 - 0.4 K/UL    ABS. BASOPHILS 0.1 0.0 - 0.1 K/UL    ABS. IMM. GRANS. 0.1 (H) 0.00 - 0.04 K/UL    DF AUTOMATED     METABOLIC PANEL, COMPREHENSIVE    Collection Time: 08/28/19  1:02 PM   Result Value Ref Range    Sodium 140 136 - 145 mmol/L    Potassium 4.0 3.5 - 5.1 mmol/L    Chloride 112 (H) 97 - 108 mmol/L    CO2 18 (L) 21 - 32 mmol/L    Anion gap 10 5 - 15 mmol/L    Glucose 108 (H) 65 - 100 mg/dL    BUN 92 (H) 6 - 20 MG/DL    Creatinine 11.10 (H) 0.70 - 1.30 MG/DL    BUN/Creatinine ratio 8 (L) 12 - 20      GFR est AA 6 (L) >60 ml/min/1.73m2    GFR est non-AA 5 (L) >60 ml/min/1.73m2    Calcium 6.4 (LL) 8.5 - 10.1 MG/DL    Bilirubin, total 0.4 0.2 - 1.0 MG/DL    ALT (SGPT) 14 12 - 78 U/L    AST (SGOT) 13 (L) 15 - 37 U/L    Alk.  phosphatase 100 45 - 117 U/L    Protein, total 8.5 (H) 6.4 - 8.2 g/dL    Albumin 3.2 (L) 3.5 - 5.0 g/dL    Globulin 5.3 (H) 2.0 - 4.0 g/dL    A-G Ratio 0.6 (L) 1.1 - 2.2     LIPASE    Collection Time: 08/28/19  1:02 PM   Result Value Ref Range    Lipase 955 (H) 73 - 393 U/L   SAMPLES BEING HELD    Collection Time: 08/28/19  1:02 PM   Result Value Ref Range    SAMPLES BEING HELD 1blue 1red [facility]     COMMENT        Add-on orders for these samples will be processed based on acceptable specimen integrity and analyte stability, which may vary by analyte.    URINALYSIS W/MICROSCOPIC    Collection Time: 08/28/19  1:02 PM   Result Value Ref Range    Color YELLOW/STRAW      Appearance CLOUDY (A) CLEAR      Specific gravity 1.008 1.003 - 1.030      pH (UA) 6.0 5.0 - 8.0      Protein 100 (A) NEG mg/dL    Glucose NEGATIVE  NEG mg/dL    Ketone NEGATIVE  NEG mg/dL    Bilirubin NEGATIVE  NEG      Blood MODERATE (A) NEG      Urobilinogen 0.2 0.2 - 1.0 EU/dL    Nitrites NEGATIVE  NEG      Leukocyte Esterase LARGE (A) NEG      WBC >100 (H) 0 - 4 /hpf    RBC 5-10 0 - 5 /hpf    Epithelial cells FEW FEW /lpf    Bacteria NEGATIVE  NEG /hpf   EKG, 12 LEAD, INITIAL    Collection Time: 08/28/19  1:49 PM   Result Value Ref Range    Ventricular Rate 58 BPM    Atrial Rate 58 BPM    P-R Interval 140 ms    QRS Duration 96 ms    Q-T Interval 462 ms    QTC Calculation (Bezet) 453 ms    Calculated P Axis 64 degrees    Calculated R Axis 1 degrees    Calculated T Axis 43 degrees    Diagnosis       Sinus bradycardia with sinus arrhythmia  Nonspecific T wave abnormality  When compared with ECG of 21-SEP-2018 06:48,  Nonspecific T wave abnormality now evident in Inferior leads  Confirmed by Veronica Baxter M.D., [facility] ([id number]) on 8/28/2019 4:25:25 PM     TROPONIN I    Collection Time: 08/28/19  2:30 PM   Result Value Ref Range    Troponin-I, Qt. <0.05 <0.05 ng/mL   AMB POC HEMOGLOBIN A1C    Collection Time: 08/28/19  3:43 PM   Result Value Ref Range    Hemoglobin A1c (POC) 5.0 %   AMB POC LIPID PROFILE    Collection Time: 08/28/19  3:44 PM   Result Value Ref Range    Cholesterol (POC) 118     Triglycerides (POC) 48     HDL Cholesterol (POC) 49     LDL Cholesterol (POC) 60 MG/DL    Non-HDL Goal (POC) 69     TChol/HDL Ratio (POC) 2.4    AMB POC URINE, MICROALBUMIN, SEMIQUANT (3 RESULTS)    Collection Time: 08/28/19  3:45 PM   Result Value Ref Range    ALBUMIN, URINE POC 0 Negative mg/L    CREATININE, URINE POC 1 mg/dL    Microalbumin/creat ratio (POC) >300 <30 MG/G       Recent Labs     08/28/19  1302   WBC 9.1   HGB 9.7*   HCT 31.9*        Recent Labs     08/28/19  1302      K 4.0   *   CO2 18*   BUN 92*   CREA 11.10*   *   CA 6.4*     Recent Labs     08/28/19  1302   SGOT 13*   ALT 14      TBILI 0.4   TP 8.5*   ALB 3.2*   GLOB 5.3*   LPSE 955*       Recent Labs     08/28/19  1430   TROIQ <0.05       No results for input(s): INR, PTP, APTT in the last 72 hours. No lab exists for component: INREXT     No results for input(s): PH, PCO2, PO2 in the last 72 hours. Assessment & Plan:   UTI with left flak pain suspect pyelonephritis,significant h/o VUR  -Start ceftriaxone. Urine cx. Renal US. Elevated lipase,chemical pancreatitis without clinical sxs: NPO, iv fluids. Repeat Labs in AM    ESRD,not on HD. Metabolci acidosis. Not overtly uremic nor fluid overloaded. He has right arm AVF. Left arm access thrombosed. History of non-adherence  -patient claims his previous behavior was driven by fear and he now is committed to follow direction and wants to try a different nephrology group. Hypocalcemia:  Iv nico gluconate     DVT ppx: scd  Code status: full  Disposition: anticipate home                 Signed By: Paulie Hill MD     August 28, 2019

## 2019-08-28 NOTE — PROGRESS NOTES
Establish Care       Subjective:   HPI     36year old Mr. Dyllan Kamara presents to [facility]. Hx of ESRD on dialysis but has not had dialysis in year. States no place will except him anymore due to reported hx of \"non-compliance. He reports more fear rather non-compliance. He reports the following history and medical concerns:  Left flank pain and tenderness. Hx of frequent UTIs and vesicoureteral reflux; Crohns Disease. Past Medical History:   Diagnosis Date    [facility]F) [facility] 7/30/2015    Chronic kidney disease     pt on hemodialysis d/t reflux    Congenital hydroureteronephrosis     [facility]hn disease ([facility]     [facility] ([facility]          ESRD (end stage renal disease) ([facility]     on HD 8000-6121    Gastrointestinal disorder     Chron's    VUR (vesicoureteric reflux)        Past Surgical History:   Procedure Laterality Date    HX OTHER SURGICAL  Pt has a filter for DVT's    HX OTHER SURGICAL      Marquis placed 3 weeks ago    HX VASCULAR ACCESS  1/14/14    CREATION LEFT UPPER ARM ARTERIO VENOUS GRAFT   (7mm PTFE)       Prior to Admission medications    Medication Sig Start Date End Date Taking?  Authorizing Provider   sodium bicarbonate 650 mg tablet TAKE 3 TABLETS BY MOUTH 3 TIMES A DAY 7/21/19  Yes Provider, Historical         Allergies   Allergen Reactions    Codeine Nausea and Vomiting    Iodinated Contrast Media Itching    Shellfish Derived Hives     And shrimp        Social History     Socioeconomic History    Marital status: SINGLE     Spouse name: Not on file    Number of children: Not on file    Years of education: Not on file    Highest education level: Not on file   Occupational History    Not on file   Social Needs    Financial resource strain: Not on file    Food insecurity:     Worry: Not on file     Inability: Not on file    Transportation needs:     Medical: Not on file     Non-medical: Not on file   Tobacco Use    Smoking status: Former Smoker    Smokeless tobacco: Never Used    Tobacco comment: quit about a year ago    Substance and Sexual Activity    Alcohol use: No    Drug use: No    Sexual activity: Yes     Partners: Female   Lifestyle    Physical activity:     Days per week: Not on file     Minutes per session: Not on file    Stress: Not on file   Relationships    Social connections:     Talks on phone: Not on file     Gets together: Not on file     Attends Amish service: Not on file     Active member of club or organization: Not on file     Attends meetings of clubs or organizations: Not on file     Relationship status: Not on file    Intimate partner violence:     Fear of current or ex partner: Not on file     Emotionally abused: Not on file     Physically abused: Not on file     Forced sexual activity: Not on file   Other Topics Concern    Not on file   Social History Narrative    Not on file        Family History   Problem Relation Age of Onset    Heart Disease Other     Kidney Disease Other         kidney stones    Diabetes Mother     Hypertension Mother         Review of Systems   Constitutional: Negative for chills, diaphoresis, fever, malaise/fatigue and weight loss. HENT: Negative for congestion, ear discharge, ear pain, hearing loss, nosebleeds, sinus pain, sore throat and tinnitus. Eyes: Negative for blurred vision, double vision, photophobia, pain, discharge and redness. Respiratory: Negative for cough, shortness of breath and wheezing. Cardiovascular: Negative for chest pain, palpitations and leg swelling. Gastrointestinal: Negative for abdominal pain, constipation, diarrhea, heartburn, nausea and vomiting. Genitourinary: Positive for dysuria, flank pain and frequency. Musculoskeletal: Negative for myalgias. Skin: Negative for itching and rash. Neurological: Negative for dizziness and headaches. Endo/Heme/Allergies: Negative for polydipsia. Does not bruise/bleed easily. Psychiatric/Behavioral: Negative for depression. Assessment/ Plan:     Vitals:    08/28/19 0851   BP: 136/78   Pulse: 62   Resp: 16   Temp: 96.9 °F (36.1 °C)   TempSrc: Oral   SpO2: 100%   Weight: 155 lb 8 oz (70.5 kg)   Height: 5' 9\" (1.753 m)   PainSc:   6   PainLoc: Rib Cage        Physical Exam   Constitutional: He is oriented to person, place, and time. He appears well-nourished. HENT:   Head: Normocephalic and atraumatic. Right Ear: External ear normal.   Left Ear: External ear normal.   Nose: Nose normal.   Mouth/Throat: Oropharynx is clear and moist. No oropharyngeal exudate. Eyes: Pupils are equal, round, and reactive to light. Conjunctivae are normal.   Neck: Normal range of motion. Neck supple. No thyromegaly present. Cardiovascular: Regular rhythm and normal heart sounds. Pulmonary/Chest: Effort normal and breath sounds normal.   Abdominal: Soft. Bowel sounds are normal. He exhibits no distension. Musculoskeletal:   Shunt right arm. Inactive shunt left arm. Left side flank pain. Lymphadenopathy:     He has no cervical adenopathy. Neurological: He is alert and oriented to person, place, and time. No cranial nerve deficit. Coordination normal.   Skin: Skin is warm and dry. Psychiatric: He has a normal mood and affect. Nursing note and vitals reviewed. ICD-10-CM ICD-9-CM    1. Encounter for medical examination to establish care Z00.00 V70.9 AMB POC HEMOGLOBIN A1C      AMB POC LIPID PROFILE      CBC W/O DIFF      METABOLIC PANEL, COMPREHENSIVE   2. Encounter to establish care with new doctor Z76.89 V65.8 AMB POC HEMOGLOBIN A1C      AMB POC LIPID PROFILE   3.  ESRD on dialysis (HCC) N18.6 585.6 CBC W/O DIFF    H57.5 T93.41 METABOLIC PANEL, COMPREHENSIVE      AMB POC URINALYSIS DIP STICK AUTO W/O MICRO      AMB POC URINE, MICROALBUMIN, SEMIQUANT (3 RESULTS)   4. Vesicoureteral reflux N13.70 593.70 CBC W/O DIFF      METABOLIC PANEL, COMPREHENSIVE      AMB POC URINALYSIS DIP STICK AUTO W/O MICRO      AMB POC URINE, MICROALBUMIN, SEMIQUANT (3 RESULTS)   5. Frequent UTI N39.0 599.0 AMB POC URINALYSIS DIP STICK AUTO W/O MICRO      AMB POC URINE, MICROALBUMIN, SEMIQUANT (3 RESULTS)   6. Cro[facility]'s disease of small intestine with abscess ([facility] K50.014 555.0 CBC W/O DIFF   7. Screening for diabetes mellitus Z13.1 V77.1 AMB POC HEMOGLOBIN A1C   8. Screening for cholesterol level Z13.220 V77.91 AMB POC LIPID PROFILE   9. Impaired glucose tolerance  R73.02 790.22 AMB POC HEMOGLOBIN A1C   10. Medicare annual wellness visit, initial Z00.00 V70.0    11. Acute left flank pain R10.9 789.09 AMB POC URINALYSIS DIP STICK AUTO W/O MICRO     338.19 AMB POC URINE, MICROALBUMIN, SEMIQUANT (3 RESULTS)        Orders Placed This Encounter    CBC W/O DIFF    METABOLIC PANEL, COMPREHENSIVE    AMB POC HEMOGLOBIN A1C    AMB POC LIPID PROFILE    AMB POC URINALYSIS DIP STICK AUTO W/O MICRO    AMB POC URINE, MICROALBUMIN, SEMIQUANT (3 RESULTS)    sodium bicarbonate 650 mg tablet     Sig: TAKE 3 TABLETS BY MOUTH 3 TIMES A DAY     Refill:  0        Assessment/ Plan:       [address] [facility] ER; evaluate left flank pain; r/o kidney infection. Has not had dialysis in a year. Former [facility] [address] patient and patient of Dr. Margarita Barker. Unable to check patient's urine sample due to non-functioning equipment. I have reviewed the patient's medical history in detail and updated the computerized patient record. We had a prolonged discussion about these complex clinical issues and went over the various important aspects to consider. All questions were answered. Advised him to call back or return to office if symptoms do not improve, change in nature, or persist. F/u post ER visit. He was given an after visit summary or informed of [facility] which includes patient instructions, diagnoses, current medications, & vitals. He expressed understanding with the diagnosis and plan.       Pareshcarlos Mcclellan DNP

## 2019-08-28 NOTE — ED PROVIDER NOTES
36 y.o. male with past medical history significant for ESRD, Chron's disease, and DVT presents as a referral from his PCP due to left-sided flank pain which started last night. Patient states that his discomfort is pleuritic in nature. He denies any injury to the left flank. He rates his discomfort as a 5 out of 10 in severity. Patient denies fever rash hemoptysis leg swelling or recent travel out of the country. There are no other acute medical complaints at this time.     PCP: Other, MD Annie Poe PA-C           Past Medical History:   Diagnosis Date    Acute renal failure (ARF) ([address] 7/30/2015    Chronic kidney disease     pt on hemodialysis d/t reflux    Congenital hydroureteronephrosis     Crohn disease ([address]     DVT (deep venous thrombosis) ([address]          ESRD (end stage renal disease) ([address]     on HD 8667-5526    Gastrointestinal disorder     Chron's    VUR (vesicoureteric reflux)        Past Surgical History:   Procedure Laterality Date    HX OTHER SURGICAL  Pt has a filter for DVT's    HX OTHER SURGICAL      Marquis placed 3 weeks ago    HX VASCULAR ACCESS  1/14/14    CREATION LEFT UPPER ARM ARTERIO VENOUS GRAFT   (7mm PTFE)         Family History:   Problem Relation Age of Onset    Heart Disease Other     Kidney Disease Other         kidney stones    Diabetes Mother     Hypertension Mother        Social History     Socioeconomic History    Marital status: SINGLE     Spouse name: Not on file    Number of children: Not on file    Years of education: Not on file    Highest education level: Not on file   Occupational History    Not on file   Social Needs    Financial resource strain: Not on file    Food insecurity:     Worry: Not on file     Inability: Not on file    Transportation needs:     Medical: Not on file     Non-medical: Not on file   Tobacco Use    Smoking status: Former Smoker    Smokeless tobacco: Never Used    Tobacco comment: quit about a year ago Substance and Sexual Activity    Alcohol use: No    Drug use: No    Sexual activity: Yes     Partners: Female   Lifestyle    Physical activity:     Days per week: Not on file     Minutes per session: Not on file    Stress: Not on file   Relationships    Social connections:     Talks on phone: Not on file     Gets together: Not on file     Attends Oriental orthodox service: Not on file     Active member of club or organization: Not on file     Attends meetings of clubs or organizations: Not on file     Relationship status: Not on file    Intimate partner violence:     Fear of current or ex partner: Not on file     Emotionally abused: Not on file     Physically abused: Not on file     Forced sexual activity: Not on file   Other Topics Concern    Not on file   Social History Narrative    Not on file         ALLERGIES: Codeine; Iodinated contrast media; and Shellfish derived    Review of Systems   Constitutional: Negative for chills, diaphoresis and fever. HENT: Negative for congestion, postnasal drip, rhinorrhea and sore throat. Eyes: Negative for photophobia, discharge, redness and visual disturbance. Respiratory: Negative for cough, chest tightness, shortness of breath and wheezing. Cardiovascular: Negative for chest pain, palpitations and leg swelling. Gastrointestinal: Negative for abdominal distention, abdominal pain, blood in stool, constipation, diarrhea, nausea and vomiting. Genitourinary: Negative for difficulty urinating, dysuria, frequency, hematuria and urgency. Musculoskeletal: Positive for arthralgias and myalgias. Negative for back pain and joint swelling. Skin: Negative for color change and rash. Neurological: Negative for dizziness, speech difficulty, weakness, light-headedness, numbness and headaches. Psychiatric/Behavioral: Negative for confusion. The patient is not nervous/anxious. All other systems reviewed and are negative.       Vitals:    08/28/19 1245 08/28/19 1333 08/28/19 1700   BP:  123/75 (!) 120/94   Pulse: 68 73 80   Resp:  16 23   Temp:  98.3 °F (36.8 °C)    SpO2: 100% 98% 100%   Weight:  70.3 kg (155 lb)    Height:  5' 5\" (1.651 m)             Physical Exam   Constitutional: He is oriented to person, place, and time. He appears well-developed and well-nourished. No distress. HENT:   Head: Normocephalic and atraumatic. Head is without raccoon's eyes, without Curry's sign and without laceration. Right Ear: Hearing, tympanic membrane, external ear and ear canal normal. No foreign bodies. Tympanic membrane is not bulging. No hemotympanum. Left Ear: Hearing, tympanic membrane, external ear and ear canal normal. No foreign bodies. Tympanic membrane is not bulging. No hemotympanum. Nose: Nose normal. No mucosal edema or rhinorrhea. Right sinus exhibits no maxillary sinus tenderness and no frontal sinus tenderness. Left sinus exhibits no maxillary sinus tenderness and no frontal sinus tenderness. Mouth/Throat: Uvula is midline, oropharynx is clear and moist and mucous membranes are normal. No tonsillar abscesses. Eyes: Pupils are equal, round, and reactive to light. Conjunctivae and EOM are normal. Right eye exhibits no discharge. Left eye exhibits no discharge. Neck: Normal range of motion. Neck supple. Cardiovascular: Normal rate, regular rhythm and normal heart sounds. Exam reveals no gallop and no friction rub. No murmur heard. Regular rate and rhythm. No murmurs, gallops, rubs, or clicks. Pulmonary/Chest: Effort normal and breath sounds normal. No respiratory distress. He has no wheezes. He has no rales. No stridor or wheezes. No accessory muscle usage. No nasal flaring. Breath Sounds equal bilaterally. Abdominal: Soft. Bowel sounds are normal. He exhibits no distension. There is no tenderness. There is no rebound and no guarding. No abdominal Bruits. No pulsatile mass. No abdominal scars. Active bowel sounds.      Musculoskeletal: Normal range of motion. He exhibits no edema, tenderness or deformity. Neurological: He is alert and oriented to person, place, and time. Skin: He is not diaphoretic. Nursing note and vitals reviewed. MDM  Number of Diagnoses or Management Options  Acute pancreatitis, unspecified complication status, unspecified pancreatitis type:   ESRD (end stage renal disease) ([facility]): Hypercalcemia:   Diagnosis management comments: Today patient's GFR is 5 and his creatinine of 11. He is supposed to begin dialysis immediately. Patient also appears to have acute pancreatitis. He also has a calcium of 6.4.  VQ scan negative for pulmonary embolus. Spoke to hospitalist (Dr. Evelyn Deng) he will admit to his service.   Imelda Marc PA-C         Procedures

## 2019-08-28 NOTE — PROGRESS NOTES
Admission Medication Reconciliation:    Information obtained from: This medication history was obtained from the patient; (s)he appears to be an adequate historian. An RX Query is available. Summary: No significant changes were made to the PTA medication list.           Chief Complaint for this Admission:  uremia, elevated lipase     Significant PMH/Disease States:   Past Medical History:   Diagnosis Date    Acute renal failure (ARF) ([facility] [facility] 7/30/2015    Chronic kidney disease     pt on hemodialysis d/t reflux    Congenital hydroureteronephrosis     Crohn disease ([facility] [address]     DVT (deep venous thrombosis) (HCC)          ESRD (end stage renal disease) ([facility] [address]     on HD [id number]    Gastrointestinal disorder     Chron's    VUR (vesicoureteric reflux)        Allergies:  Codeine; Iodinated contrast media; and Shellfish derived    Prior to Admission Medications:   Prior to Admission Medications   Prescriptions Last Dose Informant Patient Reported? Taking?   sodium bicarbonate 650 mg tablet 8/28/2019 at am  Yes Yes   Sig: Take 1,950 mg by mouth three (3) times daily. Facility-Administered Medications: None         Thank you for allowing me to participate in the care of this patient. Please contact the pharmacy () or the medication reconciliation pharmacist () with any questions. Kristyn Jim Pharm. D., BCPS, BCPPS

## 2019-08-28 NOTE — ED TRIAGE NOTES
Triage Note: Patient is coming in for left side flank pain that started last night. Patient denies injury. Patient was sent from PCP office for further evaluation.

## 2019-08-28 NOTE — ROUTINE PROCESS
TRANSFER - OUT REPORT:    Verbal report given to Vladimir(name) on Byron Lockhart  being transferred to (unit) for routine progression of care       Report consisted of patients Situation, Background, Assessment and   Recommendations(SBAR). Information from the following report(s) SBAR was reviewed with the receiving nurse. Lines:   Peripheral IV 08/28/19 Left Forearm (Active)   Site Assessment Clean, dry, & intact 8/28/2019  1:54 PM   Phlebitis Assessment 0 8/28/2019  1:54 PM   Infiltration Assessment 0 8/28/2019  1:54 PM   Dressing Status Clean, dry, & intact 8/28/2019  1:54 PM   Dressing Type Transparent 8/28/2019  1:54 PM   Hub Color/Line Status Patent; Flushed;Capped;Pink 8/28/2019  1:54 PM   Action Taken Blood drawn 8/28/2019  1:54 PM        Opportunity for questions and clarification was provided.       Patient transported with:   [email or web address]

## 2019-08-28 NOTE — PATIENT INSTRUCTIONS
Crohn's Disease: Care Instructions  Your Care Instructions    Crohn's disease is a lifelong inflammatory bowel disease (IBD). Parts of the digestive tract get swollen and irritated and may develop deep sores called ulcers. Crohn's disease usually occurs in the last part of the small intestine and the first part of the large intestine. But it can develop anywhere from the mouth to the anus. The main symptoms of Crohn's disease are belly pain, diarrhea, fever, and weight loss. Some people may have constipation. Crohn's disease also sometimes causes problems with the joints, eyes, or skin. Your symptoms may be mild at some times and severe at others. The disease can also go into remission, which means that it is not active and you have no symptoms. Bad attacks of Crohn's disease often have to be treated in the hospital so that you can get medicines and liquids through a tube in your vein, called an IV. This gives your digestive system time to rest and recover. Talk with your doctor about the best treatments for you. You may need medicines that help prevent or treat flare-ups of the disease. You may need surgery to remove part of your bowel if you have an abnormal opening in the bowel (fistula), an abscess, or a bowel obstruction. In some cases, surgery is needed if medicines do not work. But symptoms often return to other areas of the intestines after surgery. Learning good self-care can help you reduce your symptoms and manage Crohn's disease. Follow-up care is a key part of your treatment and safety. Be sure to make and go to all appointments, and call your doctor if you are having problems. It's also a good idea to know your test results and keep a list of the medicines you take. How can you care for yourself at home? · Take your medicines exactly as prescribed. Call your doctor if you think you are having a problem with your medicine.  You will get more details on the specific medicines your doctor prescribes. · Do not take anti-inflammatory medicines, such as aspirin, ibuprofen (Advil, Motrin), or naproxen (Aleve). They may make your symptoms worse. Do not take any other medicines or herbal products without talking to your doctor first.  · Avoid foods that make your symptoms worse. These might include milk, alcohol, high-fiber foods, or spicy foods. · Eat a healthy diet. Make sure to get enough iron. Rectal bleeding may make you lose iron. Good sources of iron include beef, lentils, spinach, raisins, and iron-enriched breads and cereals. · Drink liquid meal replacements if your doctor recommends them. These are high in calories and contain vitamins and minerals. Severe symptoms may make it hard for your body to absorb vitamins and minerals. · Do not smoke. Smoking makes Crohn's disease worse. If you need help quitting, talk to your doctor about stop-smoking programs and medicines. These can increase your chances of quitting for good. · Seek support from friends and family to help cope with Crohn's disease. The illness can affect all parts of your life. Get counseling if you need it. When should you call for help? Call 911 anytime you think you may need emergency care. For example, call if:    · Your stools are maroon or very bloody.     · You passed out (lost consciousness).    Call your doctor now or seek immediate medical care if:    · You are vomiting.     · You have new or worse belly pain.     · You have a fever.     · You cannot pass stools or gas.     · You have new or more blood in your stools.    Watch closely for changes in your health, and be sure to contact your doctor if:    · You have new or worse symptoms.     · You are losing weight.     · You do not get better as expected. Where can you learn more? Go to http://roselyn-francia.info/. Enter 21 [phone] in the search box to learn more about \"Crohn's Disease: Care Instructions. \"  Current as of: November 7, 2018  Content Version: 12.1  © 7342-8716 [employer]. Care instructions adapted under license by [employer] (which disclaims liability or warranty for this information). If you have questions about a medical condition or this instruction, always ask your healthcare professional. [address] any warranty or liability for your use of this information. Kidney Dialysis: Care Instructions  Your Care Instructions    Dialysis is a process that filters wastes from the blood when your kidneys can no longer do the job. It is not a cure, but it can help you live longer and feel better. It is a lifesaving treatment when you have kidney failure. Normal kidneys work 24 hours a day to clean wastes from your blood. Your kidneys are not able to do this job, so a process called dialysis will do some of the work for your kidneys. You and your doctor will decide which type of dialysis you should have. Peritoneal dialysis uses the lining of your belly (peritoneum) to filter your blood. You can do it at home, on a daily basis. Hemodialysis uses a man-made filter called a dialyzer to clean your blood. Most people need to go to a hospital or clinic 3 days a week for several hours each time. Sometimes hemodialysis can be done at home. It is normal to have questions about your treatment, and you have a right to know what is happening to you. Learning about dialysis can help you take an active role in your treatment. Dialysis does not cure kidney disease, but it can help you live longer and feel better. You will need to follow your diet and treatment schedule carefully. Follow-up care is a key part of your treatment and safety. Be sure to make and go to all appointments, and call your doctor if you are having problems. It's also a good idea to know your test results and keep a list of the medicines you take. What do you need to know about peritoneal dialysis?   Peritoneal dialysis uses the lining of your belly (or peritoneal membrane) to filter your blood. Before you can begin peritoneal dialysis, your doctor will need to place a thin tube called a catheter in your belly. This is the dialysis access. · Peritoneal dialysis can be done at home or in any clean place. You may be able to do it while you sleep. · You can do it by yourself. You do not have to rely on help from others. · You can do it at the times you choose as long as you do the right number of treatments. · It has to be done every day of the week. · Some people find it hard to do all the required steps. · It increases your chance for a serious infection of the lining of the belly (peritoneum). What do you need to know about hemodialysis? Hemodialysis uses a man-made membrane called a dialyzer to clean your blood. You are connected to the dialyzer by tubes attached to your blood vessels. Before you start hemodialysis, your doctor will create a site where the blood can flow in and out of your body during your dialysis sessions. This site is called the vascular access. It may be a fistula, made by connecting an artery and a vein. Or it may be a graft, which is a tube implanted under your skin. · Hemodialysis is done mainly by trained health workers who can watch for any problems. · It allows you to be in contact with other people having dialysis. This can help provide emotional support. · You can schedule your treatments in the evenings so you can keep working. · You may be able to do home hemodialysis, which gives you more control over your schedule. · It usually needs to be done on a set schedule 3 times a week. · It can cause side effects. The most common side effects are low blood pressure and muscle cramps. These can often be treated easily. · It requires needle sticks during every treatment, which bothers some people. Others get used to it and even do the needle sticks themselves. How can you care for yourself at home?   · Be sure to have all of your dialysis sessions. Do not try to shorten or skip your sessions. You have a better chance of a longer and healthier life by getting your full treatment. · Your doctor or health care team will show you the steps you need to go through each day before, during, and after dialysis. Be sure to follow these steps. If you do not understand a step, talk to your team.  · Your doctor and dietitian will help you design menus that follow your diet. Be sure to follow your diet guidelines. ? You will need to limit fluids and certain foods that contain salt (sodium), potassium, and phosphorus. ? You may need to follow a heart-healthy diet to keep the fat (cholesterol) in your blood under control. ? You may need higher levels of protein in your diet. · Your doctor may recommend certain vitamins. But do not take any other medicine, including over-the-counter medicines, vitamins, and herbal products, without talking to your doctor first.  · Do not smoke. Smoking raises your risk of many health problems, including more kidney damage. If you need help quitting, talk to your doctor about stop-smoking programs and medicines. These can increase your chances of quitting for good. · Do not take ibuprofen (Advil, Motrin), naproxen (Aleve), or similar medicines, unless your doctor tells you to. These medicines may make kidney problems worse. When should you call for help? Call your doctor now or seek immediate medical care if:    · You have a fever.     · You are dizzy or lightheaded, or you feel like you may faint.     · You are confused or cannot think clearly.     · You have new or worse nausea or vomiting.     · You have new or more blood in your urine.     · You have new swelling.    Watch closely for changes in your health, and be sure to contact your doctor if:    · You do not get better as expected. Where can you learn more? Go to http://roselyn-francia.info/.   Enter K659 in the search box to learn more about \"Kidney Dialysis: Care Instructions. \"  Current as of: October 31, 2018  Content Version: 12.1  © 9528-2655 [employer]. Care instructions adapted under license by [employer] (which disclaims liability or warranty for this information). If you have questions about a medical condition or this instruction, always ask your healthcare professional. [address] any warranty or liability for your use of this information.

## 2019-08-28 NOTE — PROGRESS NOTES
.. TRANSFER - IN REPORT:    Verbal report received from Thad(name) on Brady Wilkerson  being received from ED(unit) for routine progression of care      Report consisted of patients Situation, Background, Assessment and   Recommendations(SBAR). Information from the following report(s) SBAR, Kardex and MAR was reviewed with the receiving nurse. Opportunity for questions and clarification was provided. Assessment completed upon patients arrival to unit and care assumed.

## 2019-08-29 LAB
ALBUMIN SERPL-MCNC: 2.8 G/DL (ref 3.5–5)
ALBUMIN/GLOB SERPL: 0.6 {RATIO} (ref 1.1–2.2)
ALP SERPL-CCNC: 80 U/L (ref 45–117)
ALT SERPL-CCNC: 14 U/L (ref 12–78)
AMYLASE SERPL-CCNC: 184 U/L (ref 25–115)
ANION GAP SERPL CALC-SCNC: 12 MMOL/L (ref 5–15)
AST SERPL-CCNC: 14 U/L (ref 15–37)
BILIRUB SERPL-MCNC: 0.3 MG/DL (ref 0.2–1)
BUN SERPL-MCNC: 98 MG/DL (ref 6–20)
BUN/CREAT SERPL: 9 (ref 12–20)
CALCIUM SERPL-MCNC: 6.7 MG/DL (ref 8.5–10.1)
CHLORIDE SERPL-SCNC: 115 MMOL/L (ref 97–108)
CO2 SERPL-SCNC: 13 MMOL/L (ref 21–32)
CREAT SERPL-MCNC: 10.9 MG/DL (ref 0.7–1.3)
GLOBULIN SER CALC-MCNC: 4.7 G/DL (ref 2–4)
GLUCOSE SERPL-MCNC: 79 MG/DL (ref 65–100)
IRON SATN MFR SERPL: 17 % (ref 20–50)
IRON SERPL-MCNC: 37 UG/DL (ref 35–150)
LIPASE SERPL-CCNC: 689 U/L (ref 73–393)
POTASSIUM SERPL-SCNC: 4.1 MMOL/L (ref 3.5–5.1)
PROT SERPL-MCNC: 7.5 G/DL (ref 6.4–8.2)
SODIUM SERPL-SCNC: 140 MMOL/L (ref 136–145)
TIBC SERPL-MCNC: 222 UG/DL (ref 250–450)

## 2019-08-29 PROCEDURE — 65270000029 HC RM PRIVATE

## 2019-08-29 PROCEDURE — 36415 COLL VENOUS BLD VENIPUNCTURE: CPT

## 2019-08-29 PROCEDURE — 74011250636 HC RX REV CODE- 250/636: Performed by: HOSPITALIST

## 2019-08-29 PROCEDURE — 83690 ASSAY OF LIPASE: CPT

## 2019-08-29 PROCEDURE — 74011250637 HC RX REV CODE- 250/637: Performed by: INTERNAL MEDICINE

## 2019-08-29 PROCEDURE — 83540 ASSAY OF IRON: CPT

## 2019-08-29 PROCEDURE — 74011000258 HC RX REV CODE- 258: Performed by: HOSPITALIST

## 2019-08-29 PROCEDURE — 82150 ASSAY OF AMYLASE: CPT

## 2019-08-29 PROCEDURE — 80053 COMPREHEN METABOLIC PANEL: CPT

## 2019-08-29 PROCEDURE — 74011250637 HC RX REV CODE- 250/637: Performed by: HOSPITALIST

## 2019-08-29 RX ORDER — SODIUM BICARBONATE 650 MG/1
1300 TABLET ORAL 3 TIMES DAILY
Status: DISCONTINUED | OUTPATIENT
Start: 2019-08-29 | End: 2019-09-02

## 2019-08-29 RX ADMIN — HYDROCODONE BITARTRATE AND ACETAMINOPHEN 1 TABLET: 5; 325 TABLET ORAL at 03:13

## 2019-08-29 RX ADMIN — HYDROCODONE BITARTRATE AND ACETAMINOPHEN 1 TABLET: 5; 325 TABLET ORAL at 07:22

## 2019-08-29 RX ADMIN — SODIUM BICARBONATE 650 MG: 650 TABLET ORAL at 15:45

## 2019-08-29 RX ADMIN — SODIUM CHLORIDE 75 ML/HR: 900 INJECTION, SOLUTION INTRAVENOUS at 14:08

## 2019-08-29 RX ADMIN — CEFTRIAXONE SODIUM 1 G: 1 INJECTION, POWDER, FOR SOLUTION INTRAMUSCULAR; INTRAVENOUS at 18:33

## 2019-08-29 RX ADMIN — SODIUM BICARBONATE 650 MG: 650 TABLET ORAL at 11:24

## 2019-08-29 RX ADMIN — SODIUM BICARBONATE 1300 MG: 650 TABLET ORAL at 21:37

## 2019-08-29 RX ADMIN — CALCIUM GLUCONATE 2 G: 98 INJECTION, SOLUTION INTRAVENOUS at 00:12

## 2019-08-29 RX ADMIN — Medication 10 ML: at 21:38

## 2019-08-29 RX ADMIN — HYDROCODONE BITARTRATE AND ACETAMINOPHEN 1 TABLET: 5; 325 TABLET ORAL at 11:24

## 2019-08-29 RX ADMIN — HYDROCODONE BITARTRATE AND ACETAMINOPHEN 1 TABLET: 5; 325 TABLET ORAL at 21:37

## 2019-08-29 NOTE — CONSULTS
[facility]    Name: Salena Mas      Admitted: 2019  MRN #: [id number]      : 1985  Account #: [de-identified]     Age: 36 y.o. Location:[facility]   Physician: Gaby Restrepo MD         REASON FOR ADMISSION:  Principal Problem:    UTI (urinary tract infection) (2019)    Active Problems:    Uremia (2019)      Elevated lipase (2019)        HISTORY OF PRESENT ILLNESS:   Mr. Aj Desai is a 35 y/o M with PMH listed below who presented with left flank pain with no radiation for the past few days. It is associated with dysuria but No fever, hematuria. Pt reports that has been on and off dialysis since . His last HD treatment was in February of this year. Hs currently does not have a dialysis unit as he was discharged because of non compliance. He contributes his non compliance to being anxious and scared of dialysis but now he is willing to be fully complaint and would like to go back on dialysis. He has been experiencing poor appetite with metalic taste, rod loss and itching, In ER labs notable for elevated Cr at 10.9, Co2 13, K 4.1, Na 140, Ca 6.7, Alb 2.8. US renal showed Renal cortical thinning bilaterally with severe hydronephrosis bilaterally. He makes normal amount of urine and has AVF in place. PAST MEDICAL HISTORY:   ESRD     Bilateral hydronephrosis     Vesicoureteral reflux             MEDICATIONS ON ADMISSION:  Prior to Admission medications    Medication Sig Start Date End Date Taking? Authorizing Provider   sodium bicarbonate 650 mg tablet Take 1,950 mg by mouth three (3) times daily.  19  Yes Provider, Historical       ALLERGIES:   Allergies   Allergen Reactions    Codeine Nausea and Vomiting    Iodinated Contrast Media Itching    Shellfish Derived Hives     And shrimp       SOCIAL HISTORY:   Former smoker       FAMILY HISTORY:   Kidney stones     REVIEW OF SYSTEMS:    A comprehensive review of systems was negative except for that written in the HPI    PHYSICAL EXAMINATION:      GENERAL:   He looks ill. He a 36 y.o.  male. VITAL SIGNS:   The patients  height is 5' 5\" (1.651 m) and weight is 70.5 kg (155 lb 6.8 oz). His temperature is 98.3 °F (36.8 °C). His blood pressure is 114/66 and his pulse is 56 (abnormal). His respiration is 17 and oxygen saturation is 99%. He is afebrile. Physical Exam:  General appearance: alert, cooperative, no distress, appears stated age.   Lungs: No crackle, no wheezes   Heart: regular rate and rhythm, no S3 or S4, no rub  Abdomen: soft, non-tender. Bowel sounds normal.  Extremities: no edema, RUE AVF +bruit   Neurologic: A&o X3, non focal   Psych: calm. Normal insight           LABORATORY DATA:   BMP:   Lab Results   Component Value Date/Time     08/29/2019 03:15 AM    K 4.1 08/29/2019 03:15 AM     (H) 08/29/2019 03:15 AM    CO2 13 (LL) 08/29/2019 03:15 AM    AGAP 12 08/29/2019 03:15 AM    GLU 79 08/29/2019 03:15 AM    BUN 98 (H) 08/29/2019 03:15 AM    CREA 10.90 (H) 08/29/2019 03:15 AM    GFRAA 7 (L) 08/29/2019 03:15 AM    GFRNA 5 (L) 08/29/2019 03:15 AM      No results found for this visit on 08/28/19 (from the past 12 hour(s)). RADIOLOGY:   CLINICAL HISTORY: Left flank pain  Comparison: CT 1/3/2017. FINDINGS:   Ultrasound examination of the kidneys.     RIGHT KIDNEY: measures 12.1 cm. LEFT KIDNEY: measures 12.6 cm. ABDOMINAL AORTA: Normal   IVC: Normal   BLADDER: Not well distended. Irregular in appearance. Renal cortical thinning bilaterally with hydronephrosis that is severe on the  right and on the left. Echogenic renal cortices. Trace left-sided pleural  effusion. There no large shadowing renal calculi however small stones cannot be excluded.     IMPRESSION  IMPRESSION:      Renal cortical thinning bilaterally with severe hydronephrosis bilaterally. IMPRESSION:    ESRD: Etiology is VUR.  Has been on HD on and off since 2009. Last HD was in February 2019. Current lynn not have an HD chair in the community. Chronic bilateral hydronephrosis: Per Dr Los Rivera note:  He has seen by urologist in the past who recommended to continue dialysis as he is already in ESRD.       Hypocalcemia: Was given calcium gluconate 2 gx1 by primary service     UTI: on Abx     Met acidosis: 2/2 CKD     Pancreatitis     PLAN:     Given uremic symptoms,I would recommend resuming RRT. Ordered short HD treatment tomorrow to avoid disequilibrium syndrome. HD again on Saturday. (Janet Awan acute team was notified)     Please consult  for out patient HD chair.      Increase sodium bicarb to 1300 mg tid     Dose all meds to ESRD on HD     Check serum phos, PTH and 25 Vit D     Check iron stores     Avoid nephrotoxins including NSAIDs and IV contrast to preserve residual kidney function         Juan Singletary MD  8/29/2019

## 2019-08-29 NOTE — PROGRESS NOTES
Primary Nurse Gely Rebolledo RN and Arie Jackson RN performed a dual skin assessment on this patient Impairment noted- see wound doc flow sheet  Wilfred score is 22    Scar on LUE (old fistula), RUE and abdomen.

## 2019-08-29 NOTE — PROGRESS NOTES
Hospitalist Progress Note  Kirstie Tillman MD  Answering service: [phone] from in house phone      Date of Service:  2019  NAME:  Antony Melgoza                                                         :  1985                                               MRN:  [id number]      Subjective/interval history    -left flank pain better. He tolerated clear liquid diet,no abdominal pain    Assessment and plan  UTI with left flak pain suspect pyelonephritis,significant h/o VUR  -Continue ceftriaxone. Urine cx  -Renal US:Renal cortical thinning bilaterally with severe hydronephrosis bilaterally,the bilateral hydronephrosis is chronic. He has seen by urologist in the past who recommended to continue dialysis as he is already in ESRD. 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 Advance diet as tolerated. lipase trending down     ESRD,not on HD. Metabolci acidosis. Not overtly uremic nor fluid overloaded. He has right arm AVF. Left arm access thrombosed. History of non-adherence  -patient claims his previous behavior was driven by fear and he now is committed to follow direction and wants to try a different nephrology group. Hypocalcemia: Iv nico gluconate      DVT ppx: scd  Code status: full  Disposition: anticipate home                   Current facility administered and prior to admit medications reviewed. x         Review of Systems:  A comprehensive review of systems was negative except for that written in the HPI. PHYSICAL EXAM:  O:  Visit Vitals  /66 (BP 1 Location: Left arm, BP Patient Position: At rest)   Pulse (!) 56 Comment: notified nurses   Temp 98.3 °F (36.8 °C)   Resp 17   Ht 5' 5\" (1.651 m)   Wt 70.5 kg (155 lb 6.8 oz)   SpO2 99%   BMI 25.86 kg/m²       General Appears comfortable,not in distress. HEENT Head atraumatic. Unicteric sclera. Moist buccal mucosa. PERRLA     CVS RRR, no MRG, no JVD, no peripheral edema       Chest No deformity  No accessory muscle use  Vesicular air entry symmetrically  No wheezing,ronchi or crepitations       Abdomen &Pelvis Not distended. Normoactive. Soft. Non tender. No hepatomegally       Genitourinary  No CVA or suprapubic tenderness       Musculoskeletal Left thrombosed avf  Right arm AVF +thrill       Skin No erythema,rash,depigmentation       Neurology Mental status: alert and oriented x4  Cranial nerves: CN 2-12 intact. Motor 5/5 through out     Psychiatry            Intake/Output Summary (Last 24 hours) at 8/29/2019 1458  Last data filed at 8/29/2019 0654  Gross per 24 hour   Intake 651 ml   Output 400 ml   Net 251 ml          Recent labs & imaging reviewed:    Problem List as of 8/29/2019 Date Reviewed: 9/21/2018          Codes Class Noted - Resolved    Acute on chronic renal failure [facility]) ICD-10-CM: N17.9, N18.9  ICD-9-CM: 584.9, 585.9 Present on Admission 8/11/2015 - Present        Crohn disease ([facility] [address] ICD-10-CM: K50.90  ICD-9-CM: 489. 9 Chronic 8/11/2015 - Present        Vesicoureteral reflux with nephropathy (Chronic) ICD-10-CM: N13.729  ICD-9-CM: 593.73  7/30/2015 - Present        Uremia ICD-10-CM: N19  ICD-9-CM: 586  8/28/2019 - Present        Elevated lipase ICD-10-CM: R74.8  ICD-9-CM: 790.5  8/28/2019 - Present        * (Principal) UTI (urinary tract infection) ICD-10-CM: N39.0  ICD-9-CM: 599.0  8/28/2019 - Present        Thrombosis of arteriovenous dialysis fistula (HCC) ICD-10-CM: C20.543X  ICD-9-CM: 996.73  10/23/2015 - Present        Acute internal jugular vein thrombosis (HCC) ICD-10-CM: P80.S60  ICD-9-CM: 453.86  8/18/2015 - Present        ESRF (end stage renal failure) ([facility] ICD-10-CM: N18.6  ICD-9-CM: 585.6  7/30/2015 - Present    Overview Signed 7/30/2015 12:08 PM by Rosana Amaro MD     Has been on and off dialysis.   Not on it at the time of admission due to non-compliance             Hydroureteronephrosis ICD-10-CM: N13.30  ICD-9-CM: 591  8/31/2013 - Present        ESRD on dialysis [facility]) ICD-10-CM: N18.6, Z99.2  ICD-9-CM: 585.6, V45.11  8/31/2013 - Present                Arden Shelton MD  Internal Medicine  Date of Service: 8/29/2019

## 2019-08-29 NOTE — PROGRESS NOTES
Reason for Admission:  Patient in house for flank pain. Patient is a pleasant young man who was alert and oriented during the assessment, who explains that he was not compliant with HD in the past due to fear of blood when on the HD machine. Patient explained that he was not mature enough at the time to get help with his issue and is not ready to attend HD as directed. Patient lives with his girlfriend and is currently expecting a baby girl with her in 2 months. Patient also has 3 boys ages 15, 8, & 6. The patient advised CM that he \"doesn't want to be selfish by not taking care of myself because I wants to be around for my children\". Patient is independent with ADL's and IADL's and uses no assisting devices. Patient uses room air and is not on home oxygen. Patient advised he has reliable transportation to get to and from appointments and his girl friend will transport at discharge. The patient was assigned a PCP during this visit. 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 CM noted consult to assist with restarting HD. CM will monitor for nephrologist being assigned to insure referral is sent to the appropriate HD company. RRAT Score:   8                  Plan for utilizing home health:      No plans for home health at this time. Current Advanced Directive/Advance Care Plan:  Not on file. Transition of Care Plan:             1. Consult to restart HD-CM monitoring for Nephrologist to be assigned to send referral to the appropriate HD clinic. 2. CM will send referral to HD clinic when appropriate   3. Family will transport patient at discharge.             CM will follow   PABLITO Marroquin/SARWAT

## 2019-08-29 NOTE — PROGRESS NOTES
NUTRITION COMPLETE ASSESSMENT    RECOMMENDATIONS:   1. Advance diet as medically feasible. Pt with poor appetite, would recommend less restrictive diet such as low Na+ vs renal since K+ WNL  2. Check Phos  3. RD to add Nepro supplement once diet advanced  4. Add Nephro Cap daily     Interventions/Plan:   Food/Nutrient Delivery:  General/healthful diet Commercial supplement        Nutrition Education:Survival information, Purpose of nutrition education    Coordination of Care: Collaboration with other providers    Assessment:   Reason for Assessment: MST/BPA Referral      Diet: Clear liquids  Supplements: RD to add Nepro when diet advanced  Nutritionally Significant Medications: NS @ 75 mL/hr, Rocephin, Norco, NaHCO3  Meal Intake: No data found. Pre-Hospitalization:  Usual Appetite: Poor    Current Hospitalization:   Fluid Restriction:    Appetite: Poor  PO Ability: Independent Average po intake:   Average supplements intake:        Subjective:  I haven't been eating much, but they said that'll get better once I start dialysis    Objective:  37 yo male admitted with   Chief Complaint   Patient presents with    Flank Pain     Patient Active Problem List   Diagnosis Code    Hydroureteronephrosis N13.30    ESRD on dialysis ([facility] N18.6, Z99.2    ESRF (end stage renal failure) ([facility]) N18.6    Vesicoureteral reflux with nephropathy N13.729    Crohn disease ([facility] K50.90    Acute on chronic renal failure ([address] N17.9, N18.9    Acute internal jugular vein thrombosis ([facility] I82. C19    Thrombosis of arteriovenous dialysis fistula ([facility]) T82.868A    Uremia N19    Elevated lipase R74.8    UTI (urinary tract infection) N39.0     Pt reports he hasn't been to dialysis in 6-7 months and his appetite has been very poor as of recently. Denies N/V for the most part, however when he goes all day without eating and then finally becomes hungry towards the end of the day, he becomes nauseated.   Reports his GF has been trying to make him eat healthier and cut out his salt. He reports he has been educated on renal diet in the past but was receptive to some brief review. He is willing to try Nepro once his diet is advanced. Pt reports he is to start dialysis in AM.    Past Medical History:   Diagnosis Date    Acute renal failure (ARF) ([facility] 7/30/2015    Chronic kidney disease     pt on hemodialysis d/t reflux    Congenital hydroureteronephrosis     Crohn disease ([facility]     DVT (deep venous thrombosis) ([facility])          ESRD (end stage renal disease) ([facility] [address] [facility]     on HD [id number]    Gastrointestinal disorder     Chron's    VUR (vesicoureteric reflux)           Cultural, Taoist and ethnic food preferences identified:      Skin Integrity: intact  Edema: none  Last BM: 8/28/19  Food Allergies: shellfish  Diet Restrictions:       Anthropometrics:    Weight Loss Metrics 8/29/2019 8/28/2019 8/28/2019 8/28/2019 9/21/2018 1/3/2017 12/6/2015   Today's Wt 155 lb 6.8 oz - 155 lb 8 oz - 158 lb 11.7 oz 157 lb 3 oz 173 lb 1 oz   BMI - 25.86 kg/m2 - 22.96 kg/m2 23.44 kg/m2 25.37 kg/m2 27.95 kg/m2         Height: 5' 5\" (165.1 cm),    Body mass index is 25.86 kg/m². IBW : 61.8 kg (136 lb 3.9 oz), % IBW (Calculated): 114.08 %  Usual Body Weight: 74.8 kg (165 lb)(165-170 lb),      Labs:    Recent Labs     08/29/19  0315 08/28/19  1302   GLU 79 108*   BUN 98* 92*   CREA 10.90* 11.10*    140   K 4.1 4.0   * 112*   CO2 13* 18*   CA 6.7* 6.4*       No results for input(s): GLUCPOC in the last 72 hours. Lab Results   Component Value Date/Time    Hemoglobin A1c (POC) 5.0 08/28/2019 03:43 PM       Estimated Nutrition Needs:   Kcals/day: 2400 Kcals/day(BMR 1600 x 1.5)  Protein: 85 g(minimum (using 1.2+ gm/kg))  Fluid: 2100 ml(30 mL/kg but defer to nephrology d/t dialysis)  Based On:  Shakila Waters  Weight Used: Actual wt    Pt expected to meet estimated nutrient needs:  []   Yes     []  No [x] Unable to predict at this time  Nutrition Diagnosis:   1. Altered nutrition-related lab values related to non-compliant ESRD as evidenced by Cr 10.9; BUN 98    2.  Unintended weight loss related to poor appetite d/t non-compliance with dialysis as evidenced by pt reports eating only 1 meal in the evening and even that makes him nauseated; 10-15 # weight loss in past 6 months      Goals:     diet advancement in next 24-48 hours; prevent further weight loss and promote weight gain     Monitoring & Evaluation:    - Total energy intake, Protein intake, Liquid meal replacement   - Weight/weight change, Electrolyte and renal profile   -      Previous Nutrition Goals Met:   N/A  Previous Recommendations:    N/A    Education & Discharge Needs:   [] None Identified   [x] Identified and addressed    [x] Participated in care plan, discharge planning, and/or interdisciplinary rounds            Wendy Gomez RD

## 2019-08-30 LAB
25(OH)D3 SERPL-MCNC: 14.2 NG/ML (ref 30–100)
ANION GAP SERPL CALC-SCNC: 12 MMOL/L (ref 5–15)
BACTERIA SPEC CULT: NORMAL
BUN SERPL-MCNC: 93 MG/DL (ref 6–20)
BUN/CREAT SERPL: 9 (ref 12–20)
CALCIUM SERPL-MCNC: 6.2 MG/DL (ref 8.5–10.1)
CC UR VC: NORMAL
CHLORIDE SERPL-SCNC: 111 MMOL/L (ref 97–108)
CO2 SERPL-SCNC: 16 MMOL/L (ref 21–32)
CREAT SERPL-MCNC: 10.7 MG/DL (ref 0.7–1.3)
GLUCOSE SERPL-MCNC: 92 MG/DL (ref 65–100)
HBV SURFACE AG SER QL: <0.1 INDEX
HBV SURFACE AG SER QL: NEGATIVE
LIPASE SERPL-CCNC: 481 U/L (ref 73–393)
PHOSPHATE SERPL-MCNC: 5.5 MG/DL (ref 2.6–4.7)
POTASSIUM SERPL-SCNC: 4.2 MMOL/L (ref 3.5–5.1)
SERVICE CMNT-IMP: NORMAL
SODIUM SERPL-SCNC: 139 MMOL/L (ref 136–145)

## 2019-08-30 PROCEDURE — 74011250636 HC RX REV CODE- 250/636: Performed by: HOSPITALIST

## 2019-08-30 PROCEDURE — 65270000029 HC RM PRIVATE

## 2019-08-30 PROCEDURE — 74011000258 HC RX REV CODE- 258: Performed by: HOSPITALIST

## 2019-08-30 PROCEDURE — 84100 ASSAY OF PHOSPHORUS: CPT

## 2019-08-30 PROCEDURE — 74011250637 HC RX REV CODE- 250/637: Performed by: HOSPITALIST

## 2019-08-30 PROCEDURE — 36415 COLL VENOUS BLD VENIPUNCTURE: CPT

## 2019-08-30 PROCEDURE — 90935 HEMODIALYSIS ONE EVALUATION: CPT

## 2019-08-30 PROCEDURE — 82306 VITAMIN D 25 HYDROXY: CPT

## 2019-08-30 PROCEDURE — 80048 BASIC METABOLIC PNL TOTAL CA: CPT

## 2019-08-30 PROCEDURE — 83690 ASSAY OF LIPASE: CPT

## 2019-08-30 PROCEDURE — 5A1D70Z PERFORMANCE OF URINARY FILTRATION, INTERMITTENT, LESS THAN 6 HOURS PER DAY: ICD-10-PCS | Performed by: INTERNAL MEDICINE

## 2019-08-30 PROCEDURE — 87340 HEPATITIS B SURFACE AG IA: CPT

## 2019-08-30 PROCEDURE — 74011250637 HC RX REV CODE- 250/637: Performed by: INTERNAL MEDICINE

## 2019-08-30 RX ADMIN — HYDROCODONE BITARTRATE AND ACETAMINOPHEN 1 TABLET: 5; 325 TABLET ORAL at 12:45

## 2019-08-30 RX ADMIN — CEFTRIAXONE SODIUM 1 G: 1 INJECTION, POWDER, FOR SOLUTION INTRAMUSCULAR; INTRAVENOUS at 19:25

## 2019-08-30 RX ADMIN — Medication 10 ML: at 20:53

## 2019-08-30 RX ADMIN — HYDROCODONE BITARTRATE AND ACETAMINOPHEN 1 TABLET: 5; 325 TABLET ORAL at 20:57

## 2019-08-30 RX ADMIN — SODIUM BICARBONATE 1300 MG: 650 TABLET ORAL at 15:39

## 2019-08-30 RX ADMIN — Medication 10 ML: at 03:20

## 2019-08-30 RX ADMIN — Medication 10 ML: at 15:39

## 2019-08-30 RX ADMIN — SODIUM BICARBONATE 1300 MG: 650 TABLET ORAL at 20:53

## 2019-08-30 NOTE — PROCEDURES
[facility] Dialysis Team [facility] Acutes  [phone]    Vitals   Pre   Post   Assessment   Pre   Post     Temp  Temp: 97.8 °F (36.6 °C) (08/30/19 0812) 98.1 LOC  A&Ox4 A&Ox4, conversive   HR   Pulse (Heart Rate): 60 (08/30/19 0930) 55 Lungs   Clear, RA Remains on RA   B/P   BP: 126/74 (08/30/19 0930) 129/50   Cardiac   NSR, RRR RRR   Resp   Resp Rate: 16 (08/30/19 0919) 18 Skin   Warm, dry, intact No change   Pain level  Pain Intensity 1: 5 (08/29/19 0850) 0 Edema  None     None   Orders:    Duration:   Start:    0920 End:    1110 Total:   1 hr 40 mins   Dialyzer:   Dialyzer/Set Up Inspection: Drea Donning (08/30/19 0919)   K Bath:   Dialysate K (mEq/L): 2 (08/30/19 0919)   Ca Bath:   Dialysate CA (mEq/L): 2.5 (08/30/19 0919)   Na/Bicarb:   Dialysate NA (mEq/L): 138 (08/30/19 0919)   Target Fluid Removal:   Goal/Amount of Fluid to Remove (mL): 0 mL (08/30/19 0919)   Access     Type & Location:   ADONIS AV    Labs     Obtained/Reviewed   Critical Results Called   Date when labs were drawn-  Hgb-    HGB   Date Value Ref Range Status   08/28/2019 9.7 (L) 12.1 - 17.0 g/dL Final     K-    Potassium   Date Value Ref Range Status   08/30/2019 4.2 3.5 - 5.1 mmol/L Final     Ca-   Calcium   Date Value Ref Range Status   08/30/2019 6.2 (LL) 8.5 - 10.1 MG/DL Final     Comment:     RESULTS VERIFIED, PHONED TO AND READ BACK BY  YASEMIN Foster@[employer]       Bun-   BUN   Date Value Ref Range Status   08/30/2019 93 (H) 6 - 20 MG/DL Final     Creat-   Creatinine   Date Value Ref Range Status   08/30/2019 10.70 (H) 0.70 - 1.30 MG/DL Final      Medications/ Blood Products Given     Name   Dose   Route and Time     None given                Blood Volume Processed (BVP):   19.8 L Net Fluid   Removed:  0 ml   Comments   Time Out Done: 2427  Primary Nurse Rpt Pre:   Primary Nurse Rpt PostSherlie JACOB Nieves  Pt Education: Consent, Access care  Care Plan:  Tx Summary:  0821: Safety check. Timeout performed.    0920: VIVIENNE RUSHING assessed, +bruit/thrill, no redness, warmth or drainage. Skin prepped per procedure using alcohol x 60 sec each site. Cannulated with 16 g needles and secured with tape. +flash/aspiration/flush. 1110: patient c/o pain at access site after using urinal. Venous access mildly swollen, -aspiration/flush. All possible blood returned to the patient. Brooke Rhodes MD notified advised to terminate treatment and rest arm. De-cannulated and pressure held until hemostasis achieved. +bruit/thrill. Dressing applied. VSS. SBAR called to primary RN.     Admitting Diagnosis: ESRD  Pt's previous clinic: none  Informed Consent Verified: Yes (08/30/19 0919)  [facility] Consent: Obtained  Hepatitis Status: HBsAg: neg (08/30/19)  Machine Number: [id number] (08/30/19 0919)  Telemetry status: no monitored

## 2019-08-30 NOTE — PROGRESS NOTES
CM sent referral to [facility] with medicals to assist with restarting HD via allscripts. Patient is interested in [address] location and prefers early morning appointments so that he can go to work after HD. REJI  1. HD-T/TH/SAT schedule  2. Restart HD-Referral sent to [facility] to assist. Patient interested in [address] location but CM was not able to find this location in All[employer]. 3. Family will transport at discharge.      CM will follow   PABLITO Marroquin/SARWAT

## 2019-08-30 NOTE — PROGRESS NOTES
Name: Cori Ramirez MRN: [id number]   : 1985 Hospital: . [address]   Date: 2019        IMPRESSION:   · ESRD on HD. Patient was seen during HD treatment in the HD suite. His access just infiltrated. · H/O non compliance with HD Txs. Patient is discharged from his home unit. PLAN:   · D/C HD now ( 20 mins before the scheduled time) and allow the arm to rest.  · HD again in AM  ·  is looking for a permanent placement. Patient was educated regarding the importance of compliance. Subjective/Interval History:   I have reviewed the flowsheet and previous days notes. ROS:A comprehensive review of systems was negative. Objective:   Vital Signs:    Visit Vitals  /50   Pulse (!) 55   Temp 97.8 °F (36.6 °C)   Resp 16   Ht 5' 5\" (1.651 m)   Wt 68.8 kg (151 lb 10.8 oz)   SpO2 100%   BMI 25.24 kg/m²       O2 Device: Room air       Temp (24hrs), Av.4 °F (36.9 °C), Min:97.8 °F (36.6 °C), Max:98.9 °F (37.2 °C)       Intake/Output:   Last shift:       07 -  1900  In: -   Out: -90   Last 3 shifts:  190 -  0700  In: 651 [P.O.:651]  Out: 1600 [Urine:1600]    Intake/Output Summary (Last 24 hours) at 2019 1155  Last data filed at 2019 1118  Gross per 24 hour   Intake    Output 1110 ml   Net -1110 ml        Physical Exam:  General:    Alert, cooperative, no distress, appears stated age. HD treatment is in progress. Head:   Normocephalic, without obvious abnormality, atraumatic. Eyes:   Conjunctivae/corneas clear. Lungs:   Clear to auscultation bilaterally. No Wheezing or Rhonchi. No rales. Chest wall:  No tenderness or deformity. No Accessory muscle use. Heart:   Regular rate and rhythm,  no murmur, rub or gallop. Abdomen:   Soft, non-tender. Not distended. Bowel sounds normal. No masses  Extremities: Extremities normal, atraumatic, No cyanosis. No edema. No clubbing  Skin:     Texture, turgor normal. No rashes or lesions.   Not Jaundiced  Lymph nodes: Cervical, supraclavicular normal.  Psych:  Good insight. Not depressed. Not anxious or agitated. Neurologic: Normal strength, Alert and oriented X 3. DATA:  Labs:  Recent Labs     08/30/19  0309 08/29/19  0315 08/28/19  1302    140 140   K 4.2 4.1 4.0   * 115* 112*   CO2 16* 13* 18*   BUN 93* 98* 92*   CREA 10.70* 10.90* 11.10*   CA 6.2* 6.7* 6.4*   ALB  --  2.8* 3.2*   PHOS 5.5*  --   --      Recent Labs     08/28/19  1302   WBC 9.1   HGB 9.7*   HCT 31.9*        No results for input(s): UMM, KU, CLU, CREAU in the last 72 hours.     No lab exists for component: PROU    Total time spent with patient:  35 minutes    [] Critical Care Provided    Care Plan discussed with:   Erika Perdue MD

## 2019-08-30 NOTE — PROGRESS NOTES
Hospitalist Progress Note  Paulina Lucero MD  Answering service: [phone] from in house phone      Date of Service:  2019  NAME:  Helena Curtis                                                         :  1985                                               MRN:  [id number]      Subjective/interval history    -Seen earlier during dialysis. Watching movie,no complaints. Assessment and plan  UTI with left flak pain suspect pyelonephritis,significant h/o VUR  -Continue ceftriaxone. Urine cx  -Renal US:Renal cortical thinning bilaterally with severe hydronephrosis bilaterally,the bilateral hydronephrosis is chronic. He has seen by urologist in the past who recommended to continue dialysis as he is already in ESRD. 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 Advance diet as tolerated. lipase trending down     ESRD,not on HD. Metabolci acidosis. He has right arm AVF. Left arm access thrombosed. History of non-adherence  -patient claims his previous behavior was driven by fear and he now is committed to follow direction and wants to try a different nephrology group. -HD started on      Hypocalcemia:received  Iv nico gluconate      DVT ppx: scd  Code status: full  Disposition: anticipate home                   Current facility administered and prior to admit medications reviewed. x         Review of Systems:  A comprehensive review of systems was negative except for that written in the HPI. PHYSICAL EXAM:  O:  Visit Vitals  /68 (BP 1 Location: Left arm, BP Patient Position: At rest)   Pulse 71   Temp 98.3 °F (36.8 °C)   Resp 16   Ht 5' 5\" (1.651 m)   Wt 68.8 kg (151 lb 10.8 oz)   SpO2 98%   BMI 25.24 kg/m²       General Appears comfortable,not in distress. HEENT Head atraumatic. Unicteric sclera. Moist buccal mucosa. PERRLA     CVS RRR, no MRG, no JVD, no peripheral edema       Chest No deformity  No accessory muscle use  Vesicular air entry symmetrically  No wheezing,ronchi or crepitations       Abdomen &Pelvis Not distended. Normoactive. Soft. Non tender. No hepatomegally       Genitourinary  No CVA or suprapubic tenderness       Musculoskeletal Left thrombosed avf  Right arm AVF +thrill       Skin No erythema,rash,depigmentation       Neurology Mental status: alert and oriented x4  Cranial nerves: CN 2-12 intact. Motor 5/5 through out     Psychiatry            Intake/Output Summary (Last 24 hours) at 8/30/2019 1755  Last data filed at 8/30/2019 1118  Gross per 24 hour   Intake    Output 1110 ml   Net -1110 ml          Recent labs & imaging reviewed:    Problem List as of 8/30/2019 Date Reviewed: 9/21/2018          Codes Class Noted - Resolved    Acute on chronic renal failure (HCC) ICD-10-CM: N17.9, N18.9  ICD-9-CM: 584.9, 585.9 Present on Admission 8/11/2015 - Present        Crohn disease ([facility] ICD-10-CM: K50.90  ICD-9-CM: 896. 9 Chronic 8/11/2015 - Present        Vesicoureteral reflux with nephropathy (Chronic) ICD-10-CM: N13.729  ICD-9-CM: 593.73  7/30/2015 - Present        Uremia ICD-10-CM: N19  ICD-9-CM: 586  8/28/2019 - Present        Elevated lipase ICD-10-CM: R74.8  ICD-9-CM: 790.5  8/28/2019 - Present        * (Principal) UTI (urinary tract infection) ICD-10-CM: N39.0  ICD-9-CM: 599.0  8/28/2019 - Present        Thrombosis of arteriovenous dialysis fistula (HCC) ICD-10-CM: M88.960Q  ICD-9-CM: 996.73  10/23/2015 - Present        Acute internal jugular vein thrombosis (HCC) ICD-10-CM: E62.X02  ICD-9-CM: 453.86  8/18/2015 - Present        ESRF (end stage renal failure) ([facility] ICD-10-CM: N18.6  ICD-9-CM: 585.6  7/30/2015 - Present    Overview Signed 7/30/2015 12:08 PM by Angela Brown MD     Has been on and off dialysis.   Not on it at the time of admission due to non-compliance             Hydroureteronephrosis ICD-10-CM: N13.30  ICD-9-CM: 591  8/31/2013 - Present        ESRD on dialysis [facility]) ICD-10-CM: N18.6, Z99.2  ICD-9-CM: 585.6, V45.11  8/31/2013 - Present                Stephanie Clemente MD  Internal Medicine  Date of Service: 8/30/2019

## 2019-08-31 LAB
ANION GAP SERPL CALC-SCNC: 10 MMOL/L (ref 5–15)
BUN SERPL-MCNC: 70 MG/DL (ref 6–20)
BUN/CREAT SERPL: 8 (ref 12–20)
CALCIUM SERPL-MCNC: 6.7 MG/DL (ref 8.5–10.1)
CHLORIDE SERPL-SCNC: 107 MMOL/L (ref 97–108)
CO2 SERPL-SCNC: 21 MMOL/L (ref 21–32)
CREAT SERPL-MCNC: 9.01 MG/DL (ref 0.7–1.3)
GLUCOSE SERPL-MCNC: 92 MG/DL (ref 65–100)
LIPASE SERPL-CCNC: 463 U/L (ref 73–393)
POTASSIUM SERPL-SCNC: 4 MMOL/L (ref 3.5–5.1)
SODIUM SERPL-SCNC: 138 MMOL/L (ref 136–145)

## 2019-08-31 PROCEDURE — 90935 HEMODIALYSIS ONE EVALUATION: CPT

## 2019-08-31 PROCEDURE — 36415 COLL VENOUS BLD VENIPUNCTURE: CPT

## 2019-08-31 PROCEDURE — 74011250637 HC RX REV CODE- 250/637: Performed by: INTERNAL MEDICINE

## 2019-08-31 PROCEDURE — 83690 ASSAY OF LIPASE: CPT

## 2019-08-31 PROCEDURE — 65270000029 HC RM PRIVATE

## 2019-08-31 PROCEDURE — 80048 BASIC METABOLIC PNL TOTAL CA: CPT

## 2019-08-31 PROCEDURE — 74011250637 HC RX REV CODE- 250/637: Performed by: HOSPITALIST

## 2019-08-31 RX ORDER — AMOXICILLIN AND CLAVULANATE POTASSIUM 500; 125 MG/1; MG/1
1 TABLET, FILM COATED ORAL EVERY 24 HOURS
Status: DISCONTINUED | OUTPATIENT
Start: 2019-08-31 | End: 2019-09-06 | Stop reason: HOSPADM

## 2019-08-31 RX ADMIN — HYDROCODONE BITARTRATE AND ACETAMINOPHEN 1 TABLET: 5; 325 TABLET ORAL at 08:18

## 2019-08-31 RX ADMIN — SODIUM BICARBONATE 1300 MG: 650 TABLET ORAL at 08:18

## 2019-08-31 RX ADMIN — Medication 10 ML: at 06:00

## 2019-08-31 RX ADMIN — Medication 10 ML: at 20:52

## 2019-08-31 RX ADMIN — SODIUM BICARBONATE 1300 MG: 650 TABLET ORAL at 20:52

## 2019-08-31 RX ADMIN — AMOXICILLIN AND CLAVULANATE POTASSIUM 1 TABLET: 500; 125 TABLET, FILM COATED ORAL at 20:51

## 2019-08-31 NOTE — PROGRESS NOTES
Day #1 of Augmentin  Indication:  pyelonephritis  Current regimen:  875 mg daily  Abx regimen: Rocephin stopped  Recent Labs     19  0203 19  0309 19  0315   CREA 9.01* 10.70* 10.90*   BUN 70* 93* 98*     Est CrCl: HD   Temp (24hrs), Av.7 °F (37.1 °C), Min:98.2 °F (36.8 °C), Max:99.6 °F (37.6 °C)    Cultures:  urine NG in 2days    Plan: Change to 500 mg q24 hr-

## 2019-08-31 NOTE — PROCEDURES
[facility] Dialysis Team [facility]  [phone]    Vitals   Pre   Post   Assessment   Pre   Post     Temp  98.2  LOC  A&Ox4   A&Ox4, pleasant   HR   68 50 Lungs   CTA, RA   Remains on RA   B/P  120/74 140/71   Cardiac   S1S2, RRR   Bradycardic   Resp   16 18 Skin   Warm, dry, intact   No changes   Pain level  0 0 Edema  none   none   Orders:    Duration:   Start:    1410 End:    1710 Total:   3 hr   Dialyzer:   Dialyzer/Set Up Inspection: Lenora Beckford (08/31/19 1410)   K Bath:   Dialysate K (mEq/L): 2 (08/31/19 1410)   Ca Bath:   Dialysate CA (mEq/L): 3.0 (08/31/19 1410)   Na/Bicarb:   Dialysate NA (mEq/L): 140 (08/31/19 1410)   Target Fluid Removal:   Goal/Amount of Fluid to Remove (mL): 0 mL (08/31/19 1410)   Access     Type & Location:   ADONIS AVG   Labs     Obtained/Reviewed   Critical Results Called   Date when labs were drawn-  Hgb-    HGB   Date Value Ref Range Status   08/28/2019 9.7 (L) 12.1 - 17.0 g/dL Final     K-    Potassium   Date Value Ref Range Status   08/31/2019 4.0 3.5 - 5.1 mmol/L Final     Ca-   Calcium   Date Value Ref Range Status   08/31/2019 6.7 (L) 8.5 - 10.1 MG/DL Final     Bun-   BUN   Date Value Ref Range Status   08/31/2019 70 (H) 6 - 20 MG/DL Final     Creat-   Creatinine   Date Value Ref Range Status   08/31/2019 9.01 (H) 0.70 - 1.30 MG/DL Final      Medications/ Blood Products Given     Name   Dose   Route and Time     None given                Blood Volume Processed (BVP):   42.2 L Net Fluid   Removed:  0 ml   Comments   Time Out Done: 1400  Primary Nurse Rpt Pre: Deneen To RN  Primary Nurse Rpt Post: Deneen To RN  Pt Education: Access precautions  Care Plan: HD today  Tx Summary:  1400: Safety checks complete. Timeout performed. 1410: ADONIS AVG assessed, no redness, warmth or drainage noted. +bruit/thrill. Skin prepped per procedure using alcohol x 60 sec each site. Cannulated with 15g needles each site and secured with tape. +flash/aspiration/flush. HD treatment initiated. Access visible and lines secure. Medications reviewed. 1710: HD treatment complete. All possible blood rinsed back to patient. De-cannulated and pressure held until hemostasis achieved. Dressing applied. VSS, although bradycardic. SBAR called to primary RN. Admitting Diagnosis: ESRD  Pt's previous clinic: no HD since February 2019  Informed Consent Verified: Yes (08/31/19 1410)  Nicolas Consent: Verified  Hepatitis Status: HBsAg: Neg (08/30/19) obtained from [facility] Number: [id number] (08/31/19 1410)       HD TRANSFER - OUT REPORT:    Verbal report given to VERA Tobar on Kaz Kingston being transferred to [facility] for routine progression of care       Report consisted of patient's Situation, Background, Assessment and   Recommendations(SBAR). Information from the following report(s) Procedure Summary was reviewed with the receiving nurse.    $$ Method: Hemodialysis (08/31/19 1410)  NET Fluid Removed (mL): -90 ml (08/30/19 1118)     Patient response to treatment:  Stable    Hemodialysis End Time: 1294 (08/31/19 1715)  If not documented, dialysis nurse to update post-dialysis row in HD/Filtration flowsheet     Medications /Volume expansion agents or Fluid boluses administered during treatment? no  Post-dialysis medication administration due?  no    Fistula hemostasis? yes    Opportunity for questions and clarification was provided.       Patient transported with: [employer]

## 2019-08-31 NOTE — PROGRESS NOTES
Hospitalist Progress Note  Arden Shelton MD  Answering service: [phone] from in house phone      Date of Service:  2019  NAME:  Miriam Howe                                                         :  1985                                               MRN:  [id number]      Subjective/interval history    -No complains  -He is grateful about the dialysis     Assessment and plan  Acute  pyelonephritis,significant h/o VUR  -Switch ceftriaxone to augmenitn. Urine cx,negative. Contineu abx 7-10 days.  -Renal US:Renal cortical thinning bilaterally with severe hydronephrosis bilaterally,the bilateral hydronephrosis is chronic. He has seen by urologist in the past who recommended to continue dialysis as he is already in ESRD. 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 Advance diet as tolerated. lipase trending down     ESRD,not on HD. Metabolci acidosis. He has right arm AVF. Left arm access thrombosed. History of non-adherence  -patient claims his previous behavior was driven by fear and he now is committed to follow direction and wants to try a different nephrology group. -HD started on      Hypocalcemia:received  Iv nico gluconate      DVT ppx: scd  Code status: full  Disposition: anticipate home. He needs dialysis chair prior to discharge. Current facility administered and prior to admit medications reviewed. x         Review of Systems:  A comprehensive review of systems was negative except for that written in the HPI. PHYSICAL EXAM:  O:  Visit Vitals  /74 (BP 1 Location: Left arm, BP Patient Position: At rest)   Pulse (!) 58   Temp 98.2 °F (36.8 °C)   Resp 18   Ht 5' 5\" (1.651 m)   Wt 67 kg (147 lb 12.8 oz)   SpO2 98%   BMI 24.60 kg/m²       General Appears comfortable,not in distress. HEENT Head atraumatic. Unicteric sclera. Moist buccal mucosa. PERRLA     CVS RRR, no MRG, no JVD, no peripheral edema       Chest No deformity  No accessory muscle use  Vesicular air entry symmetrically  No wheezing,ronchi or crepitations       Abdomen &Pelvis Not distended. Normoactive. Soft. Non tender. No hepatomegally       Genitourinary  No CVA or suprapubic tenderness       Musculoskeletal Left thrombosed avf  Right arm AVF +thrill       Skin No erythema,rash,depigmentation       Neurology Mental status: alert and oriented x4  Cranial nerves: CN 2-12 intact. Motor 5/5 through out     Psychiatry            Intake/Output Summary (Last 24 hours) at 8/31/2019 1240  Last data filed at 8/31/2019 0819  Gross per 24 hour   Intake    Output 900 ml   Net -900 ml          Recent labs & imaging reviewed:    Problem List as of 8/31/2019 Date Reviewed: 9/21/2018          Codes Class Noted - Resolved    Acute on chronic renal failure [facility]) ICD-10-CM: N17.9, N18.9  ICD-9-CM: 584.9, 585.9 Present on Admission 8/11/2015 - Present        [facility] ([facility] ICD-10-CM: K50.90  ICD-9-CM: 672. 9 Chronic 8/11/2015 - Present        Vesicoureteral reflux with nephropathy (Chronic) ICD-10-CM: N13.729  ICD-9-CM: 593.73  7/30/2015 - Present        Uremia ICD-10-CM: N19  ICD-9-CM: 586  8/28/2019 - Present        Elevated lipase ICD-10-CM: R74.8  ICD-9-CM: 790.5  8/28/2019 - Present        * (Principal) UTI (urinary tract infection) ICD-10-CM: N39.0  ICD-9-CM: 599.0  8/28/2019 - Present        Thrombosis of arteriovenous dialysis fistula (HCC) ICD-10-CM: L57.153X  ICD-9-CM: 996.73  10/23/2015 - Present        Acute internal jugular vein thrombosis (HCC) ICD-10-CM: B51.O99  ICD-9-CM: 453.86  8/18/2015 - Present        ESRF (end stage renal failure) ([facility] ICD-10-CM: N18.6  ICD-9-CM: 585.6  7/30/2015 - Present    Overview Signed 7/30/2015 12:08 PM by Herbert Triana MD     Has been on and off dialysis.   Not on it at the time of admission due to non-compliance             Hydroureteronephrosis ICD-10-CM: N13.30  ICD-9-CM: 591  8/31/2013 - Present        ESRD on dialysis [facility]) ICD-10-CM: N18.6, Z99.2  ICD-9-CM: 585.6, V45.11  8/31/2013 - Present                Vladimir Jaime MD  Internal Medicine  Date of Service: 8/31/2019

## 2019-08-31 NOTE — PROGRESS NOTES
Name: Marlen Lala MRN: [id number]   : 1985 Hospital: [address]   Date: 2019        IMPRESSION:   · ESRD on HD. Patient scheduled for 2nd treatment today  · H/O non compliance with HD Txs. Patient is discharged from his home unit. PLAN:              HD today  ·  is looking for a permanent placement. Patient was educated regarding the importance of compliance. D/c once dialysis unit located    Will check on Monday, please call if needed before then     Subjective/Interval History:   I have reviewed the flowsheet and previous days notes. ROS:A comprehensive review of systems was negative. Objective:   Vital Signs:    Visit Vitals  /74 (BP 1 Location: Left arm, BP Patient Position: At rest)   Pulse (!) 58   Temp 98.2 °F (36.8 °C)   Resp 18   Ht 5' 5\" (1.651 m)   Wt 67 kg (147 lb 12.8 oz)   SpO2 98%   BMI 24.60 kg/m²       O2 Device: Room air       Temp (24hrs), Av.7 °F (37.1 °C), Min:98.2 °F (36.8 °C), Max:99.6 °F (37.6 °C)       Intake/Output:   Last shift:       0701 -  190  In: -   Out: 900 [Urine:900]  Last 3 shifts:  1901 -  0700  In: -   Out: 1110 [Urine:1200]    Intake/Output Summary (Last 24 hours) at 2019 1342  Last data filed at 2019 0819  Gross per 24 hour   Intake    Output 900 ml   Net -900 ml        Physical Exam:  General:    Alert, cooperative, no distress, appears stated age. Head:   Normocephalic, without obvious abnormality, atraumatic. Eyes:   Conjunctivae/corneas clear. Lungs:   Clear to auscultation bilaterally. No Wheezing or Rhonchi. No rales. Chest wall:  No tenderness or deformity. No Accessory muscle use. Heart:   Regular rate and rhythm,  no murmur, rub or gallop. Abdomen:   Soft, non-tender. Not distended. Bowel sounds normal. No masses  Extremities: Extremities normal, atraumatic, No cyanosis. No edema. No clubbing  Skin:     Texture, turgor normal. No rashes or lesions.   Not Jaundiced  Psych:  Good insight. Not depressed. Not anxious or agitated. Neurologic: Normal strength, Alert and oriented X 3. DATA:  Labs:  Recent Labs     08/31/19  0203 08/30/19  0309 08/29/19  0315    139 140   K 4.0 4.2 4.1    111* 115*   CO2 21 16* 13*   BUN 70* 93* 98*   CREA 9.01* 10.70* 10.90*   CA 6.7* 6.2* 6.7*   ALB  --   --  2.8*   PHOS  --  5.5*  --      No results for input(s): WBC, HGB, HCT, PLT, HGBEXT, HCTEXT, PLTEXT, HGBEXT, HCTEXT, PLTEXT in the last 72 hours. No results for input(s): UMM, KU, CLU, CREAU in the last 72 hours.     No lab exists for component: PROU    Total time spent with patient:  35 minutes    [] Critical Care Provided    Care Plan discussed with:   Nursing, Medical Team    Cyrena Cushing, MD

## 2019-09-01 LAB
ANION GAP SERPL CALC-SCNC: 8 MMOL/L (ref 5–15)
BUN SERPL-MCNC: 46 MG/DL (ref 6–20)
BUN/CREAT SERPL: 7 (ref 12–20)
CALCIUM SERPL-MCNC: 7.2 MG/DL (ref 8.5–10.1)
CHLORIDE SERPL-SCNC: 103 MMOL/L (ref 97–108)
CO2 SERPL-SCNC: 28 MMOL/L (ref 21–32)
CREAT SERPL-MCNC: 7.01 MG/DL (ref 0.7–1.3)
GLUCOSE SERPL-MCNC: 96 MG/DL (ref 65–100)
LIPASE SERPL-CCNC: 503 U/L (ref 73–393)
POTASSIUM SERPL-SCNC: 4 MMOL/L (ref 3.5–5.1)
SODIUM SERPL-SCNC: 139 MMOL/L (ref 136–145)

## 2019-09-01 PROCEDURE — 83690 ASSAY OF LIPASE: CPT

## 2019-09-01 PROCEDURE — 74011250637 HC RX REV CODE- 250/637: Performed by: HOSPITALIST

## 2019-09-01 PROCEDURE — 65270000029 HC RM PRIVATE

## 2019-09-01 PROCEDURE — 36415 COLL VENOUS BLD VENIPUNCTURE: CPT

## 2019-09-01 PROCEDURE — 80048 BASIC METABOLIC PNL TOTAL CA: CPT

## 2019-09-01 PROCEDURE — 74011250637 HC RX REV CODE- 250/637: Performed by: INTERNAL MEDICINE

## 2019-09-01 RX ADMIN — SODIUM BICARBONATE 1300 MG: 650 TABLET ORAL at 08:57

## 2019-09-01 RX ADMIN — SODIUM BICARBONATE 1300 MG: 650 TABLET ORAL at 21:01

## 2019-09-01 RX ADMIN — Medication 10 ML: at 08:58

## 2019-09-01 RX ADMIN — Medication 10 ML: at 21:02

## 2019-09-01 RX ADMIN — AMOXICILLIN AND CLAVULANATE POTASSIUM 1 TABLET: 500; 125 TABLET, FILM COATED ORAL at 15:29

## 2019-09-01 RX ADMIN — ACETAMINOPHEN 650 MG: 325 TABLET, FILM COATED ORAL at 08:57

## 2019-09-01 RX ADMIN — SODIUM BICARBONATE 1300 MG: 650 TABLET ORAL at 15:29

## 2019-09-01 RX ADMIN — Medication 10 ML: at 15:30

## 2019-09-01 NOTE — PROGRESS NOTES
Hospitalist Progress Note  Kirstie Tillman MD  Answering service: [phone] from in house phone      Date of Service:  2019  NAME:  Antony Melgoza                                                         :  1985                                               MRN:  [id number]      Subjective/interval history    -No complains  -He is grateful about the dialysis     Assessment and plan  Acute  pyelonephritis,significant h/o VUR  -Switch ceftriaxone to augmenitn. Urine cx,negative. Contineu abx 7-10 days.  -Renal US:Renal cortical thinning bilaterally with severe hydronephrosis bilaterally,the bilateral hydronephrosis is chronic. He has seen by urologist in the past who recommended to continue dialysis as he is already in ESRD. 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 Advance diet as tolerated. lipase trending down     ESRD,not on HD. Metabolci acidosis. He has right arm AVF. Left arm access thrombosed. History of non-adherence  -patient claims his previous behavior was driven by fear and he now is committed to follow direction and wants to try a different nephrology group. -HD on   and      Hypocalcemia:received  Iv nico gluconate      DVT ppx: scd  Code status: full  Disposition: anticipate home. He needs dialysis chair prior to discharge. Current facility administered and prior to admit medications reviewed. x         Review of Systems:  A comprehensive review of systems was negative except for that written in the HPI. PHYSICAL EXAM:  O:  Visit Vitals  /69 (BP 1 Location: Left arm, BP Patient Position: At rest)   Pulse 79   Temp 99.7 °F (37.6 °C)   Resp 16   Ht 5' 5\" (1.651 m)   Wt 68.5 kg (151 lb 0.2 oz)   SpO2 99%   BMI 25.13 kg/m²       General Appears comfortable,not in distress. HEENT Head atraumatic. Unicteric sclera. Moist buccal mucosa. PERRLA     CVS RRR, no MRG, no JVD, no peripheral edema       Chest No deformity  No accessory muscle use  Vesicular air entry symmetrically  No wheezing,ronchi or crepitations       Abdomen &Pelvis Not distended. Normoactive. Soft. Non tender. No hepatomegally       Genitourinary  No CVA or suprapubic tenderness       Musculoskeletal Left thrombosed avf  Right arm AVF +thrill       Skin No erythema,rash,depigmentation       Neurology Mental status: alert and oriented x4  Cranial nerves: CN 2-12 intact. Motor 5/5 through out     Psychiatry          No intake or output data in the 24 hours ending 09/01/19 1031       Recent labs & imaging reviewed:    Problem List as of 9/1/2019 Date Reviewed: 9/21/2018          Codes Class Noted - Resolved    Acute on chronic renal failure [facility]) ICD-10-CM: N17.9, N18.9  ICD-9-CM: 584.9, 585.9 Present on Admission 8/11/2015 - Present        Crohn disease ([facility] ICD-10-CM: K50.90  ICD-9-CM: 849. 9 Chronic 8/11/2015 - Present        Vesicoureteral reflux with nephropathy (Chronic) ICD-10-CM: N13.729  ICD-9-CM: 593.73  7/30/2015 - Present        Uremia ICD-10-CM: N19  ICD-9-CM: 586  8/28/2019 - Present        Elevated lipase ICD-10-CM: R74.8  ICD-9-CM: 790.5  8/28/2019 - Present        * (Principal) UTI (urinary tract infection) ICD-10-CM: N39.0  ICD-9-CM: 599.0  8/28/2019 - Present        Thrombosis of arteriovenous dialysis fistula (HCC) ICD-10-CM: O23.119Z  ICD-9-CM: 996.73  10/23/2015 - Present        Acute internal jugular vein thrombosis (HCC) ICD-10-CM: M74.R73  ICD-9-CM: 453.86  8/18/2015 - Present        ESRF (end stage renal failure) ([facility] ICD-10-CM: N18.6  ICD-9-CM: 585.6  7/30/2015 - Present    Overview Signed 7/30/2015 12:08 PM by Kevin Wang MD     Has been on and off dialysis.   Not on it at the time of admission due to non-compliance             Hydroureteronephrosis ICD-10-CM: N13.30  ICD-9-CM: 808  8/31/2013 - Present        ESRD on dialysis [facility]) ICD-10-CM: N18.6, Z99.2  ICD-9-CM: 585.6, V45.11  8/31/2013 - Present                Mercedes Solares MD  Internal Medicine  Date of Service: 9/1/2019

## 2019-09-02 LAB
CALCIUM SERPL-MCNC: 6.8 MG/DL (ref 8.5–10.1)
LIPASE SERPL-CCNC: 404 U/L (ref 73–393)
PTH-INTACT SERPL-MCNC: 262 PG/ML (ref 18.4–88)

## 2019-09-02 PROCEDURE — 83970 ASSAY OF PARATHORMONE: CPT

## 2019-09-02 PROCEDURE — 36415 COLL VENOUS BLD VENIPUNCTURE: CPT

## 2019-09-02 PROCEDURE — 65270000029 HC RM PRIVATE

## 2019-09-02 PROCEDURE — 83690 ASSAY OF LIPASE: CPT

## 2019-09-02 PROCEDURE — 74011250637 HC RX REV CODE- 250/637: Performed by: HOSPITALIST

## 2019-09-02 PROCEDURE — 90935 HEMODIALYSIS ONE EVALUATION: CPT

## 2019-09-02 PROCEDURE — 74011250637 HC RX REV CODE- 250/637: Performed by: INTERNAL MEDICINE

## 2019-09-02 RX ORDER — ERGOCALCIFEROL 1.25 MG/1
50000 CAPSULE ORAL
Status: DISCONTINUED | OUTPATIENT
Start: 2019-09-02 | End: 2019-09-06 | Stop reason: HOSPADM

## 2019-09-02 RX ORDER — SEVELAMER CARBONATE 800 MG/1
800 TABLET, FILM COATED ORAL
Status: DISCONTINUED | OUTPATIENT
Start: 2019-09-02 | End: 2019-09-06 | Stop reason: HOSPADM

## 2019-09-02 RX ADMIN — HYDROCODONE BITARTRATE AND ACETAMINOPHEN 1 TABLET: 5; 325 TABLET ORAL at 18:46

## 2019-09-02 RX ADMIN — Medication 10 ML: at 22:26

## 2019-09-02 RX ADMIN — Medication 1 TABLET: at 09:49

## 2019-09-02 RX ADMIN — ASCORBIC ACID, THIAMINE MONONITRATE,RIBOFLAVIN, NIACINAMIDE, PYRIDOXINE HYDROCHLORIDE, FOLIC ACID, CYANOCOBALAMIN, BIOTIN, CALCIUM PANTOTHENATE, 1 CAPSULE: 100; 1.5; 1.7; 20; 10; 1; 6000; 150000; 5 CAPSULE, LIQUID FILLED ORAL at 09:48

## 2019-09-02 RX ADMIN — AMOXICILLIN AND CLAVULANATE POTASSIUM 1 TABLET: 500; 125 TABLET, FILM COATED ORAL at 18:47

## 2019-09-02 RX ADMIN — ERGOCALCIFEROL 50000 UNITS: 1.25 CAPSULE ORAL at 18:47

## 2019-09-02 RX ADMIN — SEVELAMER CARBONATE 800 MG: 800 TABLET, FILM COATED ORAL at 18:47

## 2019-09-02 NOTE — PROCEDURES
[facility] [place]  [phone]    Vitals   Pre   Post   Assessment   Pre   Post     Temp  Temp: 98.1 °F (36.7 °C) (09/02/19 0932)   LOC  A&Ox4 pleasant   HR   Pulse (Heart Rate): (!) 59 (09/02/19 1500) 64 Lungs   Clear, RA Remains on RA   B/P   BP: 131/66 (09/02/19 1500) 126/74   Cardiac   S1S2, RRR bradycardic    Resp   Resp Rate: 16 (09/02/19 1440) 16 Skin   Warm, dry, intact No change   Pain level  Pain Intensity 1: 0 (09/02/19 0952)  Edema  None   None   Orders:    Duration:   Start:    1440 End:    1740 Total:   3 hrs   Dialyzer:   Dialyzer/Set Up Inspection: Vince Larsen (08/31/19 1410)   K Bath:   Dialysate K (mEq/L): 2 (08/31/19 1410)   Ca Bath:   Dialysate CA (mEq/L): 3.0 (08/31/19 1410)   Na/Bicarb:   Dialysate NA (mEq/L): 140 (08/31/19 1410)   Target Fluid Removal:   Goal/Amount of Fluid to Remove (mL): 0 mL (08/31/19 1410)   Access     Type & Location:   [facility] AV   Labs     Obtained/Reviewed   Critical Results Called   Date when labs were drawn-  Hgb-    HGB   Date Value Ref Range Status   08/28/2019 9.7 (L) 12.1 - 17.0 g/dL Final     K-    Potassium   Date Value Ref Range Status   09/01/2019 4.0 3.5 - 5.1 mmol/L Final     Ca-   Calcium   Date Value Ref Range Status   09/01/2019 7.2 (L) 8.5 - 10.1 MG/DL Final     Bun-   BUN   Date Value Ref Range Status   09/01/2019 46 (H) 6 - 20 MG/DL Final     Comment:     INVESTIGATED PER DELTA CHECK PROTOCOL     Creat-   Creatinine   Date Value Ref Range Status   09/01/2019 7.01 (H) 0.70 - 1.30 MG/DL Final      Medications/ Blood Products Given     Name   Dose   Route and Time     None given                Blood Volume Processed (BVP):     Net Fluid   Removed:  0   Comments   Time Out Done: 1430  Primary Nurse Rpt Pre: MANPREET Baeza RN  Primary Nurse Rpt Post: MANPREET Baeza RN  Pt Education: Access precautions  Care Plan:   Tx Summary:   1430: Safety checks complete. Timeout performed.   1440: ADONIS AVG assessed, no redness, warmth or drainage noted. +bruit/thrill. Skin prepped per procedure using alcohol x 60 sec each site. Cannulated with 15g needles each site and secured with tape. +flash/aspiration/flush. HD treatment initiated. Access visible and lines secure. Medications reviewed. 1740: HD treatment complete. All possible blood rinsed back to patient. De-cannulated and pressure held until hemostasis achieved. Dressing applied. VSS. SBAR called to primary RN.   Admitting Diagnosis: ESRD  Pt's previous clinic: no HD since February 2019  Informed Consent Verified: Yes (08/31/19 1410)  Rocioita Consent: verified  Hepatitis Status: HBsAg: Neg (08/30/19) obtained from [facility] Number: [id number] (08/31/19 1410)

## 2019-09-02 NOTE — PROGRESS NOTES
Spiritual Care Assessment/Progress Note  [facility]      NAME: Asim Bishop      MRN: [id number]  AGE: 36 y.o.  SEX: male  Latter-day Affiliation: No Uatsdin   Language: English     9/2/2019     Total Time (in minutes): 25     Spiritual Assessment begun in [facility] through conversation with:         [x]Patient        [] Family    [] Friend(s)        Reason for Consult: Initial/Spiritual assessment, patient floor     Spiritual beliefs: (Please include comment if needed)     [x] Identifies with a viola tradition:         [x] Supported by a viola community:            [] Claims no spiritual orientation:           [] Seeking spiritual identity:                [] Adheres to an individual form of spirituality:           [] Not able to assess:                           Identified resources for coping:      [x] Prayer                               [x] Music                  [] Guided Imagery     [x] Family/friends                 [] Pet visits     [] Devotional reading                         [] Unknown     [] Other:                                             Interventions offered during this visit: (See comments for more details)    Patient Interventions: Affirmation of viola, Affirmation of emotions/emotional suffering, Coping skills reviewed/reinforced, Iconic (affirming the presence of God/Higher Power), Normalization of emotional/spiritual concerns, Prayer (actual), Prayer (assurance of), Latter-day beliefs/image of God discussed           Plan of Care:     [x] Support spiritual and/or cultural needs    [] Support AMD and/or advance care planning process      [] Support grieving process   [] Coordinate Rites and/or Rituals    [] Coordination with community clergy   [] No spiritual needs identified at this time   [] Detailed Plan of Care below (See Comments)  [] Make referral to Music Therapy  [] Make referral to Pet Therapy     [] Make referral to Addiction services  [] Make referral to [facility] Passages  [] Make referral to Spiritual Care Partner  [] No future visits requested        [x] Follow up visits as needed     Comments: Initial spiritual assessment in 621. 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 Pt is a . Pt stated that he taught himself to play. Pt shared events that lead up to this hospital visit. Pt stated that he has a new prospective on his situation. Pt states that he is willing to do whatever it takes to stay well for his children. \"It's not about me anymore\" he stated.  provided compassionate listening as pt shared his story. Visit ended when pt got a call from the same person a few times. Cheyenne Pugh Advised of  Availability.  will follow up as needed.     Sherita Ang,  Intern, MDiv  [phone] ([phone])

## 2019-09-02 NOTE — PROGRESS NOTES
Hospitalist Progress Note  Paulina Lucero MD  Answering service: [phone] from in house phone      Date of Service:  2019  NAME:  Helena Curtis                                                         :  1985                                               MRN:  [id number]      Subjective/interval history    -Seen during  Dialysis. Doing fine. No complaints.  -He is grateful about the dialysis     Assessment and plan  Acute  pyelonephritis,significant h/o VUR  -Switch ceftriaxone to augmenitn. Urine cx,negative. Contineu abx 7-10 days.  -Renal US:Renal cortical thinning bilaterally with severe hydronephrosis bilaterally,the bilateral hydronephrosis is chronic. He has seen by urologist in the past who recommended to continue dialysis as he is already in ESRD. 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 Advance diet as tolerated. lipase trending down     ESRD,not on HD. Metabolci acidosis. He has right arm AVF. Left arm access thrombosed. History of non-adherence  -patient claims his previous behavior was driven by fear and he now is committed to follow direction and wants to try a different nephrology group. -HD on   and      Hypocalcemia:received  Iv nico gluconate      DVT ppx: scd  Code status: full  Disposition: anticipate home. He needs dialysis chair prior to discharge. Current facility administered and prior to admit medications reviewed. x         Review of Systems:  A comprehensive review of systems was negative except for that written in the HPI. PHYSICAL EXAM:  O:  Visit Vitals  /67   Pulse (!) 56   Temp 98.1 °F (36.7 °C)   Resp 16   Ht 5' 5\" (1.651 m)   Wt 65.8 kg (145 lb)   SpO2 99%   BMI 24.13 kg/m²       General Appears comfortable,not in distress. HEENT Head atraumatic. Unicteric sclera. Moist buccal mucosa. PERRLA     CVS RRR, no MRG, no JVD, no peripheral edema       Chest No deformity  No accessory muscle use  Vesicular air entry symmetrically  No wheezing,ronchi or crepitations       Abdomen &Pelvis Not distended. Normoactive. Soft. Non tender. No hepatomegally       Genitourinary  No CVA or suprapubic tenderness       Musculoskeletal Left thrombosed avf  Right arm AVF +thrill       Skin No erythema,rash,depigmentation       Neurology Mental status: alert and oriented x4  Cranial nerves: CN 2-12 intact. Motor 5/5 through out     Psychiatry            Intake/Output Summary (Last 24 hours) at 9/2/2019 1614  Last data filed at 9/2/2019 1415  Gross per 24 hour   Intake 1750 ml   Output 1575 ml   Net 175 ml          Recent labs & imaging reviewed:    Problem List as of 9/2/2019 Date Reviewed: 9/21/2018          Codes Class Noted - Resolved    Acute on chronic renal failure [facility]) ICD-10-CM: N17.9, N18.9  ICD-9-CM: 584.9, 585.9 Present on Admission 8/11/2015 - Present        Crohn disease ([facility] ICD-10-CM: K50.90  ICD-9-CM: 730. 9 Chronic 8/11/2015 - Present        Vesicoureteral reflux with nephropathy (Chronic) ICD-10-CM: N13.729  ICD-9-CM: 593.73  7/30/2015 - Present        Uremia ICD-10-CM: N19  ICD-9-CM: 586  8/28/2019 - Present        Elevated lipase ICD-10-CM: R74.8  ICD-9-CM: 790.5  8/28/2019 - Present        * (Principal) UTI (urinary tract infection) ICD-10-CM: N39.0  ICD-9-CM: 599.0  8/28/2019 - Present        Thrombosis of arteriovenous dialysis fistula (HCC) ICD-10-CM: R79.489I  ICD-9-CM: 996.73  10/23/2015 - Present        Acute internal jugular vein thrombosis (HCC) ICD-10-CM: K91.E22  ICD-9-CM: 453.86  8/18/2015 - Present        ESRF (end stage renal failure) ([facility] ICD-10-CM: N18.6  ICD-9-CM: 585.6  7/30/2015 - Present    Overview Signed 7/30/2015 12:08 PM by Cari David MD     Has been on and off dialysis.   Not on it at the time of admission due to non-compliance             Hydroureteronephrosis ICD-10-CM: N13.30  ICD-9-CM: 591  8/31/2013 - Present        ESRD on dialysis [facility]) ICD-10-CM: N18.6, Z99.2  ICD-9-CM: 585.6, V45.11  8/31/2013 - Present                García Fabian MD  Internal Medicine  Date of Service: 9/2/2019

## 2019-09-02 NOTE — PROGRESS NOTES
Name: Byron Lockhart MRN: [id number]   : 1985 [facility]   Date: 2019        IMPRESSION:   · ESRD on HD. Patient scheduled for 3rd treatment today  · H/O non compliance with HD Txs. Patient is discharged from his home unit. · Anemia in CKD - with iron deficiency  · HTN  · Secondary Hyperparathyroidism      PLAN:   Dialysis again today (). D/C bicarb tabs  Add oral iron. Start Ergocalciferol ---> 50,000 units orally once per week x 8 weeks initially (for the Vitamin D deficiency). Check the PTH level.  is looking for permanent placement in an outpatient dialysis clinic. Mr. Fabián Colunga was educated about the importance of compliance with the dialysis prescription. It could adversely affect his eligibility for renal transplantation (in the future). The following outpatient HD units could be considered for placement :     [address] [place] [address] HD ( # [phone])  [address] [place] [address] HD ( # [phone])  [facility] HD ( # [phone])            Subjective/Interval History:     F/U ESRD - 19       Felt better with the resumption of HD . The ROS was essentially unremarkable. Mr. Fabián Colunag seemed to express a new willingness to adhere to his dialysis prescription.           Objective:   Vital Signs:    Visit Vitals  /83 (BP 1 Location: Left arm, BP Patient Position: At rest)   Pulse 75   Temp 98.1 °F (36.7 °C)   Resp 18   Ht 5' 5\" (1.651 m)   Wt 65.8 kg (145 lb)   SpO2 99%   BMI 24.13 kg/m²       O2 Device: Room air       Temp (24hrs), Av.5 °F (36.9 °C), Min:98 °F (36.7 °C), Max:99 °F (37.2 °C)       Intake/Output:   Last shift:      701 -  1900  In: -   Out: 425 [Urine:425]  Last 3 shifts: 1901 -  0700  In: -   Out: 1200 [Urine:1200]    Intake/Output Summary (Last 24 hours) at 2019 0949  Last data filed at 2019 0802  Gross per 24 hour   Intake    Output 1625 ml   Net -1625 ml        Seen in Room 621 this morning. Physical Exam:    General:    Lying in bed comfortably. Head:   Normocephalic    Eyes:   No icterus. Lungs:   Clear to auscultation, No Wheezing , Rhonchi or  Rales. Heart:   No S 3 gallop , No pericardial rub. Abdomen:   Not distended. Extremities: No leg edema. R. Arm ---> AV graft --> Bruit - present. Psych:  Calm    Neurologic: Alert and responding appropriately. DATA:  Labs:  Recent Labs     09/01/19  0342 08/31/19  0203    138   K 4.0 4.0    107   CO2 28 21   BUN 46* 70*   CREA 7.01* 9.01*   CA 7.2* 6.7*     No results for input(s): WBC, HGB, HCT, PLT, HGBEXT, HCTEXT, PLTEXT, HGBEXT, HCTEXT, PLTEXT in the last 72 hours. No results for input(s): UMM, KU, CLU, CREAU in the last 72 hours.     No lab exists for component: [address] [place] [address] discussed with:     Mr. Sim [facility] Team.    Junior Doris MD

## 2019-09-03 LAB
ALBUMIN SERPL-MCNC: 3 G/DL (ref 3.5–5)
ALBUMIN/GLOB SERPL: 0.6 {RATIO} (ref 1.1–2.2)
ALP SERPL-CCNC: 97 U/L (ref 45–117)
ALT SERPL-CCNC: 12 U/L (ref 12–78)
ANION GAP SERPL CALC-SCNC: 7 MMOL/L (ref 5–15)
AST SERPL-CCNC: 16 U/L (ref 15–37)
BILIRUB SERPL-MCNC: 0.4 MG/DL (ref 0.2–1)
BUN SERPL-MCNC: 38 MG/DL (ref 6–20)
BUN/CREAT SERPL: 6 (ref 12–20)
CALCIUM SERPL-MCNC: 7.4 MG/DL (ref 8.5–10.1)
CHLORIDE SERPL-SCNC: 102 MMOL/L (ref 97–108)
CO2 SERPL-SCNC: 30 MMOL/L (ref 21–32)
CREAT SERPL-MCNC: 6.54 MG/DL (ref 0.7–1.3)
GLOBULIN SER CALC-MCNC: 4.7 G/DL (ref 2–4)
GLUCOSE SERPL-MCNC: 91 MG/DL (ref 65–100)
LIPASE SERPL-CCNC: 490 U/L (ref 73–393)
POTASSIUM SERPL-SCNC: 4.3 MMOL/L (ref 3.5–5.1)
PROT SERPL-MCNC: 7.7 G/DL (ref 6.4–8.2)
SODIUM SERPL-SCNC: 139 MMOL/L (ref 136–145)

## 2019-09-03 PROCEDURE — 80053 COMPREHEN METABOLIC PANEL: CPT

## 2019-09-03 PROCEDURE — 83690 ASSAY OF LIPASE: CPT

## 2019-09-03 PROCEDURE — 74011250637 HC RX REV CODE- 250/637: Performed by: INTERNAL MEDICINE

## 2019-09-03 PROCEDURE — 74011250637 HC RX REV CODE- 250/637: Performed by: HOSPITALIST

## 2019-09-03 PROCEDURE — 36415 COLL VENOUS BLD VENIPUNCTURE: CPT

## 2019-09-03 PROCEDURE — 65270000029 HC RM PRIVATE

## 2019-09-03 PROCEDURE — 86704 HEP B CORE ANTIBODY TOTAL: CPT

## 2019-09-03 RX ADMIN — Medication 10 ML: at 14:47

## 2019-09-03 RX ADMIN — SEVELAMER CARBONATE 800 MG: 800 TABLET, FILM COATED ORAL at 06:59

## 2019-09-03 RX ADMIN — ASCORBIC ACID, THIAMINE MONONITRATE,RIBOFLAVIN, NIACINAMIDE, PYRIDOXINE HYDROCHLORIDE, FOLIC ACID, CYANOCOBALAMIN, BIOTIN, CALCIUM PANTOTHENATE, 1 CAPSULE: 100; 1.5; 1.7; 20; 10; 1; 6000; 150000; 5 CAPSULE, LIQUID FILLED ORAL at 09:24

## 2019-09-03 RX ADMIN — SEVELAMER CARBONATE 800 MG: 800 TABLET, FILM COATED ORAL at 12:55

## 2019-09-03 RX ADMIN — Medication 10 ML: at 07:00

## 2019-09-03 RX ADMIN — Medication 1 TABLET: at 09:24

## 2019-09-03 RX ADMIN — AMOXICILLIN AND CLAVULANATE POTASSIUM 1 TABLET: 500; 125 TABLET, FILM COATED ORAL at 14:47

## 2019-09-03 RX ADMIN — SEVELAMER CARBONATE 800 MG: 800 TABLET, FILM COATED ORAL at 18:01

## 2019-09-03 RX ADMIN — Medication 10 ML: at 21:29

## 2019-09-03 NOTE — PROGRESS NOTES
Name: Kaz Kingston MRN: [id number]   : 1985 Hospital: [address]   Date: 9/3/2019        IMPRESSION:   · ESRD on HD. Patient scheduled for 3rd treatment today  · H/O non compliance with HD Txs. Patient is discharged from his home unit. · Anemia in CKD - with iron deficiency  · HTN  · Secondary Hyperparathyroidism - at target for Stage 6 CKD  · Hypocalcemia - Mild      PLAN:   Dialysis again tomorrow (). Adjust the dialysate calcium to 3.0 . Continue oral iron. Continue Ergocalciferol ---> 50,000 units orally once per week x 8 weeks initially (for the Vitamin D deficiency). Hold off calcitriol / hectorol / zemplar for now     is looking for permanent placement in an outpatient dialysis clinic. Mr. Yaritza Bravo was educated about the importance of compliance with the dialysis prescription. It could adversely affect his eligibility for renal transplantation (in the future). The following outpatient HD units could be considered for placement : 1. [address] HD ( # [phone])  2. [facility] HD ( # [phone])  [facility] ( # [phone])            Subjective/Interval History:     F/U ESRD - 19       Felt better with the resumption of HD . The ROS was essentially unremarkable. Mr. Yaritza Bravo seemed to express a new willingness to adhere to his dialysis prescription. F/U ESRD - 9/3/19       There were no reported problems with HD yesterday. His urine output is stable.     Objective:   Vital Signs:    Visit Vitals  /63 (BP 1 Location: Left arm, BP Patient Position: At rest)   Pulse 73   Temp 98.3 °F (36.8 °C)   Resp 18   Ht 5' 5\" (1.651 m)   Wt 66.2 kg (145 lb 14.4 oz)   SpO2 99%   BMI 24.28 kg/m²       O2 Device: Room air       Temp (24hrs), Av.6 °F (37 °C), Min:98.3 °F (36.8 °C), Max:98.9 °F (37.2 °C)       Intake/Output:   Last shift:       0701 - 09/03 1900  In: -   Out: 9052 [Urine:1750]  Last 3 shifts: 1901 - 09/03 0700  In: 1750 [P.O.:1750]  Out: 1575 [Urine:1575]    Intake/Output Summary (Last 24 hours) at 9/3/2019 1050  Last data filed at 9/3/2019 0703  Gross per 24 hour   Intake 1000 ml   Output 2200 ml   Net -1200 ml        Seen in Room 621 this morning. Physical Exam:    General:    Lying in bed comfortably. Head:   Normocephalic    Eyes:   No icterus. Lungs:   Clear to auscultation, No Wheezing , Rhonchi or  Rales. Heart:   No S 3 gallop , No pericardial rub. Abdomen:   Not distended. Extremities: No leg edema. R. Arm ---> AV graft --> Bruit - present      Neurologic: Responding appropriately. DATA:  Labs:  Recent Labs     09/03/19  0227 09/02/19  1448 09/01/19  0342     --  139   K 4.3  --  4.0     --  103   CO2 30  --  28   BUN 38*  --  46*   CREA 6.54*  --  7.01*   CA 7.4* 6.8* 7.2*   ALB 3.0*  --   --      No results for input(s): WBC, HGB, HCT, PLT, HGBEXT, HCTEXT, PLTEXT, HGBEXT, HCTEXT, PLTEXT in the last 72 hours. No results for input(s): UMM, KU, CLU, CREAU in the last 72 hours.     No lab exists for component: [address] [place] [address] discussed with:     Mr. Claudene Mirza, Dr. Roark Hough, MD

## 2019-09-03 NOTE — PROGRESS NOTES
Hospitalist Progress Note  Domingo King MD  Answering service: [phone] from in house phone      Date of Service:  9/3/2019  NAME:  Grisel Al                                                         :  1985                                               MRN:  [id number]      Subjective/interval history    -Awaiting dialysis chair. Assessment and plan  Acute  pyelonephritis,significant h/o VUR  -Switch ceftriaxone to augmenitn. Urine cx,negative. Contineu abx 7-10 days.  -Renal US:Renal cortical thinning bilaterally with severe hydronephrosis bilaterally,the bilateral hydronephrosis is chronic. He has seen by urologist in the past who recommended to continue dialysis as he is already in ESRD. 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 Advance diet as tolerated. lipase trending down     ESRD,not on HD. Metabolci acidosis. He has right arm AVF. Left arm access thrombosed. History of non-adherence  -patient claims his previous behavior was driven by fear and he now is committed to follow direction and wants to try a different nephrology group. -HD on   and      Hypocalcemia:received  Iv nico gluconate      DVT ppx: scd  Code status: full  Disposition: anticipate home. He needs dialysis chair prior to discharge. Current facility administered and prior to admit medications reviewed. x         Review of Systems:  A comprehensive review of systems was negative except for that written in the HPI. PHYSICAL EXAM:  O:  Visit Vitals  /63 (BP 1 Location: Left arm, BP Patient Position: At rest)   Pulse 73   Temp 98.3 °F (36.8 °C)   Resp 18   Ht 5' 5\" (1.651 m)   Wt 66.2 kg (145 lb 14.4 oz)   SpO2 99%   BMI 24.28 kg/m²       General Appears comfortable,not in distress. HEENT Head atraumatic. Unicteric sclera. Moist buccal mucosa. PERRLA     CVS RRR, no MRG, no JVD, no peripheral edema       Chest No deformity  No accessory muscle use  Vesicular air entry symmetrically  No wheezing,ronchi or crepitations       Abdomen &Pelvis Not distended. Normoactive. Soft. Non tender. No hepatomegally       Genitourinary  No CVA or suprapubic tenderness       Musculoskeletal Left thrombosed avf  Right arm AVF +thrill       Skin No erythema,rash,depigmentation       Neurology Mental status: alert and oriented x4  Cranial nerves: CN 2-12 intact. Motor 5/5 through out     Psychiatry            Intake/Output Summary (Last 24 hours) at 9/3/2019 1244  Last data filed at 9/3/2019 0703  Gross per 24 hour   Intake 1000 ml   Output 2200 ml   Net -1200 ml          Recent labs & imaging reviewed:    Problem List as of 9/3/2019 Date Reviewed: 9/21/2018          Codes Class Noted - Resolved    Acute on chronic renal failure [facility]) ICD-10-CM: N17.9, N18.9  ICD-9-CM: 584.9, 585.9 Present on Admission 8/11/2015 - Present        Crohn disease ([facility] ICD-10-CM: K50.90  ICD-9-CM: 404. 9 Chronic 8/11/2015 - Present        Vesicoureteral reflux with nephropathy (Chronic) ICD-10-CM: N13.729  ICD-9-CM: 593.73  7/30/2015 - Present        Uremia ICD-10-CM: N19  ICD-9-CM: 586  8/28/2019 - Present        Elevated lipase ICD-10-CM: R74.8  ICD-9-CM: 790.5  8/28/2019 - Present        * (Principal) UTI (urinary tract infection) ICD-10-CM: N39.0  ICD-9-CM: 599.0  8/28/2019 - Present        Thrombosis of arteriovenous dialysis fistula (HCC) ICD-10-CM: D82.081V  ICD-9-CM: 996.73  10/23/2015 - Present        Acute internal jugular vein thrombosis (HCC) ICD-10-CM: G67.H43  ICD-9-CM: 453.86  8/18/2015 - Present        ESRF (end stage renal failure) ([facility] ICD-10-CM: N18.6  ICD-9-CM: 585.6  7/30/2015 - Present    Overview Signed 7/30/2015 12:08 PM by Wild Magana MD     Has been on and off dialysis.   Not on it at the time of admission due to non-compliance             Hydroureteronephrosis ICD-10-CM: N13.30  ICD-9-CM: 591  8/31/2013 - Present        ESRD on dialysis [facility]) ICD-10-CM: N18.6, Z99.2  ICD-9-CM: 585.6, V45.11  8/31/2013 - Present                Vladimir Jaime MD  Internal Medicine  Date of Service: 9/3/2019

## 2019-09-03 NOTE — PROGRESS NOTES
REJI; discharge home when out pt HD arranged    CM met with pt to obtain information requested by Aria Sheehan in [employer]. Pt is independent and self care. He drives and has transportation. He does not have a trach. He does not use DME. He prefers the early morning shifts due his work schedule but will accept any that is available. Preference area is [employer], either [facility], [address] [address] [employer], or [employer]. Labs, cxr, Hep panel, Hep core need to be sent when results available.      Gamaliel Rod RN ACM CRM

## 2019-09-04 LAB
BASOPHILS # BLD: 0 K/UL (ref 0–0.1)
BASOPHILS NFR BLD: 1 % (ref 0–1)
DIFFERENTIAL METHOD BLD: ABNORMAL
EOSINOPHIL # BLD: 0.3 K/UL (ref 0–0.4)
EOSINOPHIL NFR BLD: 5 % (ref 0–7)
ERYTHROCYTE [DISTWIDTH] IN BLOOD BY AUTOMATED COUNT: 13.7 % (ref 11.5–14.5)
HBV CORE AB SERPL QL IA: NEGATIVE
HBV CORE IGM SERPL QL IA: NEGATIVE
HCT VFR BLD AUTO: 31.5 % (ref 36.6–50.3)
HGB BLD-MCNC: 9.6 G/DL (ref 12.1–17)
IMM GRANULOCYTES # BLD AUTO: 0 K/UL (ref 0–0.04)
IMM GRANULOCYTES NFR BLD AUTO: 0 % (ref 0–0.5)
LIPASE SERPL-CCNC: 687 U/L (ref 73–393)
LYMPHOCYTES # BLD: 1.5 K/UL (ref 0.8–3.5)
LYMPHOCYTES NFR BLD: 26 % (ref 12–49)
MCH RBC QN AUTO: 28.3 PG (ref 26–34)
MCHC RBC AUTO-ENTMCNC: 30.5 G/DL (ref 30–36.5)
MCV RBC AUTO: 92.9 FL (ref 80–99)
MONOCYTES # BLD: 0.7 K/UL (ref 0–1)
MONOCYTES NFR BLD: 13 % (ref 5–13)
NEUTS SEG # BLD: 3.3 K/UL (ref 1.8–8)
NEUTS SEG NFR BLD: 55 % (ref 32–75)
NRBC # BLD: 0 K/UL (ref 0–0.01)
NRBC BLD-RTO: 0 PER 100 WBC
PLATELET # BLD AUTO: 144 K/UL (ref 150–400)
PMV BLD AUTO: 10.2 FL (ref 8.9–12.9)
RBC # BLD AUTO: 3.39 M/UL (ref 4.1–5.7)
WBC # BLD AUTO: 5.8 K/UL (ref 4.1–11.1)

## 2019-09-04 PROCEDURE — 83690 ASSAY OF LIPASE: CPT

## 2019-09-04 PROCEDURE — 74011250637 HC RX REV CODE- 250/637: Performed by: HOSPITALIST

## 2019-09-04 PROCEDURE — 90935 HEMODIALYSIS ONE EVALUATION: CPT

## 2019-09-04 PROCEDURE — 74011250636 HC RX REV CODE- 250/636: Performed by: INTERNAL MEDICINE

## 2019-09-04 PROCEDURE — 65270000029 HC RM PRIVATE

## 2019-09-04 PROCEDURE — 36415 COLL VENOUS BLD VENIPUNCTURE: CPT

## 2019-09-04 PROCEDURE — 85025 COMPLETE CBC W/AUTO DIFF WBC: CPT

## 2019-09-04 PROCEDURE — 74011250637 HC RX REV CODE- 250/637: Performed by: INTERNAL MEDICINE

## 2019-09-04 RX ADMIN — AMOXICILLIN AND CLAVULANATE POTASSIUM 1 TABLET: 500; 125 TABLET, FILM COATED ORAL at 19:09

## 2019-09-04 RX ADMIN — Medication 1 TABLET: at 08:57

## 2019-09-04 RX ADMIN — SEVELAMER CARBONATE 800 MG: 800 TABLET, FILM COATED ORAL at 19:09

## 2019-09-04 RX ADMIN — SEVELAMER CARBONATE 800 MG: 800 TABLET, FILM COATED ORAL at 11:30

## 2019-09-04 RX ADMIN — Medication 10 ML: at 07:05

## 2019-09-04 RX ADMIN — Medication 10 ML: at 21:58

## 2019-09-04 RX ADMIN — ASCORBIC ACID, THIAMINE MONONITRATE,RIBOFLAVIN, NIACINAMIDE, PYRIDOXINE HYDROCHLORIDE, FOLIC ACID, CYANOCOBALAMIN, BIOTIN, CALCIUM PANTOTHENATE, 1 CAPSULE: 100; 1.5; 1.7; 20; 10; 1; 6000; 150000; 5 CAPSULE, LIQUID FILLED ORAL at 08:57

## 2019-09-04 RX ADMIN — SEVELAMER CARBONATE 800 MG: 800 TABLET, FILM COATED ORAL at 07:05

## 2019-09-04 RX ADMIN — EPOETIN ALFA-EPBX 4000 UNITS: 4000 INJECTION, SOLUTION INTRAVENOUS; SUBCUTANEOUS at 21:57

## 2019-09-04 NOTE — PROGRESS NOTES
Pt IV leaking with flush. No pain. Catheter removed, tip intact. Covered in a band-aid. No bleeding noted. Patient request no other IV if not needed. He does not have any IV medications. Coordinator nurse made aware.

## 2019-09-04 NOTE — DIALYSIS
TRANSFER - IN REPORT:    Verbal report received from Idris Villasenor RN on Clovia Passer  being received from room 621 for ordered procedure      Report consisted of patients Situation, Background, Assessment and   Recommendations(SBAR). Information from the following report(s) SBAR was reviewed with the receiving nurse. Opportunity for questions and clarification was provided. Assessment completed upon patients arrival to unit and care assumed.

## 2019-09-04 NOTE — DIALYSIS
HD TRANSFER - OUT REPORT:    Verbal report given to Sidney on Mukund Farmer being transferred to room 96 [id number] for routine progression of care       Report consisted of patient's Situation, Background, Assessment and   Recommendations(SBAR). Information from the following report(s) Procedure Summary was reviewed with the receiving nurse. Method:  $$ Method: Hemodialysis (09/04/19 1515)    Fluid Removed  NET Fluid Removed (mL): 0 ml (09/04/19 1815)     Patient response to treatment:  Stable    End Time  Hemodialysis End Time: 1815 (09/04/19 1815)  If not documented, dialysis nurse to update post-dialysis row in HD/Filtration flowsheet     Medications /Volume expansion agents or Fluid boluses administered during treatment? no    Post-dialysis medication administration due?  no  Remind nurse to administer post-HD medication upon return to unit. Fistula hemostasis? yes      Opportunity for questions and clarification was provided.       Patient transported with: [employer]

## 2019-09-04 NOTE — DIALYSIS
[facility] Dialysis Team [facility] Acutes  [phone]    Vitals   Pre   Post   Assessment   Pre   Post     Temp  Temp: 99.2 °F (37.3 °C) (09/04/19 1515)  98.8 LOC  Alert and oriented  Alert and oriented   HR   Pulse (Heart Rate): 63 (09/04/19 1515) 16 Lungs   Clear   clear    B/P   BP: 110/68 (09/04/19 1515) 126/73 Cardiac   Regular  regular    Resp   Resp Rate: 16 (09/04/19 1515) 16 Skin   Warm and dry   warm and dry    Pain level  Pain Intensity 1: 0 (09/03/19 1450) No verbal complaints Edema    none   none   Orders:    Duration:   Start:    1515 End:    1815 Total:   3   Dialyzer:   Dialyzer/Set Up Inspection: Sobeida Kwon (09/04/19 1515)   K Bath:   Dialysate K (mEq/L): 2 (09/04/19 1515)   Ca Bath:   Dialysate CA (mEq/L): 3.0 (09/04/19 1515)   Na/Bicarb:   Dialysate NA (mEq/L): 140 (09/04/19 1515)   Target Fluid Removal:   Goal/Amount of Fluid to Remove (mL): 0 mL (09/04/19 1515)   Access     Type & Location:   clovis gortex graft, site without redness or edema, + thrill/bruit, prepped with alcohol arterial cannulated with a 15 gauge needle without  Incident, venous cannulated with 15 gauge difficulty advancing, pulled and recannulated with a 16 gauge needles without incident. Secured with tape and flushed.    Labs     Obtained/Reviewed   Critical Results Called   Date when labs were drawn-  Hgb-    HGB   Date Value Ref Range Status   09/04/2019 9.6 (L) 12.1 - 17.0 g/dL Final     K-    Potassium   Date Value Ref Range Status   09/03/2019 4.3 3.5 - 5.1 mmol/L Final     Ca-   Calcium   Date Value Ref Range Status   09/03/2019 7.4 (L) 8.5 - 10.1 MG/DL Final     Bun-   BUN   Date Value Ref Range Status   09/03/2019 38 (H) 6 - 20 MG/DL Final     Creat-   Creatinine   Date Value Ref Range Status   09/03/2019 6.54 (H) 0.70 - 1.30 MG/DL Final        Medications/ Blood Products Given     Name   Dose   Route and Time           none          Blood Volume Processed (BVP):    51 Net Fluid   Removed:  0   Comments   Time Out Done:  5523  Primary Nurse Rpt Pre: Rc Zhao RN   Primary Nurse Rpt PostArcchristine Henry RN   Pt Education: infection control remove dressing 12-24 hours after dialysis   Care Plan: continue current HD plan of care   Tx Summary:  2895 HD initiated, patient states he voids and does not pull fluid. No edema, lungs clear. 1815 all possible blood returned, hemostasis achieved <10 minutes, dressing dry and intact. SBAR to primary nurse. Admiting Diagnosis: ESRD  Pt's previous clinic- to be admitted to [facility] according to the patient  Consent signed - Informed Consent Verified: Yes (09/04/19 1515)  [facility] Consent - verified   Hepatitis Status- Negative antigen 08/31/19 connect care  Machine #- Machine Number: [id number] (09/04/19 1515)  Telemetry status-n/a  Pre-dialysis wt. -  67.1 kg

## 2019-09-04 NOTE — PROGRESS NOTES
Name: Carmen Leonard MRN: [id number]   : 1985 Hospital: [facility]   Date: 2019        IMPRESSION:   · ESRD on HD. Patient scheduled for 3rd treatment today  · H/O non compliance with HD Txs. Patient is discharged from his home unit. · Anemia in CKD - with iron deficiency  · HTN  · Secondary Hyperparathyroidism - at target for Stage 6 CKD  · Hypocalcemia - Mild      PLAN:   Dialysis again today (). Continue oral iron. Add EPO. Continue Ergocalciferol ---> 50,000 units orally once per week x 8 weeks initially (for the Vitamin D deficiency). Hold off calcitriol / hectorol / zemplar for now     is looking for permanent placement in an outpatient dialysis clinic. Mr. Ruth Khan was educated about the importance of compliance with the dialysis prescription. It could adversely affect his eligibility for renal transplantation (in the future). The following outpatient HD units could be considered for placement : 1. [address] HD ( # [phone])  2. [facility] HD ( # [phone])  3. [facility] HD ( # [phone])            Subjective/Interval History:     F/U ESRD - 19       Felt better with the resumption of HD . The ROS was essentially unremarkable. Mr. Ruth Khan seemed to express a new willingness to adhere to his dialysis prescription. F/U ESRD - 9/3/19       There were no reported problems with HD yesterday. His urine output is stable. F/U ESRD - 19      No new complaints were offered. The ROS was unremarkable.       Objective:   Vital Signs:    Visit Vitals  /65 (BP 1 Location: Left arm, BP Patient Position: At rest)   Pulse 78   Temp 97.9 °F (36.6 °C)   Resp 16   Ht 5' 5\" (1.651 m)   Wt 67.1 kg (147 lb 14.4 oz)   SpO2 100%   BMI 24.61 kg/m²       O2 Device: Room air       Temp (24hrs), Av.1 °F (36.7 °C), Min:97.9 °F (36.6 °C), Max:98.2 °F (36.8 °C)       Intake/Output:   Last shift:      701 -  1900  In: 240 [P.O.:240]  Out: 200 [Urine:200]  Last 3 shifts: 09/02 1901 - 09/04 0700  In: -   Out: 2850 [Urine:2850]    Intake/Output Summary (Last 24 hours) at 9/4/2019 1117  Last data filed at 9/4/2019 0858  Gross per 24 hour   Intake 240 ml   Output 1300 ml   Net -1060 ml        Seen in Room 621 g. Physical Exam:      General:    Lying in bed comfortably. Head:   Normocephalic    Eyes:   No icterus. Lungs:   Clear to auscultation, No Wheezing , Rhonchi or  Rales. Heart:   No S 3 gallop , No pericardial rub. Abdomen:   Not distended. Extremities:  R. Arm ---> AV graft --> Bruit - present      Neurologic: Responding appropriately. DATA:  Labs:  Recent Labs     09/03/19  0227 09/02/19  1448     --    K 4.3  --      --    CO2 30  --    BUN 38*  --    CREA 6.54*  --    CA 7.4* 6.8*   ALB 3.0*  --      Recent Labs     09/04/19  0535   WBC 5.8   HGB 9.6*   HCT 31.5*   *     No results for input(s): UMM, KU, CLU, CREAU in the last 72 hours.     No lab exists for component: [address] [place] [address] discussed with:     Mr. Annetta Marroquin MD

## 2019-09-04 NOTE — PROGRESS NOTES
CM received voicemail from [facility] representative requesting chest xray, hep c, patients zip code and discharge date. CM uploaded requested information into [employer]. CM spoke with Saba Iraheta at [facility] who advised that none of the [facility] locations have available slots for HD. CM previously informed by patient that he was interested in [facility] location per documented in initial assessment. [facility] representative advised that patient has been accepted at [facility] location and if its changed the patient will have a longer wait. CM confirmed with patient that [facility] because its closer to his job per the patient plans to attend appointment either before or after work. [facility] representative advised that plan for patient can be finalized today once labs are received and location is established. CM faxed to [facility] [phone] EOR#[phone]. Patient prefers morning or afternoon shift. CM awaiting an update from [facility]. CM received update from [facility] that advised [facility] is not available for morning or afternoon shift per the patient works 5pm to close. [facility] representative advised that [facility] has noon slots available. CM confirmed with patient that he is interested in [facility] location ([address] [facility]). CM returned call to [facility] representative left message advised to have patient scheduled for appointments at [facility], CM awaiting a response.      CM will follow  PABLITO Harrison/SARWAT

## 2019-09-04 NOTE — PROGRESS NOTES
Hospitalist Progress Note  Segun Barclay MD  Answering service: [phone] from in house phone      Date of Service:  2019  NAME:  Ten Morocho                                                         :  1985                                               MRN:  [id number]      Subjective/interval history    He is accepted to [address] [place] [address] confirmation,hope to discharge this afternoon. Assessment and plan  Acute  pyelonephritis,significant h/o VUR  -Switch ceftriaxone to augmenitn. Urine cx,negative. Contineu abx 7-10 days.  -Renal US:Renal cortical thinning bilaterally with severe hydronephrosis bilaterally,the bilateral hydronephrosis is chronic. He has seen by urologist in the past who recommended to continue dialysis as he is already in ESRD. 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 Advance diet as tolerated. lipase trending down     ESRD,not on HD. Metabolci acidosis. He has right arm AVF. Left arm access thrombosed. History of non-adherence  -patient claims his previous behavior was driven by fear and he now is committed to follow direction and wants to try a different nephrology group. -HD on   and      Hypocalcemia:received  Iv nico gluconate      DVT ppx: scd  Code status: full  Disposition: anticipate home. He needs dialysis chair prior to discharge. Current facility administered and prior to admit medications reviewed. x         Review of Systems:  A comprehensive review of systems was negative except for that written in the HPI. PHYSICAL EXAM:  O:  Visit Vitals  /65 (BP 1 Location: Left arm, BP Patient Position: At rest)   Pulse 78   Temp 97.9 °F (36.6 °C)   Resp 16   Ht 5' 5\" (1.651 m)   Wt 67.1 kg (147 lb 14.4 oz)   SpO2 100%   BMI 24.61 kg/m²       General Appears comfortable,not in distress. HEENT Head atraumatic. Unicteric sclera. Moist buccal mucosa. PERRLA     CVS RRR, no MRG, no JVD, no peripheral edema       Chest No deformity  No accessory muscle use  Vesicular air entry symmetrically  No wheezing,ronchi or crepitations       Abdomen &Pelvis Not distended. Normoactive. Soft. Non tender. No hepatomegally       Genitourinary  No CVA or suprapubic tenderness       Musculoskeletal Left thrombosed avf  Right arm AVF +thrill       Skin No erythema,rash,depigmentation       Neurology Mental status: alert and oriented x4  Cranial nerves: CN 2-12 intact. Motor 5/5 through out     Psychiatry            Intake/Output Summary (Last 24 hours) at 9/4/2019 1346  Last data filed at 9/4/2019 0858  Gross per 24 hour   Intake 240 ml   Output 1300 ml   Net -1060 ml          Recent labs & imaging reviewed:    Problem List as of 9/4/2019 Date Reviewed: 9/21/2018          Codes Class Noted - Resolved    Acute on chronic renal failure [facility]) ICD-10-CM: N17.9, N18.9  ICD-9-CM: 584.9, 585.9 Present on Admission 8/11/2015 - Present        Crohn disease ([facility]) ICD-10-CM: K50.90  ICD-9-CM: 138. 9 Chronic 8/11/2015 - Present        Vesicoureteral reflux with nephropathy (Chronic) ICD-10-CM: N13.729  ICD-9-CM: 593.73  7/30/2015 - Present        Uremia ICD-10-CM: N19  ICD-9-CM: 586  8/28/2019 - Present        Elevated lipase ICD-10-CM: R74.8  ICD-9-CM: 790.5  8/28/2019 - Present        * (Principal) UTI (urinary tract infection) ICD-10-CM: N39.0  ICD-9-CM: 599.0  8/28/2019 - Present        Thrombosis of arteriovenous dialysis fistula (HCC) ICD-10-CM: L77.050Z  ICD-9-CM: 996.73  10/23/2015 - Present        Acute internal jugular vein thrombosis (HCC) ICD-10-CM: W95.S68  ICD-9-CM: 453.86  8/18/2015 - Present        ESRF (end stage renal failure) ([facility]) ICD-10-CM: N18.6  ICD-9-CM: 585.6  7/30/2015 - Present    Overview Signed 7/30/2015 12:08 PM by Arielle Alarcon MD     Has been on and off dialysis.   Not on it at the time of admission due to non-compliance             Hydroureteronephrosis ICD-10-CM: N13.30  ICD-9-CM: 699  8/31/2013 - Present        ESRD on dialysis [facility]) ICD-10-CM: N18.6, Z99.2  ICD-9-CM: 585.6, V45.11  8/31/2013 - Present                Cecelia Guerrier MD  Internal Medicine  Date of Service: 9/4/2019

## 2019-09-05 LAB — LIPASE SERPL-CCNC: 833 U/L (ref 73–393)

## 2019-09-05 PROCEDURE — 74011250637 HC RX REV CODE- 250/637: Performed by: HOSPITALIST

## 2019-09-05 PROCEDURE — 83690 ASSAY OF LIPASE: CPT

## 2019-09-05 PROCEDURE — 36415 COLL VENOUS BLD VENIPUNCTURE: CPT

## 2019-09-05 PROCEDURE — 74011250637 HC RX REV CODE- 250/637: Performed by: INTERNAL MEDICINE

## 2019-09-05 PROCEDURE — 65270000029 HC RM PRIVATE

## 2019-09-05 RX ADMIN — SEVELAMER CARBONATE 800 MG: 800 TABLET, FILM COATED ORAL at 07:11

## 2019-09-05 RX ADMIN — AMOXICILLIN AND CLAVULANATE POTASSIUM 1 TABLET: 500; 125 TABLET, FILM COATED ORAL at 14:58

## 2019-09-05 RX ADMIN — Medication 1 TABLET: at 10:45

## 2019-09-05 RX ADMIN — SEVELAMER CARBONATE 800 MG: 800 TABLET, FILM COATED ORAL at 16:53

## 2019-09-05 RX ADMIN — ASCORBIC ACID, THIAMINE MONONITRATE,RIBOFLAVIN, NIACINAMIDE, PYRIDOXINE HYDROCHLORIDE, FOLIC ACID, CYANOCOBALAMIN, BIOTIN, CALCIUM PANTOTHENATE, 1 CAPSULE: 100; 1.5; 1.7; 20; 10; 1; 6000; 150000; 5 CAPSULE, LIQUID FILLED ORAL at 09:50

## 2019-09-05 RX ADMIN — SEVELAMER CARBONATE 800 MG: 800 TABLET, FILM COATED ORAL at 12:09

## 2019-09-05 NOTE — PROGRESS NOTES
Name: Theresa Waldron MRN: [id number]   : 1985 Hospital: [address]   Date: 2019        IMPRESSION:   · ESRD on HD --> on the  MWF schedule (in the hospital). This may be switched to //S in the   · Outpatient clinic.   · H/O non compliance with HD Txs. Patient is discharged from his home unit. · Anemia in CKD - with iron deficiency  · HTN  · Secondary Hyperparathyroidism - at target for Stage 6 CKD  · Hypocalcemia - Mild      PLAN:   Dialysis again tomorrow (). Continue oral iron. Add EPO. Continue Ergocalciferol ---> 50,000 units orally once per week x 8 weeks initially (for the Vitamin D deficiency). The corrected calcium is approximately 8.2/8.3. Hold off calcitriol / hectorol / zemplar for now     is looking for permanent placement in an outpatient dialysis clinic. Mr. Zayda Choudhury was educated about the importance of compliance with the dialysis prescription. It could adversely affect his eligibility for renal transplantation (in the future). The following outpatient HD units are under consideration: 1. [facility] ( # [phone])  2. [facility] ( # [phone])  3. [facility] ( # [phone])    An opening on the second shift (//S) is available [facility]. This needs to be finalized / confirmed this afternoon. (D/W Case Management this morning). Subjective/Interval History:     F/U ESRD - 19       Felt better with the resumption of HD . The ROS was essentially unremarkable. Mr. Zayda Choudhury seemed to express a new willingness to adhere to his dialysis prescription. F/U ESRD - 9/3/19       There were no reported problems with HD yesterday. His urine output is stable. F/U ESRD - 19      No new complaints were offered. The ROS was unremarkable. F/U ESRD - 19. There were no new complaints.    Mr. Zayda Choudhury is anxious to get back to work.    Objective:   Vital Signs:    Visit Vitals  /86 (BP 1 Location: Left arm, BP Patient Position: Sitting)   Pulse 97   Temp 98 °F (36.7 °C)   Resp 18   Ht 5' 5\" (1.651 m)   Wt 70.7 kg (155 lb 12.8 oz)   SpO2 97%   BMI 25.93 kg/m²       O2 Device: Room air       Temp (24hrs), Av.6 °F (37 °C), Min:98 °F (36.7 °C), Max:99.2 °F (37.3 °C)       Intake/Output:   Last shift:      No intake/output data recorded. Last 3 shifts:  1901 -  0700  In: 480 [P.O.:480]  Out: 1500 [Urine:1500]    Intake/Output Summary (Last 24 hours) at 2019 1124  Last data filed at 2019 1815  Gross per 24 hour   Intake 240 ml   Output 200 ml   Net 40 ml        Seen in Room 621    Physical Exam:      General:    Lying in bed comfortably. Head:   Normocephalic    Eyes:   No icterus. Lungs:   Clear to auscultation, No Wheezing , Rhonchi or  Rales. Heart:   No S 3 gallop , No pericardial rub. Abdomen:   Not distended. Extremities:  R. Arm ---> AV graft --> Bruit - present      Neurologic: Responding appropriately. Psyche :           Calm      DATA:  Labs:  Recent Labs     19  0227 19  1448     --    K 4.3  --      --    CO2 30  --    BUN 38*  --    CREA 6.54*  --    CA 7.4* 6.8*   ALB 3.0*  --      Recent Labs     19  0535   WBC 5.8   HGB 9.6*   HCT 31.5*   *     No results for input(s): UMM, KU, CLU, CREAU in the last 72 hours.     No lab exists for component: [address] [place] [address] discussed with:     Mr. Shoaib Kenyon MD

## 2019-09-05 NOTE — PROGRESS NOTES
NUTRITION COMPLETE ASSESSMENT    RECOMMENDATIONS:   1. Continue with current diet  2. RD to add Nepro supplement for pt to try     Interventions/Plan:   Food/Nutrient Delivery:  General/healthful diet Commercial supplement        Nutrition Education:Survival information, Purpose of nutrition education    Coordination of Care: Collaboration with other providers    Assessment:   Reason for Assessment: follow-up      Diet: Renal  Supplements: RD to add Nepro when diet advanced  Nutritionally Significant Medications: Augmentin, Nephrocap, Chromagen, Renvela  Meal Intake:   Patient Vitals for the past 100 hrs:   % Diet Eaten   09/04/19 0858 100 %   09/02/19 1415 100 %   09/02/19 0952 100 %       Pre-Hospitalization:  Usual Appetite: Poor    Current Hospitalization:   Fluid Restriction:    Appetite: Good  PO Ability: Independent Average po intake:%  Average supplements intake:        Subjective:  I'm doing great, ready to go home. They've already answered all my diet questions. Objective:  Pt resumed dialysis last week. Denies any current N/V. Eating well. Receptive to trying Nepro supplement.   He is anxious to leave hospital.    Past Medical History:   Diagnosis Date    Acute renal failure (ARF) ([facility] 7/30/2015    Chronic kidney disease     pt on hemodialysis d/t reflux    Congenital hydroureteronephrosis     Crohn disease ([facility]     [facility] venous thrombosis) ([facility]          ESRD (end stage renal disease) ([facility]     on HD 4090-1472    Gastrointestinal disorder     Chron's    VUR (vesicoureteric reflux)           Cultural, Confucianist and ethnic food preferences identified:      Skin Integrity: intact  Edema: none  Last BM: 9/5  Food Allergies: shellfish  Diet Restrictions:       Anthropometrics:    Weight Loss Metrics 9/5/2019 8/28/2019 8/28/2019 8/28/2019 9/21/2018 1/3/2017 12/6/2015   Today's Wt 155 lb 12.8 oz - 155 lb 8 oz - 158 lb 11.7 oz 157 lb 3 oz 173 lb 1 oz   BMI - 25.93 kg/m2 - 22.96 kg/m2 23.44 kg/m2 25.37 kg/m2 27.95 kg/m2      Weight Source: Standing scale (comment)  Height: 5' 5\" (165.1 cm),    Body mass index is 25.93 kg/m². IBW : 61.8 kg (136 lb 3.9 oz), % IBW (Calculated): 114.08 %  Usual Body Weight: 74.8 kg (165 lb)(165-170 lb),      Labs:    Recent Labs     09/03/19  0227   GLU 91   BUN 38*   CREA 6.54*      K 4.3      CO2 30   CA 7.4*       No results for input(s): GLUCPOC in the last 72 hours. Lab Results   Component Value Date/Time    Hemoglobin A1c (POC) 5.0 08/28/2019 03:43 PM       Estimated Nutrition Needs:   Kcals/day: 2400 Kcals/day(BMR 1600 x 1.5)  Protein: 85 g(minimum (using 1.2+ gm/kg))  Fluid: 2100 ml(30 mL/kg but defer to nephrology d/t dialysis)  Based On: [place] Jeor  Weight Used: Actual wt    Pt expected to meet estimated nutrient needs:  [x]   Yes     []  No [] Unable to predict at this time  Nutrition Diagnosis:   1.  Altered nutrition-related lab values related to ESRD as evidenced by elevated BUN and Cr    2. Unintended weight loss related to poor appetite d/t non-compliance with dialysis as evidenced by pt reports eating only 1 meal in the evening and even that makes him nauseated; 10-15 # weight loss in past 6 months      Goals:     pt will maintain PO >75% and no further weight loss in next 5-7 days     Monitoring & Evaluation:    - Total energy intake, Protein intake, Liquid meal replacement   - Weight/weight change, Electrolyte and renal profile   -      Previous Nutrition Goals Met:   Yes  Previous Recommendations:    Yes    Education & Discharge Needs:   [] None Identified   [x] Identified and addressed    [x] Participated in care plan, discharge planning, and/or interdisciplinary rounds            Catalina Lino RD

## 2019-09-05 NOTE — PROGRESS NOTES
CM called [facility] [address] spoke with [facility] advised no shift avail for another 2-3 weeks. CM called [facility] advised that they didn't receive the patients file advised to fax to [phone] Attention Avelina Rod. CM spoke with Alysa Rivera advised no available schedules but to send the patient info and call tomorrow to speak with Zeinab Martinez and he may be able to make adjustments to accommodate the patient. CM faxing medicals to [address] [place] [address] ([phone]) and [facility] ([phone]).      CM will follow   PABLITO Marroquin/SARWAT

## 2019-09-05 NOTE — PROGRESS NOTES
CM noted that [facility] representative advised that patients information has been sent to [facility] ([place] [address]) for 12:00 appointments MWF. [facility] representative advised she will follow up with CM as soon as she hears back from [facility]. CM informed the patient of updates. Patient verbalized that he hopes to be discharged today to get back to work and restart OP HD. CM spoke with nephrologist who advised that the patient is unlikely to be accepted at [facility] because of prior history of non-compliance. CM reached out to Autumn Martines with [facility] to escalate and get assistance with placement at the facilities suggested: 1. [facility] [address] [facility] (#[phone]) 2. [facility] ( # [phone]) 3[facility] ( # [phone]).  also followed up with CM to advise that he has spoke to Nurse Managers at [address] and [facility] who both have T/TH/Sat shifts available. CM to follow up this afternoon with both locations. David Ludwig with [facility] also advised that she will assist as much as possible and follow up.      CM will follow up with PABLITO Conn/SARWAT

## 2019-09-05 NOTE — PROGRESS NOTES
Hospitalist Progress Note  Freya Whyte MD  Answering service: [phone] from in house phone      Date of Service:  2019  NAME:  Brady Wilkerson                                                         :  1985                                               MRN:  [id number]      Subjective/interval history    No complaints   Awaiting dialysis chair. Assessment and plan  Acute  pyelonephritis,significant h/o VUR  -Switch ceftriaxone to augmenitn. Urine cx,negative. Contineu abx 7-10 days.  -Renal US:Renal cortical thinning bilaterally with severe hydronephrosis bilaterally,the bilateral hydronephrosis is chronic. He has seen by urologist in the past who recommended to continue dialysis as he is already in ESRD. 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 Advance diet as tolerated. lipase trending down     ESRD,not on HD. Metabolci acidosis. He has right arm AVF. Left arm access thrombosed. History of non-adherence  -patient claims his previous behavior was driven by fear and he now is committed to follow direction and wants to try a different nephrology group. -HD on   and      Hypocalcemia:received  Iv nico gluconate      DVT ppx: scd  Code status: full  Disposition: anticipate home. He needs dialysis chair prior to discharge. Current facility administered and prior to admit medications reviewed. x         Review of Systems:  A comprehensive review of systems was negative except for that written in the HPI. PHYSICAL EXAM:  O:  Visit Vitals  /86 (BP 1 Location: Left arm, BP Patient Position: Sitting)   Pulse 97   Temp 98 °F (36.7 °C)   Resp 18   Ht 5' 5\" (1.651 m)   Wt 70.7 kg (155 lb 12.8 oz)   SpO2 97%   BMI 25.93 kg/m²       General Appears comfortable,not in distress. HEENT Head atraumatic. Unicteric sclera. Moist buccal mucosa. PERRLA     CVS RRR, no MRG, no JVD, no peripheral edema       Chest No deformity  No accessory muscle use  Vesicular air entry symmetrically  No wheezing,ronchi or crepitations       Abdomen &Pelvis Not distended. Normoactive. Soft. Non tender. No hepatomegally       Genitourinary  No CVA or suprapubic tenderness       Musculoskeletal Left thrombosed avf  Right arm AVF +thrill       Skin No erythema,rash,depigmentation       Neurology Mental status: alert and oriented x4  Cranial nerves: CN 2-12 intact. Motor 5/5 through out     Psychiatry            Intake/Output Summary (Last 24 hours) at 9/5/2019 1225  Last data filed at 9/4/2019 1815  Gross per 24 hour   Intake 240 ml   Output 100 ml   Net 140 ml          Recent labs & imaging reviewed:    Problem List as of 9/5/2019 Date Reviewed: 9/21/2018          Codes Class Noted - Resolved    Acute on chronic renal failure (HCC) ICD-10-CM: N17.9, N18.9  ICD-9-CM: 584.9, 585.9 Present on Admission 8/11/2015 - Present        Crohn disease ([facility] ICD-10-CM: K50.90  ICD-9-CM: 542. 9 Chronic 8/11/2015 - Present        Vesicoureteral reflux with nephropathy (Chronic) ICD-10-CM: N13.729  ICD-9-CM: 593.73  7/30/2015 - Present        Uremia ICD-10-CM: N19  ICD-9-CM: 586  8/28/2019 - Present        Elevated lipase ICD-10-CM: R74.8  ICD-9-CM: 790.5  8/28/2019 - Present        * (Principal) UTI (urinary tract infection) ICD-10-CM: N39.0  ICD-9-CM: 599.0  8/28/2019 - Present        Thrombosis of arteriovenous dialysis fistula (HCC) ICD-10-CM: C58.083A  ICD-9-CM: 996.73  10/23/2015 - Present        Acute internal jugular vein thrombosis (HCC) ICD-10-CM: T49.R04  ICD-9-CM: 453.86  8/18/2015 - Present        ESRF (end stage renal failure) ([facility] ICD-10-CM: N18.6  ICD-9-CM: 585.6  7/30/2015 - Present    Overview Signed 7/30/2015 12:08 PM by Rg Fitzpatrick MD     Has been on and off dialysis.   Not on it at the time of admission due to non-compliance             Hydroureteronephrosis ICD-10-CM: N13.30  ICD-9-CM: 591  8/31/2013 - Present ESRD on dialysis [facility]) ICD-10-CM: N18.6, Z99.2  ICD-9-CM: 585.6, V45.11  8/31/2013 - Present                Tiffanie Ramirez MD  Internal Medicine  Date of Service: 9/5/2019

## 2019-09-06 VITALS
OXYGEN SATURATION: 97 % | RESPIRATION RATE: 18 BRPM | HEIGHT: 65 IN | WEIGHT: 150.9 LBS | TEMPERATURE: 98.7 F | BODY MASS INDEX: 25.14 KG/M2 | DIASTOLIC BLOOD PRESSURE: 79 MMHG | SYSTOLIC BLOOD PRESSURE: 133 MMHG | HEART RATE: 69 BPM

## 2019-09-06 PROBLEM — N19 UREMIA: Status: RESOLVED | Noted: 2019-08-28 | Resolved: 2019-09-06

## 2019-09-06 PROBLEM — N39.0 UTI (URINARY TRACT INFECTION): Status: RESOLVED | Noted: 2019-08-28 | Resolved: 2019-09-06

## 2019-09-06 PROBLEM — R74.8 ELEVATED LIPASE: Status: RESOLVED | Noted: 2019-08-28 | Resolved: 2019-09-06

## 2019-09-06 PROCEDURE — 90935 HEMODIALYSIS ONE EVALUATION: CPT

## 2019-09-06 PROCEDURE — 74011250637 HC RX REV CODE- 250/637: Performed by: INTERNAL MEDICINE

## 2019-09-06 RX ORDER — SEVELAMER CARBONATE 800 MG/1
800 TABLET, FILM COATED ORAL
Qty: 90 TAB | Refills: 3 | Status: SHIPPED | OUTPATIENT
Start: 2019-09-06 | End: 2020-11-09

## 2019-09-06 RX ORDER — ERGOCALCIFEROL 1.25 MG/1
50000 CAPSULE ORAL
Qty: 30 CAP | Refills: 1 | Status: SHIPPED | OUTPATIENT
Start: 2019-09-09 | End: 2019-09-30

## 2019-09-06 RX ADMIN — ASCORBIC ACID, THIAMINE MONONITRATE,RIBOFLAVIN, NIACINAMIDE, PYRIDOXINE HYDROCHLORIDE, FOLIC ACID, CYANOCOBALAMIN, BIOTIN, CALCIUM PANTOTHENATE, 1 CAPSULE: 100; 1.5; 1.7; 20; 10; 1; 6000; 150000; 5 CAPSULE, LIQUID FILLED ORAL at 12:24

## 2019-09-06 RX ADMIN — SEVELAMER CARBONATE 800 MG: 800 TABLET, FILM COATED ORAL at 12:24

## 2019-09-06 RX ADMIN — Medication 1 TABLET: at 12:23

## 2019-09-06 NOTE — PROGRESS NOTES
Call placed to [facility] Gu[facility] to advise that though Dr. Bethanie Garcia has provided locations that is suppose to assist and agreed to see the patient, the [facility] locations are still either advising no because of compliance issues  or shift availability. Al Munson advised she will escalate further. CM also placed call to Dr. Mena Pisano office ([phone]) left message to return call per recent note from nephrologist that states [facility] is going to accept. CM needs clear understanding of next steps to assist with HD restart per it appears that CM is being advised differently from nephrologist pertaining schedule availability and acceptance. CM followed up with patient. Patient advised that he has to return to work. Patient advised that he may have to leave to keep his job so that he can support his family. Patient advised that he doesn't receive SSI and he must work to support himself and his family. CM discussed outcome with restarting HD with Dr. Bethanie Garcia and he advised he will follow up with the [facility] locations discussed. CM received return call from Dr. Bethanie Garcia who advised that Danal[facility] Nathan gave the schedule that was available to another patient unfortunately and advised that he had spoke with Suzan Elliott at [facility] location who advised that an afternoon shift is available and to send over the patients information. CM called [facility] spoke with Suzan Elliott who advised that CM cant fax the patients information it will need to go through intake. CM called [facility] spoke with Chelsea Hare who advised she will send the patients information to the [facility]. CM will follow up with [facility] to see if it has been received. CM confirmed with [facility] that the patient has been accepted for T/TH/Sat @ 11. CM informed the patient. The patient was very excited and relieved. Patients spouse is at work so patient needs assistance with transportation. ROMELIA arranged Lyft via round trip for 3:05.  ROMELIA informed patient to arrive on Tuesday 9/10 per [facility] Sheehan doesn't do weekend discharges. Lyft cancelled due to the patients family will is picking up from patient discharge area.      CM will follow  PABLITO Camp/SARWAT

## 2019-09-06 NOTE — DISCHARGE INSTRUCTIONS
Discharge Instructions       PATIENT ID: Jesse Thurman  MRN: [id number]   YOB: 1985    DATE OF ADMISSION: 8/28/2019  1:30 PM    DATE OF DISCHARGE: 9/6/2019    PRIMARY CARE PROVIDER: Renetta Hill MD     ATTENDING PHYSICIAN: Raina Kauffman MD  DISCHARGING PROVIDER: Marrian Dubin, MD    To contact this individual call [phone] and ask the  to page. If unavailable ask to be transferred the Adult Hospitalist Department. DISCHARGE DIAGNOSES   Acute pyelonephritis  Hypocalcemia  Elevated lipase   ESRD  History of non-adherence      CONSULTATIONS: IP CONSULT TO HOSPITALIST  IP CONSULT TO NEPHROLOGY    PROCEDURES/SURGERIES: * No surgery found *      FOLLOW UP APPOINTMENTS:   Follow-up Information     Follow up With Specialties Details Why Contact Info      In 1 week      Kavon Holder MD Nephrology Go on 9/9/2019  [address] [place] [address] [phone]               DIET: Renal Diet    ACTIVITY: Activity as tolerated      DISCHARGE MEDICATIONS:   See Medication Reconciliation Form    · It is important that you take the medication exactly as they are prescribed. · Keep your medication in the bottles provided by the pharmacist and keep a list of the medication names, dosages, and times to be taken in your wallet. · Do not take other medications without consulting your doctor. NOTIFY YOUR PHYSICIAN FOR ANY OF THE FOLLOWING:   Fever over 101 degrees for 24 hours. Chest pain, shortness of breath, fever, chills, nausea, vomiting, diarrhea, change in mentation, falling, weakness, bleeding. Severe pain or pain not relieved by medications. Or, any other signs or symptoms that you may have questions about.       DISPOSITION:  x  Home With:   OT  PT  HH  RN       SNF/Inpatient Rehab/LTAC    Independent/assisted living    Hospice    Other:         Signed:   Marrian Dubin, MD  9/6/2019  12:13 PM

## 2019-09-06 NOTE — PROGRESS NOTES
Name: Kaz Kingston MRN: [id number]   : 1985 Hospital: [address]   Date: 2019        IMPRESSION:   · ESRD on HD. Patient was seen during HD treatment in the HD suite in the presence of acute [facility]. · H/O non compliance with HD Txs. Patient is discharged from his home unit. PLAN:   · Complete a full HD Tx today  · HD again on Monday  ·  is looking for a permanent placement. Patient was educated regarding the importance of compliance. He will be accepted by our Practice at [facility] where there is a bed available. Subjective/Interval History:   I have reviewed the flowsheet and previous days notes. ROS:A comprehensive review of systems was negative. Objective:   Vital Signs:    Visit Vitals  /71   Pulse (!) 59   Temp 98 °F (36.7 °C) (Oral)   Resp 16   Ht 5' 5\" (1.651 m)   Wt 68.4 kg (150 lb 14.4 oz)   SpO2 100%   BMI 25.11 kg/m²       O2 Device: Room air       Temp (24hrs), Av.4 °F (36.9 °C), Min:98 °F (36.7 °C), Max:98.7 °F (37.1 °C)       Intake/Output:   Last shift:      No intake/output data recorded. Last 3 shifts:  1901 -  0700  In: -   Out: 600 [Urine:600]    Intake/Output Summary (Last 24 hours) at 2019 1108  Last data filed at 2019 1100  Gross per 24 hour   Intake    Output 600 ml   Net -600 ml        Physical Exam:  General:    Alert, cooperative, no distress, appears stated age. HD treatment is in progress. Head:   Normocephalic, without obvious abnormality, atraumatic. Eyes:   Conjunctivae/corneas clear. Lungs:   Clear to auscultation bilaterally. No Wheezing or Rhonchi. No rales. Chest wall:  No tenderness or deformity. No Accessory muscle use. Heart:   Regular rate and rhythm,  no murmur, rub or gallop. Abdomen:   Soft, non-tender. Not distended. Bowel sounds normal. No masses  Extremities: Extremities normal, atraumatic, No cyanosis. No edema.  No clubbing  Skin:     Texture, turgor normal. No rashes or lesions. Not Jaundiced  Lymph nodes: Cervical, supraclavicular normal.  Psych:  Good insight. Not depressed. Not anxious or agitated. Neurologic: Normal strength, Alert and oriented X 3. DATA:  Labs:  No results for input(s): NA, K, CL, CO2, BUN, CREA, CA, ALB, PHOS, MG in the last 72 hours. Recent Labs     09/04/19  0535   WBC 5.8   HGB 9.6*   HCT 31.5*   *     No results for input(s): UMM, KU, CLU, CREAU in the last 72 hours.     No lab exists for component: PROU    Total time spent with patient:  35 minutes    [] Critical Care Provided    Care Plan discussed with:   Lawrence Rincon MD

## 2019-09-06 NOTE — PROGRESS NOTES
CM called [facility] to confirm patients information received. CM spoke with [facility] who confirmed the patients information was received but she had just spoke with the medical director and was advised that they are not willing to accept the patient because of lack of compliance in the past. CM has attempted to obtain schedules to restart HD for patient at the following locations and were all denied for past noncompliance or no schedules available. -[facility]-denied  -[place]-denied  -[facility]-denied  -[address]-advised no shifts available 2-3 weeks    CM attempting to fax medicals to [facility] but fax# provided is busy. CM called to confirm fax [phone] is correct. CM advised that fax is correct and suggested to keep trying. CM informed that Augustin Mike would be the person to discuss restarting HD and if schedules are available. CM left message for Augustin Mike and is awaiting a response. CM will continue to try and fax the patient information to [facility].     CM will follow   PABLITO Mcgarry/SARWAT

## 2019-09-06 NOTE — DISCHARGE SUMMARY
Discharge Summary       PATIENT ID: Eufemia Cunningham  MRN: [id number]   YOB: 1985    DATE OF ADMISSION: 8/28/2019  1:30 PM    DATE OF DISCHARGE: 09/06/2019   PRIMARY CARE PROVIDER: Ean Schmitt MD       DISCHARGING PROVIDER: Remedios Grajeda MD    To contact this individual call [phone] and ask the  to page. If unavailable ask to be transferred the Adult Hospitalist Department. CONSULTATIONS: IP CONSULT TO HOSPITALIST  IP CONSULT TO NEPHROLOGY    PROCEDURES/SURGERIES: * No surgery found *    ADMITTING DIAGNOSES & HOSPITAL COURSE:     36year old gentleman with significant PMH of ESRD, who did not have dialysis in over a year and came from PCP office for flank pain. ESRD,not on HD. Metabolic acidosis. He has right arm AVF. Left arm access thrombosed. History of non-adherence  -patient claims his previous behavior was driven by fear and he now is committed to follow direction and wants to try a different nephrology group.     -HD started on 08/30.   - CM is trying to set up HD as outpatient but she has not found a place yet. Dr Elvin Agosto is willing to accept patient in her dialysis unit. Patient requests to be discharged today since he has to work and understands that he needs to come on Monday to our ED if he does not have an outpatient HD chair. Acute  pyelonephritis, significant h/o VUR  Acute pyelonephritis still suspected after negative urine culture given acute flank pain, dysuria and renal us findings.   -completed 7-day antibiotic therapy while inpatient. Initially in ceftriaxone, then switched to augmenitn.   Urine cx negative.  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 - Patient might come to our ED for next HD if no outpatient HD chair has been set up. FOLLOW UP APPOINTMENTS:    Follow-up Information     Follow up With Specialties Details Why Contact Info      In 1 week      Clearance MD [facility] Go on 9/9/2019  [address] [place] [address] Dr OWEN [phone]               DIET: Renal Diet    ACTIVITY: Activity as tolerated            DISCHARGE MEDICATIONS:  Current Discharge Medication List      START taking these medications    Details   b complex-vitamin c-folic acid (NEPHROCAPS) 1 mg capsule Take 1 Cap by mouth daily. Qty: 30 Cap, Refills: 0      epoetin tin-epbx (RETACRIT) 4,000 unit/mL injection 1 mL by SubCUTAneous route DIALYSIS MON, WED & FRI. Indications: anemia due to kidney failure, method of removing waste/poison from blood with dialysis  Qty: 1 Vial, Refills: 0      ergocalciferol (ERGOCALCIFEROL) 50,000 unit capsule Take 1 Cap by mouth every seven (7) days. Qty: 30 Cap, Refills: 1      iron mltsyu-I-V99-Ca-suc-stoma (CHROMAGEN) tablet Take 1 Tab by mouth daily. Qty: 30 Tab, Refills: 1      sevelamer carbonate (RENVELA) 800 mg tab tab Take 1 Tab by mouth three (3) times daily (with meals). Qty: 90 Tab, Refills: 3         STOP taking these medications       sodium bicarbonate 650 mg tablet Comments:   Reason for Stopping:                 NOTIFY YOUR PHYSICIAN FOR ANY OF THE FOLLOWING:   Fever over 101 degrees for 24 hours. Chest pain, shortness of breath, fever, chills, nausea, vomiting, diarrhea, change in mentation, falling, weakness, bleeding. Severe pain or pain not relieved by medications. Or, any other signs or symptoms that you may have questions about.     DISPOSITION:   x Home With:   OT  PT  DANITA  RN       Long term SNF/Inpatient Rehab    Independent/assisted living    Hospice    Other: PATIENT CONDITION AT DISCHARGE:     Functional status    Poor     Deconditioned    x Independent      Cognition   x  Lucid     Forgetful     Dementia      Catheters/lines (plus indication)    Bhandari     PICC     PEG    x None      Code status   x  Full code     DNR      PHYSICAL EXAMINATION AT DISCHARGE:  General:  Alert, cooperative, no distress, appears stated age. Patient had no symptoms at discharge. No nausea. No vomiting. No abdominal pain. He was eating and ambulating well. Eyes:  Conjunctivae/corneas clear. Mouth/Throat: Moist oropharynx   Neck: Supple   Back:    No CVA tenderness. Lungs:   Clear to auscultation bilaterally. Heart:  Regular rat. S1, S2 normal,   Abdomen:   Soft, non-tender. Bowel sounds normal.    Extremities: RUE AVF. Good bruit. a.   Pulses: 2+ and symmetric all extremities. Skin: No rashes   Neurologic: AOx3. Normal strength, sensation and reflexes throughout. ROS: Except as documented all systems reviewed and negative. CHRONIC MEDICAL DIAGNOSES:  Problem List as of 9/6/2019 Date Reviewed: 9/6/2019          Codes Class Noted - Resolved    Acute on chronic renal failure [facility]) ICD-10-CM: N17.9, N18.9  ICD-9-CM: 584.9, 585.9 Present on Admission 8/11/2015 - Present        Crohn disease ([facility] [address] ICD-10-CM: K50.90  ICD-9-CM: 631. 9 Chronic 8/11/2015 - Present        Vesicoureteral reflux with nephropathy (Chronic) ICD-10-CM: N13.729  ICD-9-CM: 593.73  7/30/2015 - Present        Thrombosis of arteriovenous dialysis fistula (HCC) ICD-10-CM: B60.668S  ICD-9-CM: 996.73  10/23/2015 - Present        Acute internal jugular vein thrombosis (HCC) ICD-10-CM: O82.H38  ICD-9-CM: 453.86  8/18/2015 - Present        ESRF (end stage renal failure) ([facility] [address] ICD-10-CM: N18.6  ICD-9-CM: 585.6  7/30/2015 - Present    Overview Signed 7/30/2015 12:08 PM by Cari David MD     Has been on and off dialysis.   Not on it at the time of admission due to non-compliance Hydroureteronephrosis ICD-10-CM: N13.30  ICD-9-CM: 638  8/31/2013 - Present        ESRD on dialysis [facility]) ICD-10-CM: N18.6, Z99.2  ICD-9-CM: 585.6, V45.11  8/31/2013 - Present        RESOLVED: Uremia ICD-10-CM: N19  ICD-9-CM: 489  8/28/2019 - 9/6/2019        RESOLVED: Elevated lipase ICD-10-CM: R74.8  ICD-9-CM: 790.5  8/28/2019 - 9/6/2019        * (Principal) RESOLVED: UTI (urinary tract infection) ICD-10-CM: N39.0  ICD-9-CM: 599.0  8/28/2019 - 9/6/2019              Greater than 40 minutes were spent with the patient on counseling and coordination of care    Signed:   Anjum Zaidi MD  9/6/2019  12:15 PM

## 2019-09-12 NOTE — CDMP QUERY
Patient admitted with acute flank pain. Documentation reflects a diagnosis of acute pyelonephritis in note(s) dated 08/30 up until D/C (09/06). If possible, please specify in the progress notes and d/c summary if acute pyelonephritis was:    =>Ruled out after study  =>Still Suspected after study        =>Confirmed after study      The medical record reflects the following:       Risk Factors: Hx. ESRD not on HD x 1 year due to non-compliance, congenital hydroureteronephrosis, Vesicouretic reflux, straight catheterization PTA and during stay       Clinical Indicators: Per H&P, complaints of flank pain,aching type,6/10, non radiating associated with dysuria without nausea,vomiting,fever or chills.  UTI with left flank pain suspect pyelonephritis,significant h/o VUR  Per PN 8/29-Renal cortical thinning bilaterally with severe hydronephrosis bilaterally,the bilateral hydronephrosis is chronic.  8/29-Nephrology consult-UTI: on Abx   WBC 9.1 on 8/28, urine culture negative       Treatment: Ceftriaxone 1G IV every day, then to Augmentin 500-125 mg/qd ,renal ultrasound, nephrology consult, urine culture, monitor vital signs      Thank you,     Arabella MATAN, RN  CDI   [phone]

## 2019-09-20 NOTE — CDMP QUERY
Pt admitted with acute pyelonephritis. There is noted documentation of chemical pancreatitis without clinical symptoms in the progress notes. If possible, please clarify if this can be further specified as any of the following:  Chemical Pancreatitis w/o clinical symptoms - not clinically insignificant   (Elevated Lipase only)  Chemical Pancreatitis w/o clinical symptoms - clinically significant   (Being treated, managed, & monitored this admission)  Clinically unable to determine  Other, please specify  Unknown Clinical Indicators:  31yo male with presenting elevated lipase of 955 Risk Factors:   
-ED provider noted Azam Atkins appears to have acute pancreatitis 
-H&P:  elevated lipase & concern for pancreatitis; though h/o elevated pancreatic enzymes in the past & denied alcohol abuse 
-H&P DX:  Elevated Lipase. Chemical Pancreatitis w/o clinical symptoms 
-per RD note, denies N/V for most part; only eats 1 meal in the evening and even that makes him nauseated Treatment: NPO, IVFs, repeat labs in a.m., Registered Dietitian consult, advanced diet as tolerated beginning the 2nd day Thank you, ADOLFO PearsonN, RN, CCDS 
CDI Supervisor, [facility] 
Office# [phone]

## 2019-09-30 ENCOUNTER — HOSPITAL ENCOUNTER (OUTPATIENT)
Age: 34
Setting detail: OBSERVATION
Discharge: HOME | End: 2019-10-01
Attending: EMERGENCY MEDICINE | Admitting: INTERNAL MEDICINE
Payer: MEDICARE

## 2019-09-30 ENCOUNTER — APPOINTMENT (OUTPATIENT)
Dept: CT IMAGING | Age: 34
End: 2019-09-30
Attending: EMERGENCY MEDICINE
Payer: MEDICARE

## 2019-09-30 DIAGNOSIS — K85.80 OTHER ACUTE PANCREATITIS, UNSPECIFIED COMPLICATION STATUS: ICD-10-CM

## 2019-09-30 DIAGNOSIS — R31.9 URINARY TRACT INFECTION WITH HEMATURIA, SITE UNSPECIFIED: Primary | ICD-10-CM

## 2019-09-30 DIAGNOSIS — N13.30 HYDRONEPHROSIS, UNSPECIFIED HYDRONEPHROSIS TYPE: ICD-10-CM

## 2019-09-30 DIAGNOSIS — N13.30 BILATERAL HYDRONEPHROSIS: ICD-10-CM

## 2019-09-30 DIAGNOSIS — N39.0 URINARY TRACT INFECTION WITH HEMATURIA, SITE UNSPECIFIED: Primary | ICD-10-CM

## 2019-09-30 DIAGNOSIS — N18.9 CHRONIC RENAL FAILURE, UNSPECIFIED CKD STAGE: ICD-10-CM

## 2019-09-30 LAB
ALBUMIN SERPL-MCNC: 3.2 G/DL (ref 3.5–5)
ALBUMIN/GLOB SERPL: 0.7 {RATIO} (ref 1.1–2.2)
ALP SERPL-CCNC: 95 U/L (ref 45–117)
ALT SERPL-CCNC: 15 U/L (ref 12–78)
ANION GAP SERPL CALC-SCNC: 6 MMOL/L (ref 5–15)
APPEARANCE UR: CLEAR
AST SERPL-CCNC: 19 U/L (ref 15–37)
BACTERIA URNS QL MICRO: ABNORMAL /HPF
BASOPHILS # BLD: 0 K/UL (ref 0–0.1)
BASOPHILS NFR BLD: 1 % (ref 0–1)
BILIRUB SERPL-MCNC: 0.4 MG/DL (ref 0.2–1)
BILIRUB UR QL: NEGATIVE
BUN SERPL-MCNC: 45 MG/DL (ref 6–20)
BUN/CREAT SERPL: 5 (ref 12–20)
CALCIUM SERPL-MCNC: 6.5 MG/DL (ref 8.5–10.1)
CHLORIDE SERPL-SCNC: 105 MMOL/L (ref 97–108)
CO2 SERPL-SCNC: 28 MMOL/L (ref 21–32)
COLOR UR: ABNORMAL
COMMENT, HOLDF: NORMAL
CREAT SERPL-MCNC: 9.47 MG/DL (ref 0.7–1.3)
DIFFERENTIAL METHOD BLD: ABNORMAL
EOSINOPHIL # BLD: 0.2 K/UL (ref 0–0.4)
EOSINOPHIL NFR BLD: 3 % (ref 0–7)
EPITH CASTS URNS QL MICRO: ABNORMAL /LPF
ERYTHROCYTE [DISTWIDTH] IN BLOOD BY AUTOMATED COUNT: 14.6 % (ref 11.5–14.5)
GLOBULIN SER CALC-MCNC: 4.8 G/DL (ref 2–4)
GLUCOSE SERPL-MCNC: 112 MG/DL (ref 65–100)
GLUCOSE UR STRIP.AUTO-MCNC: NEGATIVE MG/DL
HCT VFR BLD AUTO: 34.2 % (ref 36.6–50.3)
HGB BLD-MCNC: 10.4 G/DL (ref 12.1–17)
HGB UR QL STRIP: ABNORMAL
IMM GRANULOCYTES # BLD AUTO: 0 K/UL (ref 0–0.04)
IMM GRANULOCYTES NFR BLD AUTO: 1 % (ref 0–0.5)
KETONES UR QL STRIP.AUTO: NEGATIVE MG/DL
LEUKOCYTE ESTERASE UR QL STRIP.AUTO: ABNORMAL
LIPASE SERPL-CCNC: 450 U/L (ref 73–393)
LYMPHOCYTES # BLD: 1.6 K/UL (ref 0.8–3.5)
LYMPHOCYTES NFR BLD: 27 % (ref 12–49)
MCH RBC QN AUTO: 29 PG (ref 26–34)
MCHC RBC AUTO-ENTMCNC: 30.4 G/DL (ref 30–36.5)
MCV RBC AUTO: 95.3 FL (ref 80–99)
MONOCYTES # BLD: 0.4 K/UL (ref 0–1)
MONOCYTES NFR BLD: 7 % (ref 5–13)
NEUTS SEG # BLD: 3.7 K/UL (ref 1.8–8)
NEUTS SEG NFR BLD: 61 % (ref 32–75)
NITRITE UR QL STRIP.AUTO: NEGATIVE
NRBC # BLD: 0 K/UL (ref 0–0.01)
NRBC BLD-RTO: 0 PER 100 WBC
PH UR STRIP: 7 [PH] (ref 5–8)
PLATELET # BLD AUTO: 214 K/UL (ref 150–400)
PMV BLD AUTO: 10.5 FL (ref 8.9–12.9)
POTASSIUM SERPL-SCNC: 4.3 MMOL/L (ref 3.5–5.1)
PROT SERPL-MCNC: 8 G/DL (ref 6.4–8.2)
PROT UR STRIP-MCNC: 100 MG/DL
RBC # BLD AUTO: 3.59 M/UL (ref 4.1–5.7)
RBC #/AREA URNS HPF: ABNORMAL /HPF (ref 0–5)
SAMPLES BEING HELD,HOLD: NORMAL
SODIUM SERPL-SCNC: 139 MMOL/L (ref 136–145)
SP GR UR REFRACTOMETRY: 1.01 (ref 1–1.03)
UR CULT HOLD, URHOLD: NORMAL
UROBILINOGEN UR QL STRIP.AUTO: 0.2 EU/DL (ref 0.2–1)
WBC # BLD AUTO: 6 K/UL (ref 4.1–11.1)
WBC CASTS URNS QL MICRO: ABNORMAL /LPF
WBC URNS QL MICRO: >100 /HPF (ref 0–4)

## 2019-09-30 PROCEDURE — 99218 HC RM OBSERVATION: CPT

## 2019-09-30 PROCEDURE — 96375 TX/PRO/DX INJ NEW DRUG ADDON: CPT

## 2019-09-30 PROCEDURE — 96374 THER/PROPH/DIAG INJ IV PUSH: CPT

## 2019-09-30 PROCEDURE — 83690 ASSAY OF LIPASE: CPT

## 2019-09-30 PROCEDURE — 74011250636 HC RX REV CODE- 250/636: Performed by: EMERGENCY MEDICINE

## 2019-09-30 PROCEDURE — 74011250637 HC RX REV CODE- 250/637: Performed by: INTERNAL MEDICINE

## 2019-09-30 PROCEDURE — 80053 COMPREHEN METABOLIC PANEL: CPT

## 2019-09-30 PROCEDURE — 74011250636 HC RX REV CODE- 250/636: Performed by: INTERNAL MEDICINE

## 2019-09-30 PROCEDURE — 85025 COMPLETE CBC W/AUTO DIFF WBC: CPT

## 2019-09-30 PROCEDURE — 81001 URINALYSIS AUTO W/SCOPE: CPT

## 2019-09-30 PROCEDURE — 87086 URINE CULTURE/COLONY COUNT: CPT

## 2019-09-30 PROCEDURE — 99284 EMERGENCY DEPT VISIT MOD MDM: CPT

## 2019-09-30 PROCEDURE — 36415 COLL VENOUS BLD VENIPUNCTURE: CPT

## 2019-09-30 PROCEDURE — 51798 US URINE CAPACITY MEASURE: CPT

## 2019-09-30 PROCEDURE — 74176 CT ABD & PELVIS W/O CONTRAST: CPT

## 2019-09-30 RX ORDER — ONDANSETRON 2 MG/ML
4 INJECTION INTRAMUSCULAR; INTRAVENOUS
Status: DISCONTINUED | OUTPATIENT
Start: 2019-09-30 | End: 2019-10-01 | Stop reason: HOSPADM

## 2019-09-30 RX ORDER — CALCIUM CARBONATE 200(500)MG
400 TABLET,CHEWABLE ORAL
Status: DISCONTINUED | OUTPATIENT
Start: 2019-09-30 | End: 2019-10-01 | Stop reason: HOSPADM

## 2019-09-30 RX ORDER — SEVELAMER CARBONATE 800 MG/1
800 TABLET, FILM COATED ORAL
Status: DISCONTINUED | OUTPATIENT
Start: 2019-09-30 | End: 2019-10-01 | Stop reason: HOSPADM

## 2019-09-30 RX ORDER — OXYCODONE HYDROCHLORIDE 5 MG/1
5 TABLET ORAL
Status: DISCONTINUED | OUTPATIENT
Start: 2019-09-30 | End: 2019-10-01 | Stop reason: HOSPADM

## 2019-09-30 RX ORDER — ERGOCALCIFEROL 1.25 MG/1
50000 CAPSULE ORAL
COMMUNITY
End: 2020-11-09

## 2019-09-30 RX ORDER — SODIUM CHLORIDE 0.9 % (FLUSH) 0.9 %
5-40 SYRINGE (ML) INJECTION AS NEEDED
Status: DISCONTINUED | OUTPATIENT
Start: 2019-09-30 | End: 2019-10-01 | Stop reason: HOSPADM

## 2019-09-30 RX ORDER — HEPARIN SODIUM 5000 [USP'U]/ML
5000 INJECTION, SOLUTION INTRAVENOUS; SUBCUTANEOUS EVERY 8 HOURS
Status: DISCONTINUED | OUTPATIENT
Start: 2019-09-30 | End: 2019-10-01 | Stop reason: HOSPADM

## 2019-09-30 RX ORDER — ERGOCALCIFEROL 1.25 MG/1
50000 CAPSULE ORAL
Status: DISCONTINUED | OUTPATIENT
Start: 2019-09-30 | End: 2019-10-01 | Stop reason: HOSPADM

## 2019-09-30 RX ORDER — MORPHINE SULFATE 4 MG/ML
4 INJECTION INTRAVENOUS ONCE
Status: COMPLETED | OUTPATIENT
Start: 2019-09-30 | End: 2019-09-30

## 2019-09-30 RX ORDER — MORPHINE SULFATE 2 MG/ML
4 INJECTION, SOLUTION INTRAMUSCULAR; INTRAVENOUS
Status: DISCONTINUED | OUTPATIENT
Start: 2019-09-30 | End: 2019-09-30 | Stop reason: SDUPTHER

## 2019-09-30 RX ORDER — ACETAMINOPHEN 325 MG/1
650 TABLET ORAL
Status: DISCONTINUED | OUTPATIENT
Start: 2019-09-30 | End: 2019-10-01 | Stop reason: HOSPADM

## 2019-09-30 RX ORDER — HYDROMORPHONE HYDROCHLORIDE 1 MG/ML
1 INJECTION, SOLUTION INTRAMUSCULAR; INTRAVENOUS; SUBCUTANEOUS ONCE
Status: COMPLETED | OUTPATIENT
Start: 2019-09-30 | End: 2019-09-30

## 2019-09-30 RX ORDER — ONDANSETRON 2 MG/ML
4 INJECTION INTRAMUSCULAR; INTRAVENOUS
Status: COMPLETED | OUTPATIENT
Start: 2019-09-30 | End: 2019-09-30

## 2019-09-30 RX ORDER — SODIUM CHLORIDE 0.9 % (FLUSH) 0.9 %
5-40 SYRINGE (ML) INJECTION EVERY 8 HOURS
Status: DISCONTINUED | OUTPATIENT
Start: 2019-09-30 | End: 2019-10-01 | Stop reason: HOSPADM

## 2019-09-30 RX ADMIN — HYDROMORPHONE HYDROCHLORIDE 1 MG: 1 INJECTION, SOLUTION INTRAMUSCULAR; INTRAVENOUS; SUBCUTANEOUS at 11:49

## 2019-09-30 RX ADMIN — ERGOCALCIFEROL 50000 UNITS: 1.25 CAPSULE ORAL at 14:00

## 2019-09-30 RX ADMIN — OXYCODONE HYDROCHLORIDE 5 MG: 5 TABLET ORAL at 14:00

## 2019-09-30 RX ADMIN — SEVELAMER CARBONATE 800 MG: 800 TABLET, FILM COATED ORAL at 17:12

## 2019-09-30 RX ADMIN — ONDANSETRON 4 MG: 2 INJECTION INTRAMUSCULAR; INTRAVENOUS at 07:52

## 2019-09-30 RX ADMIN — SEVELAMER CARBONATE 800 MG: 800 TABLET, FILM COATED ORAL at 14:00

## 2019-09-30 RX ADMIN — CALCIUM CARBONATE (ANTACID) CHEW TAB 500 MG 400 MG: 500 CHEW TAB at 17:12

## 2019-09-30 RX ADMIN — MORPHINE SULFATE 4 MG: 4 INJECTION INTRAVENOUS at 07:52

## 2019-09-30 RX ADMIN — Medication 10 ML: at 22:00

## 2019-09-30 RX ADMIN — CALCIUM CARBONATE (ANTACID) CHEW TAB 500 MG 400 MG: 500 CHEW TAB at 14:00

## 2019-09-30 NOTE — PROGRESS NOTES
Date of previous inpatient admission/ ED visit? Last IP admission was 08/28/2019-09/06/2019 for UTI. Pt RRAT score is 8- LOW. What brought the patient back to ED? Per ER Triage: Patient presents to the emergency department reporting right sided flank pain. Patient reports the pain has been ongoing for about a week. Patient states he was admitted about two weeks ago and was placed on dialysis. Patient reports having had flank pain prior to being placed back on dialysis. Patient reports he has flank pain when urinating. Did patient decline recommended services during last admission/ ED visit (if yes, what)? No    Has patient seen a provider since their last inpatient admission/ED visit (if yes, when)? Pt has been to scheduled dialysis since discharge. CM Interventions:  From previous inpatient admission/ED visit: CM arranged HD for pt at [facility] Location on T/Th/Sat 1100. From current inpatient admission/ED visit: Pt being evaluated in the ED at this time. Disposition needs TBD/subject to change pending care recommendations. CM will assist with any disposition needs as they arise. CM met with pt at bedside to discuss CM role and to assess pt needs. CM verified demographics including insurance and emergency contact information. Pt lives with girlfriend, Jennifer Bradley (KX#:[phone]), in an apartment. Pt reports Perry Kolby will provide transportation home after discharge. Pt reports no DME use and IADLs prior to admission. Pt uses [facility] on [address] [facility] [place] and reports no concerns with getting medications. Pt reports he has made a new patient appointment with a Debra Lopez NP on [address] [facility] for 10/09/2019. Pt reports no concerns for discharge at this time. Care Management Interventions  PCP Verified by CM: Yes  Palliative Care Criteria Met (RRAT>21 & CHF Dx)?: No  Mode of Transport at Discharge:  Other (see comment)(Aditiienjuan francisco, Delon)  Transition of Care Consult (CM Consult): Other(Readmission Assessment)  Gus Signup: No  Discharge Durable Medical Equipment: No  Physical Therapy Consult: No  Occupational Therapy Consult: No  Speech Therapy Consult: No  Current Support Network: Other  Confirm Follow Up Transport: Family  Plan discussed with Pt/Family/Caregiver: Yes  Discharge Location  Discharge Placement: Home(Disposition TBD/subject to change pending care recommendations)    Chucho Mejia RN, BSN  Care Management Department    [phone]: CM contacted [facility] office to confirm appointment on 10/09/2019 and provider name. CM notified pt had an appointment on 09/16 that was rescheduled for 09/18. Pt did not show for appointment on 09/18. CM clarified with pt; pt reports mixup and would like to reschedule. CM to arrange new appointment. Appointment scheduled for 10/09/2019 at 21  at [address].   CM to place updated appointment on AVS.

## 2019-09-30 NOTE — CONSULTS
NEPHROLOGY CONSULT NOTE     Patient: Louann Sarah MRN: [id number]  PCP: Justo Dubin, MD   :     1985  Age:   36 y.o. Sex:  male      Referring physician: Elvin Pettit DO     Reason for consultation:     ESRD (on HD)      Mr. Eros Schmidt was seen and examined in Room 511 @ [place] this afternoon. Admission Date: 2019  6:50 AM  LOS: 0 days     DISCUSSION / PLAN :   Bilateral Hydronephrosis has been present for some time. It was noted on imaging in 2017. Mr. Eros Schmidt indicated he was evaluated by the [facility] team (in 2017) during an admission to [facility] with a Urology opinion while he is at [address] [place] [address]. Antibiotic therapy will be deferred to the Admitting Team.    Continue Ergo --> 50,000 units every 7 days. Add TUMS --> 2 tabs (with meals) to help address the hypocalcemia. Continue EPO with HD. Dialysis again tomorrow , . There is no indication for acute dialysis today. He is comfortable today with a normal serum potassium. Dose meds for ESRD. Active Problems / Assessment AAActive  : Active Problems:    Bilateral hydronephrosis (2019)    2. ESRD - sec to VUR - on HD () --[address]  3. Anemia in CKD  4. Secondary Hyperparathyroidism  5. Vitamin D Deficiency   6. Hypocalcemia - sec to # 4 , # 5  7. ? Pyelonephritis        Subjective:       \" Side pain last night. .came back . Shantanu Lanier \"          HPI:       Mr. Eros Schmidt is a 36year old gentleman with a history of ESRD , Anemia Of Chronic Disease, Bilateral Hydroureteronephrosis, Crohn's Disease and Vitamin D Deficiency. He is on maintenance hemodialysis at the [facility] HD clinic. He is on a  schedule and had an uneventful session on Saturday (). Mr. Eros Schmidt was treated for a urinary tract infection / pyelonephritis during his admission last month. He completed therapy without any major problems.     He reported bilateral renal angle pain over the past week. Overnight, the pain increased in intensity. It was graded at 9/10 at its worst and was non-radiating. There was no history of fever , chills , chest pain , nausea or vomiting . He reported dysuria. There was no history of hematuria. He presented to the ER at [facility] today for an evaluation. He was admitted for further management. His K was 4.3 with a WBC of 6.0. [facility]  Database :      1/3/2017 --> Marked bilateral hydronephrosis                       Bilateral Hydroureter                       Bladder --> small bladder with irregular contour and lumen. 8/28/2019  --> Renal Cortical Thinning bilaterally with hydronephrosis that is severe on the right and left. 9/30/2019 ---> Bilateral Extensive Hydronephrosis and thin cortex with dilated ureters down to the bladder. Bladder -> abnormal multiloculated-type configuration similar to prior CT. Past Medical Hx:   Past Medical History:   Diagnosis Date    Acute renal failure (ARF) ([facility] [address] 7/30/2015    Chronic kidney disease     pt on hemodialysis d/t reflux    Congenital hydroureteronephrosis     Crohn disease ([facility] [address]     DVT (deep venous thrombosis) ([facility]          ESRD (end stage renal disease) ([facility] [address]     on HD [id number]    Gastrointestinal disorder     Chron's    VUR (vesicoureteric reflux)         Past Surgical Hx:     Past Surgical History:   Procedure Laterality Date    HX OTHER SURGICAL  Pt has a filter for DVT's    HX OTHER SURGICAL      Marquis placed 3 weeks ago    HX VASCULAR ACCESS  1/14/14    CREATION LEFT UPPER ARM ARTERIO VENOUS GRAFT   (7mm PTFE)     Lap --> colectomy --> 2009 (to address Crohn's)    Medications:  Prior to Admission medications    Medication Sig Start Date End Date Taking? Authorizing Provider   ergocalciferol (ERGOCALCIFEROL) 50,000 unit capsule Take 50,000 Units by mouth every Monday.    Yes Provider, Historical   b complex-vitamin c-folic acid (NEPHROCAPS) 1 mg capsule Take 1 Cap by mouth daily. 9/6/19  Yes Kat Ahmadi MD   epoetin tin-epbx (RETACRIT) 4,000 unit/mL injection 1 mL by SubCUTAneous route DIALYSIS MON, WED & FRI. Indications: anemia due to kidney failure, method of removing waste/poison from blood with dialysis 9/6/19  Yes Kat Ahmadi MD   sevelamer carbonate (RENVELA) 800 mg tab tab Take 1 Tab by mouth three (3) times daily (with meals). 9/6/19  Yes Kat Ahmadi MD   iron yvgwgd-G-N07-Ca-suc-stoma (CHROMAGEN) tablet Take 1 Tab by mouth daily. 9/6/19   Kat Ahmadi MD       Allergies   Allergen Reactions    Codeine Nausea and Vomiting    Iodinated Contrast Media Itching    Shellfish Derived Hives     And shrimp       Social Hx:     He lives with his girlfriend. He has children. He works as a Cook. Family History   Problem Relation Age of Onset    Heart Disease Other     Kidney Disease Other         kidney stones    Diabetes Mother     Hypertension Mother        Review of Systems:      General - neg for fever , chills ,weight loss    ENT - neg for sore throat    CVS - neg for chest pain    RS - neg for hemoptysis    GI - neg for nausea , vomiting    Renal - Dysuria - yes               Neg for hematuria    Heme - neg for easy bruising    Derm - neg for pruritus    CNS - neg for headache , syncope    Eye - neg for diplopia       Objective:    Vitals:    Vitals:    09/30/19 0647 09/30/19 1153 09/30/19 1232   BP: 121/81 130/84 102/59   Pulse: 61 65 (!) 50   Resp: 18 20 18   Temp: 98.7 °F (37.1 °C) 98 °F (36.7 °C) 98 °F (36.7 °C)   SpO2: 100% 100% 100%     I&O's:  No intake/output data recorded. Visit Vitals  /59 (BP 1 Location: Left arm, BP Patient Position: At rest)   Pulse (!) 50   Temp 98 °F (36.7 °C)   Resp 18   SpO2 100%       Physical Exam:      Seen in Room 511 . He was sitting at the edge of the bed quite comfortably.     General:Alert, No distress    HEENT:  Sclerae without icterus     Lungs : Clears to auscultation , no wheezes, rales or rhonchi    CVS:  No S3 gallop , No pericardial rub    Abdomen:  Lap scar. Soft , non-tender    Renal Angles : Bilateral Renal Angle tenderness. Extremities: No upper extremity oedema                          R. Arm ---> AV shunt ---> bruit present.   .    Neurologic:  Alert and Oriented    Psych: normal affect        Laboratory Results:    Lab Results   Component Value Date    BUN 45 (H) 09/30/2019     09/30/2019    K 4.3 09/30/2019     09/30/2019    CO2 28 09/30/2019       Lab Results   Component Value Date    BUN 45 (H) 09/30/2019    BUN 38 (H) 09/03/2019    BUN 46 (H) 09/01/2019    BUN 70 (H) 08/31/2019    BUN 93 (H) 08/30/2019    K 4.3 09/30/2019    K 4.3 09/03/2019    K 4.0 09/01/2019    K 4.0 08/31/2019    K 4.2 08/30/2019       Lab Results   Component Value Date    WBC 6.0 09/30/2019    RBC 3.59 (L) 09/30/2019    HGB 10.4 (L) 09/30/2019    HCT 34.2 (L) 09/30/2019    MCV 95.3 09/30/2019    MCH 29.0 09/30/2019    RDW 14.6 (H) 09/30/2019     09/30/2019       Lab Results   Component Value Date    PHOS 5.5 (H) 08/30/2019       Urine dipstick:   Lab Results   Component Value Date/Time    Color YELLOW/STRAW 09/30/2019 07:11 AM    Appearance CLEAR 09/30/2019 07:11 AM    Specific gravity 1.010 09/30/2019 07:11 AM    Specific gravity 1.008 08/28/2019 01:02 PM    pH (UA) 7.0 09/30/2019 07:11 AM    Protein 100 (A) 09/30/2019 07:11 AM    Glucose NEGATIVE  09/30/2019 07:11 AM    Ketone NEGATIVE  09/30/2019 07:11 AM    Bilirubin NEGATIVE  09/30/2019 07:11 AM    Urobilinogen 0.2 09/30/2019 07:11 AM    Nitrites NEGATIVE  09/30/2019 07:11 AM    Leukocyte Esterase LARGE (A) 09/30/2019 07:11 AM    Epithelial cells FEW 09/30/2019 07:11 AM    Bacteria 1+ (A) 09/30/2019 07:11 AM    WBC >100 (H) 09/30/2019 07:11 AM    RBC 10-20 09/30/2019 07:11 AM       I have reviewed the following: Labs       Care Plan discussed with:  Mr. Dorinda Bush , Dr. Zandra [place] Acute Dialysis team    Chart reviewed. Thank you for allowing us to participate in the care of this patient. We will follow patient. Please dont hesitate to call with any questions    Chetan Godwin MD  9/30/2019    Drake Sydney Marco Antonio  [facility]  Phone - [phone]   Fax - [phone]  [email or web address]

## 2019-09-30 NOTE — ROUTINE PROCESS
TRANSFER - OUT REPORT: 
 
Verbal report given to Kari JAMES(name) on Vanessa Jerry  being transferred to  bed 2(unit) for routine progression of care Report consisted of patients Situation, Background, Assessment and  
Recommendations(SBAR). Information from the following report(s) SBAR, Kardex, ED Summary, [facility] and Recent Results was reviewed with the receiving nurse. Lines:  
Peripheral IV 09/30/19 Left Forearm (Active) Opportunity for questions and clarification was provided. Patient transported with: 
 BookBag

## 2019-09-30 NOTE — H&P
History and Physical  Primary Care Provider: Other, MD Lui    CC: bilateral flank pain     Subjective:     36year old male with past medical history ESRD, noncompliance, presenting to [facility] with worsening bilateral flank pain for 1 week. Has been using extra strength tylenol without relief. Pain increased and unable to sleep. Came to ED for further evaluation. Pain rated 7/10, non-radiating. Was given morphine in ED, minimal relief. Patient recently admitted for left flank pain, treated for suspected pylonephritis. Cultures negative. Patient improved with antibiotics and supportive care. He was discharge home and initiated on outpatient dialysis at [facility], [facility]. States compliant with dialysis, no sessions missed. Previous documentation of noncompliance in prior years. In the ED, urinalysis with large leukocyte esterase, WBC, bacteria +1. CT with bilateral hydronephrosis. Review of Systems:    A comprehensive review of systems was negative except for that written in the History of Present Illness. Past Medical History:   Diagnosis Date    Acute renal failure (ARF) ([facility] 7/30/2015    Chronic kidney disease     pt on hemodialysis d/t reflux    Congenital hydroureteronephrosis     Crohn disease ([facility]     [facility] ([facility]          ESRD (end stage renal disease) ([facility] [facility]     on HD 8412-3457    Gastrointestinal disorder     Chron's    VUR (vesicoureteric reflux)       Past Surgical History:   Procedure Laterality Date    HX OTHER SURGICAL  Pt has a filter for DVT's    HX OTHER SURGICAL      Marquis placed 3 weeks ago    HX VASCULAR ACCESS  1/14/14    CREATION LEFT UPPER ARM ARTERIO VENOUS GRAFT   (7mm PTFE)     Prior to Admission medications    Medication Sig Start Date End Date Taking? Authorizing Provider   ergocalciferol (ERGOCALCIFEROL) 50,000 unit capsule Take 50,000 Units by mouth every Monday.    Yes Provider, Historical   b complex-vitamin c-folic acid (NEPHROCAPS) 1 mg capsule Take 1 Cap by mouth daily. 9/6/19  Yes Germaine Aden MD   epoetin tin-epbx (RETACRIT) 4,000 unit/mL injection 1 mL by SubCUTAneous route DIALYSIS MON, WED & FRI. Indications: anemia due to kidney failure, method of removing waste/poison from blood with dialysis 9/6/19  Yes Germaine Aden MD   sevelamer carbonate (RENVELA) 800 mg tab tab Take 1 Tab by mouth three (3) times daily (with meals). 9/6/19  Yes Germaine Aden MD   iron ebptet-X-O18-Ca-suc-stoma (CHROMAGEN) tablet Take 1 Tab by mouth daily. 9/6/19   Germaine Aden MD     Allergies   Allergen Reactions    Codeine Nausea and Vomiting    Iodinated Contrast Media Itching    Shellfish Derived Hives     And shrimp      Family History   Problem Relation Age of Onset    Heart Disease Other     Kidney Disease Other         kidney stones    Diabetes Mother     Hypertension Mother         SOCIAL HISTORY:  Patient resides at home  Patient ambulates with independence. Smoking history: past history  Alcohol history: denies        Objective:       Physical Exam:   Visit Vitals  /59 (BP 1 Location: Left arm, BP Patient Position: At rest)   Pulse (!) 50   Temp 98 °F (36.7 °C)   Resp 18   SpO2 100%     General appearance: alert, cooperative, no distress, appears stated age  Head: Normocephalic, without obvious abnormality, atraumatic  Neck: supple, symmetrical, trachea midline, no adenopathy, thyroid  Lungs: clear to auscultation bilaterally  : tender to palpation bilateral flanks   Heart: regular rate and rhythm, S1, S2 normal, no murmur, click, rub or gallop  Abdomen: soft, non-tender. Bowel sounds normal. No masses,  no organomegaly  Extremities: extremities normal, atraumatic, no cyanosis or edema  Pulses: 2+ and symmetric  Lymph nodes: Cervical, supraclavicular, and axillary nodes normal.  Neurologic: Grossly normal    Data Review:    All diagnostic labs and studies have been reviewed. CT:  IMPRESSION:  1. Bilateral hydronephrosis with marked thinning of the renal cortex similar to  prior exams dating back to 1/3/17. Ureters are dilated down to the bladder which  is again noted to be multilobulated contour with prominence of the prostatic  urethra and other chronic findings which can be correlated with previous  urologic history. 2. IVC filter again incidentally noted as described above. 3. Evidence of prior surgery in the area of the cecum/terminal ileum also again  noted. 4. Minimal left pleural effusion and mild atelectasis at the lung bases  posteriorly. Assessment:     Active Problems:    Bilateral hydronephrosis (9/30/2019)        Plan:     Bilateral hydronephrosis with bilateral flank pain:   -pain acute on chronic  -consult urology  -unclear if related to underlying infection  --patient treated with UTI/pyelonephritis in past but cultures negative.    --will hold antibiotics for now, follow cultures  --no WBC/evidence of sepsis   -pain control     ESRD, on HD:   -consult nephrology, spoke with Dr. Ko Cardona  -restart home nephro medications    Anemia, normocytic:   Stable    Elevated lipase:   -elevated during past hospitalizations, does not meet criteria for pancreatitis     DVT: heparin  Code: full                 Signed By: Perez Hartley DO     September 30, 2019

## 2019-09-30 NOTE — PROGRESS NOTES
Admission Medication Reconciliation:    Information obtained from:  yes  RxQuery data available¹:  YES    Comments/Recommendations: All medications/allergies have been reviewed and updated; last medication administration times reviewed and recorded. The patient reports he has not taken any medication prior to presenting to ED. Note- patient not taking Chromagen due to insurance issue. Changes made to Prior to Admission (PTA) Medication List:   ?   Medications Added:   - None   ? Medications Changed:   - Updated day of week for ergocalciferol  ? Medications Removed:   - None     ¹RxQuery pharmacy benefit data reflects medications filled and processed through the patient's insurance, however   this data does NOT capture whether the medication was picked up or is currently being taken by the patient. Allergies:  Codeine; Iodinated contrast media; and Shellfish derived    Significant PMH/Disease States:   Past Medical History:   Diagnosis Date    Acute renal failure (ARF) ([facility] [facility] 2015    Chronic kidney disease     pt on hemodialysis d/t reflux    Congenital hydroureteronephrosis     Crohn disease ([facility] [address]     DVT (deep venous thrombosis) ([facility])          ESRD (end stage renal disease) ([facility] [address]     on HD 0440-2373    Gastrointestinal disorder     Chron's    VUR (vesicoureteric reflux)        Chief Complaint for this Admission:    Chief Complaint   Patient presents with    Flank Pain       Prior to Admission Medications:   Prior to Admission Medications   Prescriptions Last Dose Informant Patient Reported? Taking?   b complex-vitamin c-folic acid (NEPHROCAPS) 1 mg capsule 2019 at Unknown time  No Yes   Sig: Take 1 Cap by mouth daily. epoetin tin-epbx (RETACRIT) 4,000 unit/mL injection 2019 at Unknown time  No Yes   Si mL by SubCUTAneous route DIALYSIS MON, WED & FRI.  Indications: anemia due to kidney failure, method of removing waste/poison from blood with dialysis   ergocalciferol (ERGOCALCIFEROL) 50,000 unit capsule 9/23/2019  Yes Yes   Sig: Take 50,000 Units by mouth every Monday. iron fzajvu-Q-W26-Ca-suc-stoma (CHROMAGEN) tablet Not Taking at Unknown time  No No   Sig: Take 1 Tab by mouth daily. sevelamer carbonate (RENVELA) 800 mg tab tab 9/29/2019 at Unknown time  No Yes   Sig: Take 1 Tab by mouth three (3) times daily (with meals). Facility-Administered Medications: None           Thank you for allowing pharmacy to participate in the coordination of this patient's care. If you have any other questions, please contact the medication reconciliation pharmacist at x [phone]. Nasim Juárez Pharm. D., BCPS

## 2019-09-30 NOTE — PROGRESS NOTES
Bedside and Verbal shift change report given to Demetrius Barnes (oncoming nurse) by Jessi Landon RN (offgoing nurse). Report included the following information SBAR, Kardex, ED Summary, MAR and Med Rec Status.

## 2019-09-30 NOTE — ED TRIAGE NOTES
Patient presents to the emergency department reporting right sided flank pain. Patient reports the pain has been ongoing for about a week. Patient states he was admitted about two weeks ago and was placed on dialysis. Patient reports having had flank pain prior to being placed back on dialysis. Patient reports he has flank pain when urinating.

## 2019-09-30 NOTE — CONSULTS
New Urology Consult Note    Service Providing Consult: Urology    Assessment:    Patient is a 36 y.o. male with Crohn's, history of chronic hydroureteronephrosis in setting of VUR, DVT with IVC filter present, ESRD on HD, noncompliance per report. Presents with bilateral flank pain, L > R. Recent hospitalization for pyelonephritis. Significant renal parenchymal thinning on CT imaging. Unlikely that decompression of urinary tract would improve renal function or have any effect on requirement of hemodialysis, however possible that chronic dilation is contributing to pain. Urinalysis suggestive of infection as well vs intrarenal (ATN, AIN?) cause with WBC casts, pyuria. Patient not interested in [facility]. I feel as though ureteral stents could in fact worsen the situation. Best treatment likely bladder decompression, ensuring complete bladder emptying. Recommendations:  1. Trend PVRs over next 12-24 hours  2. Ultimately patient would likely benefit from indwelling catheterization for a few days to decompress the urinary system then transition to in & out catheterization. He would like more time to think about this prior to agreeing with Bhandari catheter. 3. Long term goal from renal function standpoint will likely require renal transplantation and ileal conduit as bladder is likely hostile. 4. Agree with nephrology consultation  5. Antibiotics per medicine teams    Thank you for this consult. Please contact [facility] with any further questions/concerns. Hortencia Maddox MD      HPI -    Reason for Consult:  Hydronephrosis, back pain    Rosa Elena Massey is seen in consultation for reasons noted above at the request of Leyla Weaver DO. This is a 35 yo patient with a history of Crohn's, DVT, VUR, congenital hydronephrosis. He is a bit of a poor historian. CT imaging suggests posterior urethral valves with a dilated prostatic urethra (?), however patient unaware of this diagnosis.  He has ESRD on HD, history of noncompliance. He was recently admitted for worsening bilateral flank pain. Nocturia x4. Still makes a good amount of urine by report. No fevers. AFVSS. No leukocytosis. Urinalysis suggestive of possible infection vs intrarenal etiology (large leuk est, >100 WBC/HPF, 1+ bacteria, WBC casts). Cr 9.47. Hospitalized earlier in Sept 2019 with diagnosis of pyelonephritis. Seen in the past by [facility] with Dr. Justin Smith 7/2015, Dr. Ilir Curtis 9/2013.        Past Medical History:  Past Medical History:   Diagnosis Date    Acute renal failure (ARF) ([facility] 7/30/2015    Chronic kidney disease     pt on hemodialysis d/t reflux    Congenital hydroureteronephrosis     Crohn disease ([facility] [address] [facility]     DVT (deep venous thrombosis) ([facility])          ESRD (end stage renal disease) ([facility] [address] [facility]     on HD 8290-3812    Gastrointestinal disorder     Chron's    VUR (vesicoureteric reflux)        Past Surgical History:   Past Surgical History:   Procedure Laterality Date    HX OTHER SURGICAL  Pt has a filter for DVT's    HX OTHER SURGICAL      Marquis placed 3 weeks ago    HX VASCULAR ACCESS  1/14/14    CREATION LEFT UPPER ARM ARTERIO VENOUS GRAFT   (7mm PTFE)       Medication:  Current Facility-Administered Medications   Medication Dose Route Frequency Provider Last Rate Last Dose    sodium chloride (NS) flush 5-40 mL  5-40 mL IntraVENous Q8H MIKAEL Gonzáles M, DO        sodium chloride (NS) flush 5-40 mL  5-40 mL IntraVENous PRN Roger MARCIAL DO        acetaminophen (TYLENOL) tablet 650 mg  650 mg Oral Q4H PRN Gracy Mendez DO        [facility]ron [facility] injection 4 mg  4 mg IntraVENous Q4H PRN Roger MARCIAL DO        heparin (porcine) injection 5,000 Units  5,000 Units SubCUTAneous Q8H Gracy Gonzáles DO        [START ON 10/1/2019] B complex-vitaminC-folic acid (NEPHROCAP) cap  1 Cap Oral DAILY Roger MARCIAL DO        ergocalciferol capsule 50,000 Units  50,000 Units Oral every Monday Kasey Lozano Gracy MARCIAL DO   50,000 Units at 09/30/19 1400    [START ON 10/1/2019] iron cqukyx-G-Y38-Ca-suc-stoma (CHROMAGEN) 70 mg-150 mg-10 mcg-2 mg-75 mg tablet 1 Tab  1 Tab Oral DAILY Tillman Peabody M, DO        sevelamer carbonate (RENVELA) tab 800 mg  800 mg Oral TID WITH MEALS Gracy Gonzáles, DO   800 mg at 09/30/19 1400    [START ON 10/1/2019] epoetin tin-epbx (RETACRIT) injection 4,000 Units  4,000 Units SubCUTAneous DIALYSIS TUE, THU & SAT Michael Greenberg MD        oxyCODONE IR (ROXICODONE) tablet 5 mg  5 mg Oral Q4H PRN Tillman Peabody M, DO   5 mg at 09/30/19 1400    calcium carbonate (TUMS) chewable tablet 400 mg [elemental]  400 mg Oral TID WITH MEALS Michael Greenberg MD   400 mg at 09/30/19 1400       Allergies: Allergies   Allergen Reactions    Codeine Nausea and Vomiting    Iodinated Contrast Media Itching    Shellfish Derived Hives     And shrimp       Social History:  Social History     Tobacco Use    Smoking status: Former Smoker    Smokeless tobacco: Never Used    Tobacco comment: quit about a year ago    Substance Use Topics    Alcohol use: No    Drug use: No       Family History  Family History   Problem Relation Age of Onset    Heart Disease Other     Kidney Disease Other         kidney stones    Diabetes Mother     Hypertension Mother        Review of Systems  10 systems were reviewed and are negative except as noted specifically in the HPI. Objective     Vital signs in last 24 hours:  Visit Vitals  /59 (BP 1 Location: Left arm, BP Patient Position: At rest)   Pulse (!) 50   Temp 98 °F (36.7 °C)   Resp 18   SpO2 100%       Intake/Output last 3 shifts:  Date 09/29/19 1900 - 09/30/19 0659(Not Admitted) 09/30/19 0700 - 10/01/19 0659   Shift 9579-9122 24 Hour Total 7831-7678 0703-9750 24 Hour Total   INTAKE   P.O.    320 320     P. O.    320 320   Shift Total    320 320   OUTPUT   Urine    200 200     Urine Voided    200 200     Urine Occurrence(s)   2 x  2 x   Shift Total    200 200   NET    120 120   Weight (kg)              Physical Exam  General: NAD, A&O, resting, appropriate  HEENT: NC/AT, EOMI, MMM  Pulmonary: Normal work of breathing on RA  Cardiovascular: Regular rate & rhythm, HDS, adequate peripheral perfusion  Abdomen: soft, NTTP, nondistended, no suprapubic fullness or tenderness, infraumbilical midline well healed surgical incision  : no Bhandari present, bilateral CVA tenderness  Extremities: warm and well perfused  Neuro: Appropriate, no focal neurological deficits    Most Recent Labs:  Lab Results   Component Value Date/Time    WBC 6.0 09/30/2019 07:11 AM    Hemoglobin (POC) 12.2 10/22/2015 03:25 PM    HGB 10.4 (L) 09/30/2019 07:11 AM    Hematocrit (POC) 38 09/21/2018 07:13 AM    HCT 34.2 (L) 09/30/2019 07:11 AM    PLATELET 591 44/78/5492 07:11 AM    MCV 95.3 09/30/2019 07:11 AM        Lab Results   Component Value Date/Time    Sodium 139 09/30/2019 07:11 AM    Potassium 4.3 09/30/2019 07:11 AM    Chloride 105 09/30/2019 07:11 AM    CO2 28 09/30/2019 07:11 AM    Anion gap 6 09/30/2019 07:11 AM    Glucose 112 (H) 09/30/2019 07:11 AM    BUN 45 (H) 09/30/2019 07:11 AM    Creatinine 9.47 (H) 09/30/2019 07:11 AM    BUN/Creatinine ratio 5 (L) 09/30/2019 07:11 AM    GFR est AA 8 (L) 09/30/2019 07:11 AM    GFR est non-AA 6 (L) 09/30/2019 07:11 AM    Calcium 6.5 (L) 09/30/2019 07:11 AM    Bilirubin, total 0.4 09/30/2019 07:11 AM    AST (SGOT) 19 09/30/2019 07:11 AM    Alk.  phosphatase 95 09/30/2019 07:11 AM    Protein, total 8.0 09/30/2019 07:11 AM    Albumin 3.2 (L) 09/30/2019 07:11 AM    Globulin 4.8 (H) 09/30/2019 07:11 AM    A-G Ratio 0.7 (L) 09/30/2019 07:11 AM    ALT (SGPT) 15 09/30/2019 07:11 AM        No results found for: PSA, PSA2, PSAR1, PSA1, PSAR2, PSA3, PSAR3, [id number], [id number], PSALT, 42012, PSAEXT     COAGS:  No results found for: APTT, PTP, INR, INREXT    Lab Results   Component Value Date/Time    Hemoglobin A1c (POC) 5.0 08/28/2019 03:43 PM        Lab Results Component Value Date/Time    Troponin-I, Qt. <0.05 2019 02:30 PM          Urine/Blood Cultures:  Results     Procedure Component Value Units Date/Time    URINE CULTURE HOLD SAMPLE [[id number]] Collected:  19    Order Status:  Completed Specimen:  Urine from Serum Updated:  19     Urine culture hold       URINE ON HOLD IN MICROBIOLOGY DEPT FOR 3 DAYS. IF UNPRESERVED URINE IS SUBMITTED, IT CANNOT BE USED FOR ADDITIONAL TESTING AFTER 24 HRS, RECOLLECTION WILL BE REQUIRED. IMAGIN. CT A/P Wo Contrast (19) -   IMPRESSION  IMPRESSION:  1. Bilateral hydronephrosis with marked thinning of the renal cortex similar to  prior exams dating back to 1/3/17. Ureters are dilated down to the bladder which  is again noted to be multilobulated contour with prominence of the prostatic  urethra and other chronic findings which can be correlated with previous  urologic history. 2. IVC filter again incidentally noted as described above. 3. Evidence of prior surgery in the area of the cecum/terminal ileum also again  noted. 4. Minimal left pleural effusion and mild atelectasis at the lung bases  posteriorly.

## 2019-09-30 NOTE — PROGRESS NOTES
9/30/19 -   REJI:  - CM notes patient's transfer from ED  - Patient is a [facility] patient, with admission 8/88-9/6 for UTI.   - Patient is an HD patient, T-T-S at 11:00 at [facility]  - Patient has a urology consult pending today, 9/30  - Patient's cultures are pending  - Patient to have HD tomorrow, 10/1  CRM: Tru Corley, MPH, [address] [place] [address]; Z: [phone]

## 2019-10-01 VITALS
DIASTOLIC BLOOD PRESSURE: 82 MMHG | HEART RATE: 58 BPM | RESPIRATION RATE: 12 BRPM | TEMPERATURE: 98.1 F | BODY MASS INDEX: 26.39 KG/M2 | SYSTOLIC BLOOD PRESSURE: 125 MMHG | WEIGHT: 158.6 LBS | OXYGEN SATURATION: 97 %

## 2019-10-01 LAB
ANION GAP SERPL CALC-SCNC: 10 MMOL/L (ref 5–15)
BUN SERPL-MCNC: 53 MG/DL (ref 6–20)
BUN/CREAT SERPL: 5 (ref 12–20)
CALCIUM SERPL-MCNC: 6.4 MG/DL (ref 8.5–10.1)
CHLORIDE SERPL-SCNC: 106 MMOL/L (ref 97–108)
CO2 SERPL-SCNC: 22 MMOL/L (ref 21–32)
CREAT SERPL-MCNC: 10.1 MG/DL (ref 0.7–1.3)
GLUCOSE SERPL-MCNC: 105 MG/DL (ref 65–100)
HCT VFR BLD AUTO: 32.2 % (ref 36.6–50.3)
HGB BLD-MCNC: 10 G/DL (ref 12.1–17)
POTASSIUM SERPL-SCNC: 4.3 MMOL/L (ref 3.5–5.1)
SODIUM SERPL-SCNC: 138 MMOL/L (ref 136–145)

## 2019-10-01 PROCEDURE — 51798 US URINE CAPACITY MEASURE: CPT

## 2019-10-01 PROCEDURE — 74011250637 HC RX REV CODE- 250/637: Performed by: INTERNAL MEDICINE

## 2019-10-01 PROCEDURE — 85018 HEMOGLOBIN: CPT

## 2019-10-01 PROCEDURE — 90935 HEMODIALYSIS ONE EVALUATION: CPT

## 2019-10-01 PROCEDURE — 99218 HC RM OBSERVATION: CPT

## 2019-10-01 PROCEDURE — 36415 COLL VENOUS BLD VENIPUNCTURE: CPT

## 2019-10-01 PROCEDURE — 80048 BASIC METABOLIC PNL TOTAL CA: CPT

## 2019-10-01 RX ORDER — CEFUROXIME AXETIL 250 MG/1
250 TABLET ORAL EVERY OTHER DAY
Qty: 4 TAB | Refills: 0 | Status: SHIPPED | OUTPATIENT
Start: 2019-10-03 | End: 2019-10-01

## 2019-10-01 RX ORDER — CEFUROXIME AXETIL 250 MG/1
250 TABLET ORAL EVERY OTHER DAY
Status: DISCONTINUED | OUTPATIENT
Start: 2019-10-01 | End: 2019-10-01 | Stop reason: HOSPADM

## 2019-10-01 RX ORDER — TRAMADOL HYDROCHLORIDE 50 MG/1
25 TABLET ORAL
Qty: 6 TAB | Refills: 0 | Status: SHIPPED | OUTPATIENT
Start: 2019-10-01 | End: 2019-10-08

## 2019-10-01 RX ORDER — CEFUROXIME AXETIL 250 MG/1
250 TABLET ORAL EVERY OTHER DAY
Qty: 4 TAB | Refills: 0 | OUTPATIENT
Start: 2019-10-03 | End: 2019-12-26

## 2019-10-01 RX ADMIN — ASCORBIC ACID, THIAMINE MONONITRATE,RIBOFLAVIN, NIACINAMIDE, PYRIDOXINE HYDROCHLORIDE, FOLIC ACID, CYANOCOBALAMIN, BIOTIN, CALCIUM PANTOTHENATE, 1 CAPSULE: 100; 1.5; 1.7; 20; 10; 1; 6000; 150000; 5 CAPSULE, LIQUID FILLED ORAL at 14:52

## 2019-10-01 RX ADMIN — OXYCODONE HYDROCHLORIDE 5 MG: 5 TABLET ORAL at 01:48

## 2019-10-01 RX ADMIN — CALCIUM CARBONATE (ANTACID) CHEW TAB 500 MG 400 MG: 500 CHEW TAB at 07:14

## 2019-10-01 RX ADMIN — Medication 1 TABLET: at 14:52

## 2019-10-01 RX ADMIN — SEVELAMER CARBONATE 800 MG: 800 TABLET, FILM COATED ORAL at 08:09

## 2019-10-01 RX ADMIN — CEFUROXIME AXETIL 250 MG: 250 TABLET, FILM COATED ORAL at 14:52

## 2019-10-01 NOTE — CONSULTS
Urology Consult Note    Service Providing Consult: Urology    Assessment:    Patient is a 36 y.o. male with Crohn's, history of chronic hydroureteronephrosis in setting of VUR, DVT with IVC filter present, ESRD on HD, noncompliance per report. Presents with bilateral flank pain, L > R. Recent hospitalization for pyelonephritis. Significant renal parenchymal thinning on CT imaging. Unlikely that decompression of urinary tract would improve renal function or have any effect on requirement of hemodialysis, however possible that chronic dilation vs infection is contributing to pain. Urinalysis suggestive of infection as well vs intrarenal (ATN, AIN?) cause with WBC casts, pyuria. Patient not interested in [facility]. I feel as though ureteral stents could in fact worsen the situation. Patient emptying bladder well with all PVRs <10cc. Recommendations:  1. Discontinue PVRs/bladder scans  2. Patient does not want to proceed with Bhandari catheter nor I&O catheterizations and with PVRs all <10cc this is not likely to help. Upper tract dilation felt to be chronic in nature and may not respond to catheterizations  3. Long term goal from renal function standpoint will likely require renal transplantation and ileal conduit or other urinary diversion as bladder is likely hostile - patient to follow up with [facility] PRN if transplant evaluation is pursued  4. Agree with nephrology consultation  5. Antibiotics per medicine teams. Urine culture pending. Thank you for this consult. Please contact [facility] with any further questions/concerns. We will be available, however would not recommend any further urologic workup during this hospitalization. Rachael Bonilla MD      Subjective: Thinks he voided well yesterday with no sensation of incomplete emptying. Comfortable, sleeping this morning. All PVRs <10cc.        Objective     Vital signs in last 24 hours:  Visit Vitals  /77 (BP 1 Location: Right arm, BP Patient Position: At rest)   Pulse (!) 56   Temp 98.1 °F (36.7 °C)   Resp 16   Wt 71.9 kg (158 lb 9.6 oz)   SpO2 99%   BMI 26.39 kg/m²       Intake/Output last 3 shifts:  Date 09/30/19 0700 - 10/01/19 0659 10/01/19 0700 - 10/02/19 0659   Shift 6359-5112 7819-1237 24 Hour Total 2360-5642 3539-3997 24 Hour Total   INTAKE   P.O.  320 320        P. O.  320 320      Shift Total(mL/kg)  320(4.4) 320(4.4)      OUTPUT   Urine  1250(1.4) 1250(0.7) 200  200     Urine Voided  1250 1250 200  200     Urine Occurrence(s) 2 x  2 x      Shift Total(mL/kg)  1250(17.4) 1250(17.4) 200(2.8)  200(2.8)   NET  -930 -930 -200  -200   Weight (kg)  71.9 71.9 71.9 71.9 71.9         Physical Exam  General: NAD, A&O, resting, appropriate  HEENT: NC/AT, EOMI, MMM  Pulmonary: Normal work of breathing on RA  Cardiovascular: Regular rate & rhythm, HDS, adequate peripheral perfusion  Abdomen: soft, NTTP, nondistended, no suprapubic fullness or tenderness, infraumbilical midline well healed surgical incision  : no Bhandari present, bilateral CVA tenderness  Extremities: warm and well perfused  Neuro: Appropriate, no focal neurological deficits    Most Recent Labs:  Lab Results   Component Value Date/Time    WBC 6.0 09/30/2019 07:11 AM    Hemoglobin (POC) 12.2 10/22/2015 03:25 PM    HGB 10.4 (L) 09/30/2019 07:11 AM    Hematocrit (POC) 38 09/21/2018 07:13 AM    HCT 34.2 (L) 09/30/2019 07:11 AM    PLATELET 966 75/25/8028 07:11 AM    MCV 95.3 09/30/2019 07:11 AM        Lab Results   Component Value Date/Time    Sodium 139 09/30/2019 07:11 AM    Potassium 4.3 09/30/2019 07:11 AM    Chloride 105 09/30/2019 07:11 AM    CO2 28 09/30/2019 07:11 AM    Anion gap 6 09/30/2019 07:11 AM    Glucose 112 (H) 09/30/2019 07:11 AM    BUN 45 (H) 09/30/2019 07:11 AM    Creatinine 9.47 (H) 09/30/2019 07:11 AM    BUN/Creatinine ratio 5 (L) 09/30/2019 07:11 AM    GFR est AA 8 (L) 09/30/2019 07:11 AM    GFR est non-AA 6 (L) 09/30/2019 07:11 AM    Calcium 6.5 (L) 09/30/2019 07:11 AM    Bilirubin, total 0.4 2019 07:11 AM    AST (SGOT) 19 2019 07:11 AM    Alk. phosphatase 95 2019 07:11 AM    Protein, total 8.0 2019 07:11 AM    Albumin 3.2 (L) 2019 07:11 AM    Globulin 4.8 (H) 2019 07:11 AM    A-G Ratio 0.7 (L) 2019 07:11 AM    ALT (SGPT) 15 2019 07:11 AM        No results found for: PSA, PSA2, PSAR1, [place], PSAR3, [id number], [id number], PSALT, 94659, PSAEXT     COAGS:  No results found for: APTT, PTP, INR, INREXT, INREXT    Lab Results   Component Value Date/Time    Hemoglobin A1c (POC) 5.0 2019 03:43 PM        Lab Results   Component Value Date/Time    Troponin-I, Qt. <0.05 2019 02:30 PM          Urine/Blood Cultures:  Results     Procedure Component Value Units Date/Time    URINE CULTURE HOLD SAMPLE [[id number]] Collected:  19    Order Status:  Completed Specimen:  Urine from Serum Updated:  19     Urine culture hold       URINE ON HOLD IN MICROBIOLOGY DEPT FOR 3 DAYS. IF UNPRESERVED URINE IS SUBMITTED, IT CANNOT BE USED FOR ADDITIONAL TESTING AFTER 24 HRS, RECOLLECTION WILL BE REQUIRED. CULTURE, URINE [[id number]] Collected:  19    Order Status:  Completed Specimen:  Urine from Clean catch Updated:  19 1828             IMAGIN. CT A/P Wo Contrast (19) -   IMPRESSION  IMPRESSION:  1. Bilateral hydronephrosis with marked thinning of the renal cortex similar to  prior exams dating back to 1/3/17. Ureters are dilated down to the bladder which  is again noted to be multilobulated contour with prominence of the prostatic  urethra and other chronic findings which can be correlated with previous  urologic history. 2. IVC filter again incidentally noted as described above. 3. Evidence of prior surgery in the area of the cecum/terminal ileum also again  noted. 4. Minimal left pleural effusion and mild atelectasis at the lung bases  posteriorly.

## 2019-10-01 NOTE — DIALYSIS
HD TRANSFER - OUT REPORT:    Verbal report given to Radha Spear RN on [facility] being transferred to [facility] for routine progression of care       Report consisted of patient's Situation, Background, Assessment and   Recommendations(SBAR). Information from the following report(s) Procedure Summary was reviewed with the receiving nurse. Method:  $$ Method: Hemodialysis (10/01/19 0939)    Fluid Removed  NET Fluid Removed (mL): 0 ml (10/01/19 1245)     Patient response to treatment:  Stable    End Time  Hemodialysis End Time: 5317 (10/01/19 1245)  If not documented, dialysis nurse to update post-dialysis row in HD/Filtration flowsheet     Medications /Volume expansion agents or Fluid boluses administered during treatment? no    Post-dialysis medication administration due?  no  Remind nurse to administer post-HD medication upon return to unit. Fistula hemostasis? yes      Opportunity for questions and clarification was provided.       Patient transported with: [employer] ([employer])

## 2019-10-01 NOTE — PROGRESS NOTES
Renal Progress Note    NAME:  Arely Chase   :   1985   MRN:   [id number]     Date/Time:  10/1/2019 9:03 AM      Assessment:   1. ESRD  2. Chronic Bilateral Hydronephrosis  3. VUR  4. Anemia in CKD  5. UTI (symptomatic)  6. Vitamin D Deficiency  7. Secondary Hyperparathyroidism  8. Hypocalcemia sec to # 6 + # 7       Plan:   1. Dialysis today (10/1/19). 2. Will start Ceftin --> 250 milligrams q 48 hours (pending the results of the urine culture). He is symptomatic. 3. Continue Ergo + TUMS. Subjective:         F/U ESRD - 10/1/1/19      Appreciated Urology's input. The PVRs were not significant. Dysuria remains an issue.          Review of Systems:  Y  N       Y  N  []         []          Fever/chills                                               []         []          Chest Pain  []         []          Cough                                                       []         []          Diarrhea   []         []          Sputum                                                     []         []          Constipation  []         []          SOB/NGUYEN                                                []         []          Nausea/Vomit  []         []          Abd Pain                                                    []         []          Tolerating PT  []         []          Dysuria                                                      []         []          Tolerating Diet     []        Unable to obtain  ROS due to  []        mental status change  []        sedated   []        intubated    Medications reviewed:  Current Facility-Administered Medications   Medication Dose Route Frequency    cefUROXime (CEFTIN) tablet 250 mg  250 mg Oral EVERY OTHER DAY    sodium chloride (NS) flush 5-40 mL  5-40 mL IntraVENous Q8H    sodium chloride (NS) flush 5-40 mL  5-40 mL IntraVENous PRN    acetaminophen (TYLENOL) tablet 650 mg  650 mg Oral Q4H PRN    ondansetron (ZOFRAN) injection 4 mg  4 mg IntraVENous Q4H PRN    heparin (porcine) injection 5,000 Units  5,000 Units SubCUTAneous Q8H    B complex-vitaminC-folic acid (NEPHROCAP) cap  1 Cap Oral DAILY    ergocalciferol capsule 50,000 Units  50,000 Units Oral every Monday    iron smqnef-F-F94-Ca-suc-stoma (CHROMAGEN) 70 mg-150 mg-10 mcg-2 mg-75 mg tablet 1 Tab  1 Tab Oral DAILY    sevelamer carbonate (RENVELA) tab 800 mg  800 mg Oral TID WITH MEALS    oxyCODONE IR (ROXICODONE) tablet 5 mg  5 mg Oral Q4H PRN    calcium carbonate (TUMS) chewable tablet 400 mg [elemental]  400 mg Oral TID WITH MEALS        Objective:   Vitals:  Visit Vitals  /77 (BP 1 Location: Right arm, BP Patient Position: At rest)   Pulse (!) 56   Temp 98.1 °F (36.7 °C)   Resp 16   Wt 71.9 kg (158 lb 9.6 oz)   SpO2 99%   BMI 26.39 kg/m²     Temp (24hrs), Av.2 °F (36.8 °C), Min:98 °F (36.7 °C), Max:98.8 °F (37.1 °C)      O2 Device: Room air    Last 24hr Input/Output:    Intake/Output Summary (Last 24 hours) at 10/1/2019 0903  Last data filed at 10/1/2019 0715  Gross per 24 hour   Intake 320 ml   Output 1450 ml   Net -1130 ml        PHYSICAL EXAM:      Seen in Room 511 . General:    Lying in bed comfortably. Head:   Normocephalic    Eyes:   No icterus. Lungs:   Clear to auscultation , no wheezes, rales or rhonchi. Heart:   No S 3  Gallop , No pericardial rub. Abdomen:   Non-tender, bowel sounds - normal.    Extremities: Right Arm ---> AV shunt ---> bruit appreciated. Psych:  Not anxious or agitated. Neurologic: Alert and responding . []        Telemetry Reviewed     []        NSR []        PAC/PVCs   []        Afib  []        Paced   []        NSVT   []        Bhandari []        NGT  []        Intubated on vent    Lab Data Reviewed:    No results found for this or any previous visit (from the past 24 hour(s)).     Total time spent with patient:  []        15   []        25   []        35   []         __ minutes    []        Critical Care Provided    Care Plan discussed with:        [x]        Patient   []        Family    []        Care Manager   []        Consultant/Specialist :      []          >50% of visit spent in counseling and coordination of care   (Discussed []        CODE status,  []        Care Plan, []        D/C Planning)    ___________________________________________________    Attending Physician: Jigar Webster MD

## 2019-10-01 NOTE — DIALYSIS
TRANSFER - IN REPORT:    Verbal report received from Pastor Costello RN on Amirah Sport  being received from  b for ordered procedure      Report consisted of patients Situation, Background, Assessment and   Recommendations(SBAR). Information from the following report(s) SBAR was reviewed with the receiving nurse. Opportunity for questions and clarification was provided. Assessment completed upon patients arrival to unit and care assumed.

## 2019-10-01 NOTE — DIALYSIS
[facility]  [phone]    Vitals   Pre   Post   Assessment   Pre   Post     Temp  Temp: 97.8 °F (36.6 °C) (10/01/19 0945)  98.3 LOC  Alert and oriented x 4 Alert and oriented   HR   Pulse (Heart Rate): (!) 48 (10/01/19 1000) 50 Lungs   Clear to auscultation  clear    B/P   BP: 125/61 (10/01/19 1000) 131/69 Cardiac   Regular, rate and rhythm RRR, bradycardia Dr. Eli Castro by to see patient   Resp   Resp Rate: 16 (10/01/19 1000) 16 Skin   Warm and dry   warm and dry    Pain level  Pain Intensity 1: 0 (10/01/19 0250) No verbal complaints Edema    None detected   None detected    Orders:    Duration:   Start:    0945 End:   1145 Total:   3   Dialyzer:    revaclear   K Bath:    2k    Ca Bath:    3.5 calcium   Na/Bicarb:    140/35   Target Fluid Removal:    0   Access     Type & Location:   Right upper arm graft, site without redness or warmth, cleansed with alcohol per policy and procedure, cannulated with two 15 gauge needles without incident.    Labs     Obtained/Reviewed   Critical Results Called   Date when labs were drawn-  Hgb-    HGB   Date Value Ref Range Status   09/30/2019 10.4 (L) 12.1 - 17.0 g/dL Final     K-    Potassium   Date Value Ref Range Status   09/30/2019 4.3 3.5 - 5.1 mmol/L Final     Ca-   Calcium   Date Value Ref Range Status   09/30/2019 6.5 (L) 8.5 - 10.1 MG/DL Final     Bun-   BUN   Date Value Ref Range Status   09/30/2019 45 (H) 6 - 20 MG/DL Final     Creat-   Creatinine   Date Value Ref Range Status   09/30/2019 9.47 (H) 0.70 - 1.30 MG/DL Final        Medications/ Blood Products Given     Name   Dose   Route and Time                     Blood Volume Processed (BVP):    67 Net Fluid   Removed:  0   Comments   Time Out Done: 3560  Primary Nurse Rpt Pre: Whitley Jiménez RN   Primary Nurse Rpt Post: Jose M Shah RN   Pt Education: infection control, remove dressing 12-24 hours after HD  Care Plan: continue current HD plan of care   Tx Summary:  6245 HD initiated as ordered  1030:  Dr. Alban Torrez notified of bradycardia pulse in the 40s apical, regular patient asymptomatic, UF zero, continue to monitor  1100  Dr. Alban Torrez notified of calcium alert value 6.4 patient is running on a 3.5 calcium bath no changes at this time. 1245 treatment completed all possible blood returned, hemostasis achieved 20 minutes, dressing dry and intact, dr. Alban Torrez in to see patient, report called to primary nurse. Admiting Diagnosis: hydronephrosis   Pt's previous clinic- [address] [facility]  Consent signed -  verified  [facility] Consent - obtained  Hepatitis Status- 09/10/19 antigen negative antibiodies >1000 [facility] #-   [id number]  Telemetry status-n/a  Pre-dialysis wt. -  n/a

## 2019-10-01 NOTE — DISCHARGE INSTRUCTIONS
Discharge Instructions       PATIENT ID: Aggie Cota  MRN: [id number]   YOB: 1985    DATE OF ADMISSION: 9/30/2019  6:50 AM    DATE OF DISCHARGE: 10/1/2019    PRIMARY CARE PROVIDER: Shefali Hood MD     ATTENDING PHYSICIAN: Selina Heaton MD  DISCHARGING PROVIDER: Horacio Ram MD    To contact this individual call [phone] and ask the  to page. If unavailable ask to be transferred the Adult Hospitalist Department. DISCHARGE DIAGNOSES bilateral hydronephrosis    CONSULTATIONS: IP CONSULT TO UROLOGY  IP CONSULT TO NEPHROLOGY    PROCEDURES/SURGERIES: * No surgery found *    PENDING TEST RESULTS:   At the time of discharge the following test results are still pending: none      FOLLOW UP APPOINTMENTS:   Follow-up Information     Follow up With Specialties Details Why Contact Info    [address] [place] [address] on 10/9/2019 APPOINTMENT AT 10:15AM [address]  [phone]    Ynes [address] MD Urology   Critical access hospital  [phone]             ADDITIONAL CARE RECOMMENDATIONS:   -follow up with urologist and PCP  -follow up with nephrologist  -Take ceftin alternate days for 4 doses. DIET: Renal Diet    ACTIVITY: Activity as tolerated    WOUND CARE: na    EQUIPMENT needed: na      DISCHARGE MEDICATIONS:   See Medication Reconciliation Form    · It is important that you take the medication exactly as they are prescribed. · Keep your medication in the bottles provided by the pharmacist and keep a list of the medication names, dosages, and times to be taken in your wallet. · Do not take other medications without consulting your doctor. NOTIFY YOUR PHYSICIAN FOR ANY OF THE FOLLOWING:   Fever over 101 degrees for 24 hours. Chest pain, shortness of breath, fever, chills, nausea, vomiting, diarrhea, change in mentation, falling, weakness, bleeding.  Severe pain or pain not relieved by medications. Or, any other signs or symptoms that you may have questions about.       DISPOSITION:  X  Home With:   OT  PT  HH  RN       SNF/Inpatient Rehab/LTAC    Independent/assisted living    Hospice    Other:         Signed:   Anthony Morfin MD  10/1/2019  12:51 PM

## 2019-10-01 NOTE — PROGRESS NOTES
10/1/19 -   REJI:  - Patient has declined vargas catheter   - Patient to no longer have PVRs/bladder scans  - Patient may require a kidney transplant  - ABX started today, 10/1  - Cultures are pending  - Patient to have HD today, 10/1, as normal HD schedule   CRM: Mendoza Osborne, MPH, [address] [facility]; Z: [phone]

## 2019-10-02 LAB
BACTERIA SPEC CULT: NORMAL
CC UR VC: NORMAL
SERVICE CMNT-IMP: NORMAL

## 2019-10-09 ENCOUNTER — OFFICE VISIT (OUTPATIENT)
Dept: INTERNAL MEDICINE CLINIC | Age: 34
End: 2019-10-09

## 2019-10-09 VITALS
WEIGHT: 156.3 LBS | TEMPERATURE: 96.7 F | RESPIRATION RATE: 16 BRPM | SYSTOLIC BLOOD PRESSURE: 122 MMHG | BODY MASS INDEX: 26.04 KG/M2 | DIASTOLIC BLOOD PRESSURE: 64 MMHG | HEIGHT: 65 IN | HEART RATE: 72 BPM | OXYGEN SATURATION: 100 %

## 2019-10-09 DIAGNOSIS — N18.6 ESRD (END STAGE RENAL DISEASE) ON DIALYSIS (HCC): Primary | ICD-10-CM

## 2019-10-09 DIAGNOSIS — N13.70 VESICOURETERAL REFLUX: ICD-10-CM

## 2019-10-09 DIAGNOSIS — K50.919 CROHN'S DISEASE WITH COMPLICATION, UNSPECIFIED GASTROINTESTINAL TRACT LOCATION (HCC): ICD-10-CM

## 2019-10-09 DIAGNOSIS — E55.9 VITAMIN D DEFICIENCY: ICD-10-CM

## 2019-10-09 DIAGNOSIS — Z99.2 ESRD (END STAGE RENAL DISEASE) ON DIALYSIS (HCC): Primary | ICD-10-CM

## 2019-10-09 DIAGNOSIS — N13.30 HYDROURETERONEPHROSIS: ICD-10-CM

## 2019-10-09 NOTE — PATIENT INSTRUCTIONS
Kidney Dialysis: Care Instructions  Your Care Instructions    Dialysis is a process that filters wastes from the blood when your kidneys can no longer do the job. It is not a cure, but it can help you live longer and feel better. It is a lifesaving treatment when you have kidney failure. Normal kidneys work 24 hours a day to clean wastes from your blood. Your kidneys are not able to do this job, so a process called dialysis will do some of the work for your kidneys. You and your doctor will decide which type of dialysis you should have. Peritoneal dialysis uses the lining of your belly (peritoneum) to filter your blood. You can do it at home, on a daily basis. Hemodialysis uses a man-made filter called a dialyzer to clean your blood. Most people need to go to a hospital or clinic 3 days a week for several hours each time. Sometimes hemodialysis can be done at home. It is normal to have questions about your treatment, and you have a right to know what is happening to you. Learning about dialysis can help you take an active role in your treatment. Dialysis does not cure kidney disease, but it can help you live longer and feel better. You will need to follow your diet and treatment schedule carefully. Follow-up care is a key part of your treatment and safety. Be sure to make and go to all appointments, and call your doctor if you are having problems. It's also a good idea to know your test results and keep a list of the medicines you take. What do you need to know about peritoneal dialysis? Peritoneal dialysis uses the lining of your belly (or peritoneal membrane) to filter your blood. Before you can begin peritoneal dialysis, your doctor will need to place a thin tube called a catheter in your belly. This is the dialysis access. · Peritoneal dialysis can be done at home or in any clean place. You may be able to do it while you sleep. · You can do it by yourself.  You do not have to rely on help from others. · You can do it at the times you choose as long as you do the right number of treatments. · It has to be done every day of the week. · Some people find it hard to do all the required steps. · It increases your chance for a serious infection of the lining of the belly (peritoneum). What do you need to know about hemodialysis? Hemodialysis uses a man-made membrane called a dialyzer to clean your blood. You are connected to the dialyzer by tubes attached to your blood vessels. Before you start hemodialysis, your doctor will create a site where the blood can flow in and out of your body during your dialysis sessions. This site is called the vascular access. It may be a fistula, made by connecting an artery and a vein. Or it may be a graft, which is a tube implanted under your skin. · Hemodialysis is done mainly by trained health workers who can watch for any problems. · It allows you to be in contact with other people having dialysis. This can help provide emotional support. · You can schedule your treatments in the evenings so you can keep working. · You may be able to do home hemodialysis, which gives you more control over your schedule. · It usually needs to be done on a set schedule 3 times a week. · It can cause side effects. The most common side effects are low blood pressure and muscle cramps. These can often be treated easily. · It requires needle sticks during every treatment, which bothers some people. Others get used to it and even do the needle sticks themselves. How can you care for yourself at home? · Be sure to have all of your dialysis sessions. Do not try to shorten or skip your sessions. You have a better chance of a longer and healthier life by getting your full treatment. · Your doctor or health care team will show you the steps you need to go through each day before, during, and after dialysis. Be sure to follow these steps.  If you do not understand a step, talk to your team.  · Your doctor and dietitian will help you design menus that follow your diet. Be sure to follow your diet guidelines. ? You will need to limit fluids and certain foods that contain salt (sodium), potassium, and phosphorus. ? You may need to follow a heart-healthy diet to keep the fat (cholesterol) in your blood under control. ? You may need higher levels of protein in your diet. · Your doctor may recommend certain vitamins. But do not take any other medicine, including over-the-counter medicines, vitamins, and herbal products, without talking to your doctor first.  · Do not smoke. Smoking raises your risk of many health problems, including more kidney damage. If you need help quitting, talk to your doctor about stop-smoking programs and medicines. These can increase your chances of quitting for good. · Do not take ibuprofen (Advil, Motrin), naproxen (Aleve), or similar medicines, unless your doctor tells you to. These medicines may make kidney problems worse. When should you call for help? Call your doctor now or seek immediate medical care if:    · You have a fever.     · You are dizzy or lightheaded, or you feel like you may faint.     · You are confused or cannot think clearly.     · You have new or worse nausea or vomiting.     · You have new or more blood in your urine.     · You have new swelling.    Watch closely for changes in your health, and be sure to contact your doctor if:    · You do not get better as expected. Where can you learn more? Go to http://roselyn-francia.info/. Enter O214 in the search box to learn more about \"Kidney Dialysis: Care Instructions. \"  Current as of: October 31, 2018  Content Version: 12.2  © 6245-3547 Healthwise, Incorporated. Care instructions adapted under license by [employer] (which disclaims liability or warranty for this information).  If you have questions about a medical condition or this instruction, always ask your healthcare professional. [place] [address] any warranty or liability for your use of this information. Medicines to Avoid With Kidney Disease: Care Instructions  Your Care Instructions    Kidney disease means that your kidneys are not able to get rid of waste from the blood. So they can't keep your body's fluids and chemicals in balance. Usually, the kidneys get rid of waste from the blood through the urine. And they balance the fluids in the body. When your kidneys don't work as they should, you have to be careful about some medicines. They may harm your kidneys. Your doctor may tell you not to take them. Or he or she may change the dose. Medicines for pain and swelling, such as ibuprofen (Advil or Motrin) or naproxen (Aleve), can cause harm. So can some antibiotics and antacids. And you need to be careful about some drugs that treat cancer, lower blood pressure, or get rid of water from the body. Some herbal products could cause harm too. Follow-up care is a key part of your treatment and safety. Be sure to make and go to all appointments, and call your doctor if you are having problems. It's also a good idea to know your test results and keep a list of the medicines you take. How can you care for yourself at home? · Tell your doctor all the prescription, herbal, or over-the-counter medicines you take. Do not take any new ones unless you talk to your doctor first.  · Do not take anti-inflammatory medicines. These include ibuprofen (Advil, Motrin) and naproxen (Aleve). You can use acetaminophen (Tylenol) for pain. · Do not take two or more pain medicines at the same time unless the doctor told you to. Many pain medicines have acetaminophen, which is Tylenol. Too much acetaminophen (Tylenol) can be harmful. · Tell all doctors and others who work with your health care that you have kidney disease. · Wear medical alert jewelry that lists your health problem.  You can buy this at most drugstores. Where can you learn more? Go to http://roselyn-francia.info/. Enter H676 in the search box to learn more about \"Medicines to Avoid With Kidney Disease: Care Instructions. \"  Current as of: October 31, 2018  Content Version: 12.2  © 5121-4478 [employer]. Care instructions adapted under license by [employer] (which disclaims liability or warranty for this information). If you have questions about a medical condition or this instruction, always ask your healthcare professional. [address] any warranty or liability for your use of this information. Learning About Vitamin D  Why is it important to get enough vitamin D? Your body needs vitamin D to absorb calcium. Calcium keeps your bones and muscles, including your heart, healthy and strong. If your muscles don't get enough calcium, they can cramp, hurt, or feel weak. You may have long-term (chronic) muscle aches and pains. If you don't get enough vitamin D throughout life, you have an increased chance of having thin and brittle bones (osteoporosis) in your later years. Children who don't get enough vitamin D may not grow as much as others their age. They also have a chance of getting a rare disease called rickets. It causes weak bones. Vitamin D and calcium are added to many foods. And your body uses sunshine to make its own vitamin D. How much vitamin D do you need? The [place] [employer] recommends that people ages 3 through 79 get 600 IU (international units) every day. Adults 71 and older need 800 IU every day. Blood tests for vitamin D can check your vitamin D level. But there is no standard normal range used by all laboratories. You're likely getting enough vitamin D if your levels are in the range of 20 to 50 ng/mL. How can you get more vitamin D? Foods that contain vitamin D include:  · Oblong, tuna, and mackerel.  These are some of the best foods to eat when you need to get more vitamin D.  · Cheese, egg yolks, and beef liver. These foods have vitamin D in small amounts. · Milk, soy drinks, orange juice, yogurt, margarine, and some kinds of cereal have vitamin D added to them. Some people don't make vitamin D as well as others. They may have to take extra care in getting enough vitamin D. Things that reduce how much vitamin D your body makes include:  · Dark skin, such as many  Americans have. · Age, especially if you are older than 72. · Digestive problems, such as Crohn's or celiac disease. · Liver and kidney disease. Some people who do not get enough vitamin D may need supplements. Are there any risks from taking vitamin D?  · Too much vitamin D:  ? Can damage your kidneys. ? Can cause nausea and vomiting, constipation, and weakness. ? Raises the amount of calcium in your blood. If this happens, you can get confused or have an irregular heart rhythm. · Vitamin D may interact with other medicines. Tell your doctor about all of the medicines you take, including over-the-counter drugs, herbs, and pills. Tell your doctor about all of your current medical problems. Where can you learn more? Go to http://roselyn-francia.info/. Enter [id number] in the search box to learn more about \"Learning About Vitamin D.\"  Current as of: November 7, 2018  Content Version: 12.2  © 5621-5411 [employer]. Care instructions adapted under license by [employer] (which disclaims liability or warranty for this information). If you have questions about a medical condition or this instruction, always ask your healthcare professional. [place] [address] any warranty or liability for your use of this information.

## 2019-10-09 NOTE — PROGRESS NOTES
Chief Complaint   Patient presents with   [facility] Follow Up     1. Have you been to the ER, urgent care clinic since your last visit? Hospitalized since your last visit? Yes Reason for visit: kidney disease    2. Have you seen or consulted any other health care providers outside of the [address] [place] [address] since your last visit? Include any pap smears or colon screening.  No

## 2019-10-12 NOTE — PROGRESS NOTES
Hospital Follow Up       Subjective:   HPI   36year old Mr. Georges Mart presents s/p hospital discharge 10/1/19 for bilateral hydroureteral nephrosis and received dialysis while hospitalized. He was sent on 8/28/19 to the hospital from this office needing to be dialyzed as he not done so out of fear. He is now connected with outpatient dialysis at [facility] on [address], nephrologist. He has dialysis Tuesday, Thursday and Saturday. Past Medical History:   Diagnosis Date    Acute renal failure (ARF) ([facility] 7/30/2015    Chronic kidney disease     pt on hemodialysis d/t reflux    Congenital hydroureteronephrosis     Crohn disease ([facility]     DVT [facility]deep venous thrombosis) ([facility])          ESRD (end stage renal disease) ([facility]     on HD [id number]    Gastrointestinal disorder     Chron's    VUR (vesicoureteric reflux)        Past Surgical History:   Procedure Laterality Date    HX OTHER SURGICAL  Pt has a filter for DVT's    HX OTHER SURGICAL      Marquis placed 3 weeks ago    HX VASCULAR ACCESS  1/14/14    CREATION LEFT UPPER ARM ARTERIO VENOUS GRAFT   (7mm PTFE)       Prior to Admission medications    Medication Sig Start Date End Date Taking? Authorizing Provider   cefUROXime (CEFTIN) 250 mg tablet Take 1 Tab by mouth every other day. 10/3/19  Yes Osvaldo Mcgraw MD   ergocalciferol (ERGOCALCIFEROL) 50,000 unit capsule Take 50,000 Units by mouth every Monday. Yes Provider, Historical   b complex-vitamin c-folic acid (NEPHROCAPS) 1 mg capsule Take 1 Cap by mouth daily. 9/6/19  Yes Germaine Aden MD   epoetin tin-epbx (RETACRIT) 4,000 unit/mL injection 1 mL by SubCUTAneous route DIALYSIS MON, WED & FRI. Indications: anemia due to kidney failure, method of removing waste/poison from blood with dialysis 9/6/19  Yes Germaine Aden MD   iron aydyqi-M-Z90-Ca-suc-stoma (CHROMAGEN) tablet Take 1 Tab by mouth daily.  9/6/19  Yes Germaine Aden MD   sevelamer carbonate (RENVELA) 800 mg tab tab Take 1 Tab by mouth three (3) times daily (with meals).  9/6/19  Yes Reyes Borrego MD        Allergies   Allergen Reactions    Codeine Nausea and Vomiting    Iodinated Contrast Media Itching    Shellfish Derived Hives     And shrimp        Social History     Socioeconomic History    Marital status: SINGLE     Spouse name: Not on file    Number of children: Not on file    Years of education: Not on file    Highest education level: Not on file   Occupational History    Not on file   Social Needs    Financial resource strain: Not on file    Food insecurity:     Worry: Not on file     Inability: Not on file    Transportation needs:     Medical: Not on file     Non-medical: Not on file   Tobacco Use    Smoking status: Former Smoker    Smokeless tobacco: Never Used    Tobacco comment: quit about a year ago    Substance and Sexual Activity    Alcohol use: No    Drug use: No    Sexual activity: Yes     Partners: Female   Lifestyle    Physical activity:     Days per week: Not on file     Minutes per session: Not on file    Stress: Not on file   Relationships    Social connections:     Talks on phone: Not on file     Gets together: Not on file     Attends Worship service: Not on file     Active member of club or organization: Not on file     Attends meetings of clubs or organizations: Not on file     Relationship status: Not on file    Intimate partner violence:     Fear of current or ex partner: Not on file     Emotionally abused: Not on file     Physically abused: Not on file     Forced sexual activity: Not on file   Other Topics Concern    Not on file   Social History Narrative    Not on file        Family History   Problem Relation Age of Onset    Heart Disease Other     Kidney Disease Other         kidney stones    Diabetes Mother     Hypertension Mother           Review of Systems   Constitutional: Negative for chills, diaphoresis, fever and malaise/fatigue. HENT: Negative for congestion, ear discharge, ear pain, hearing loss, nosebleeds, sinus pain and tinnitus. Eyes: Negative for blurred vision, pain, discharge and redness. Respiratory: Negative for cough, shortness of breath and wheezing. Cardiovascular: Negative for chest pain and palpitations. Gastrointestinal: Negative for abdominal pain, constipation, diarrhea, heartburn, nausea and vomiting. Genitourinary: Negative for dysuria. Musculoskeletal: Negative for myalgias. Fistula to both arms   Skin: Negative for rash. Neurological: Negative for dizziness, tingling, tremors, sensory change, speech change, focal weakness and headaches. Endo/Heme/Allergies: Negative for polydipsia. Does not bruise/bleed easily. Psychiatric/Behavioral: Negative for depression. Objective:     Vitals:    10/09/19 1021   BP: 122/64   Pulse: 72   Resp: 16   Temp: 96.7 °F (35.9 °C)   TempSrc: Oral   SpO2: 100%   Weight: 156 lb 4.8 oz (70.9 kg)   Height: 5' 5\" (1.651 m)   PainSc:   5   PainLoc: Back        Physical Exam   Constitutional: He is oriented to person, place, and time. He appears well-nourished. No distress. HENT:   Head: Normocephalic and atraumatic. Eyes: Pupils are equal, round, and reactive to light. Conjunctivae and EOM are normal.   Neck: Normal range of motion. Neck supple. No thyromegaly present. Cardiovascular: Normal rate, regular rhythm, normal heart sounds and intact distal pulses. Pulmonary/Chest: Effort normal and breath sounds normal. No respiratory distress. Abdominal: Soft. Bowel sounds are normal.   Musculoskeletal: Normal range of motion. Lymphadenopathy:     He has no cervical adenopathy. Neurological: He is alert and oriented to person, place, and time. Skin: Skin is warm and dry. He is not diaphoretic. Psychiatric: He has a normal mood and affect. Nursing note and vitals reviewed. Assessment/ Plan:       ICD-10-CM ICD-9-CM    1.  ESRD (end stage renal disease) on dialysis ([facility]) N18.6 585.6     Z99.2 V45.11    2. Vitamin D deficiency E55.9 268.9    3. Hydroureteronephrosis N13.30 591    4. Vesicoureteral reflux N13.70 593.70    5. Crohn's disease with complication, unspecified gastrointestinal tract location ([facility] K50.919 555.9         No orders of the defined types were placed in this encounter. Patient is doing well with dialysis and has overcome some of  his fear of the procedure. He was encouraged to be compliant to attending dialysis and f/u. I have reviewed the patient's medical history in detail and updated the computerized patient record. We had a prolonged discussion about these complex clinical issues and went over the various important aspects to consider. All questions were answered. Advised him to call back or return to office if symptoms do not improve, change in nature, or persist. RTO in about one month to f/u Vit D deficiency;ESRD/dialysis. He was given an after visit summary or informed of [employer] [facility] which includes patient instructions, diagnoses, current medications, & vitals. He expressed understanding with the diagnosis and plan and was given the opportunity to ask questions.     Cary Rodriguez, DNP

## 2019-11-06 ENCOUNTER — OFFICE VISIT (OUTPATIENT)
Dept: INTERNAL MEDICINE CLINIC | Age: 34
End: 2019-11-06

## 2019-11-08 ENCOUNTER — HOSPITAL ENCOUNTER (EMERGENCY)
Age: 34
Discharge: HOME | End: 2019-11-09
Attending: EMERGENCY MEDICINE | Admitting: EMERGENCY MEDICINE
Payer: MEDICARE

## 2019-11-08 DIAGNOSIS — N12 PYELONEPHRITIS: Primary | ICD-10-CM

## 2019-11-08 LAB
BASOPHILS # BLD: 0 K/UL (ref 0–0.1)
BASOPHILS NFR BLD: 0 % (ref 0–1)
COMMENT, HOLDF: NORMAL
DIFFERENTIAL METHOD BLD: NORMAL
EOSINOPHIL # BLD: 0.2 K/UL (ref 0–0.4)
EOSINOPHIL NFR BLD: 2 % (ref 0–7)
ERYTHROCYTE [DISTWIDTH] IN BLOOD BY AUTOMATED COUNT: 13.2 % (ref 11.5–14.5)
HCT VFR BLD AUTO: 41.7 % (ref 36.6–50.3)
HGB BLD-MCNC: 12.7 G/DL (ref 12.1–17)
IMM GRANULOCYTES # BLD AUTO: 0 K/UL (ref 0–0.04)
IMM GRANULOCYTES NFR BLD AUTO: 0 % (ref 0–0.5)
LYMPHOCYTES # BLD: 1.9 K/UL (ref 0.8–3.5)
LYMPHOCYTES NFR BLD: 21 % (ref 12–49)
MCH RBC QN AUTO: 29.1 PG (ref 26–34)
MCHC RBC AUTO-ENTMCNC: 30.5 G/DL (ref 30–36.5)
MCV RBC AUTO: 95.6 FL (ref 80–99)
MONOCYTES # BLD: 0.8 K/UL (ref 0–1)
MONOCYTES NFR BLD: 9 % (ref 5–13)
NEUTS SEG # BLD: 5.9 K/UL (ref 1.8–8)
NEUTS SEG NFR BLD: 68 % (ref 32–75)
NRBC # BLD: 0 K/UL (ref 0–0.01)
NRBC BLD-RTO: 0 PER 100 WBC
PLATELET # BLD AUTO: 200 K/UL (ref 150–400)
PMV BLD AUTO: 11 FL (ref 8.9–12.9)
RBC # BLD AUTO: 4.36 M/UL (ref 4.1–5.7)
SAMPLES BEING HELD,HOLD: NORMAL
WBC # BLD AUTO: 8.8 K/UL (ref 4.1–11.1)

## 2019-11-08 PROCEDURE — 99284 EMERGENCY DEPT VISIT MOD MDM: CPT

## 2019-11-08 PROCEDURE — 96365 THER/PROPH/DIAG IV INF INIT: CPT

## 2019-11-08 PROCEDURE — 83690 ASSAY OF LIPASE: CPT

## 2019-11-08 PROCEDURE — 80053 COMPREHEN METABOLIC PANEL: CPT

## 2019-11-08 PROCEDURE — 85025 COMPLETE CBC W/AUTO DIFF WBC: CPT

## 2019-11-08 PROCEDURE — 36415 COLL VENOUS BLD VENIPUNCTURE: CPT

## 2019-11-08 RX ORDER — ACETAMINOPHEN 500 MG
1000 TABLET ORAL ONCE
Status: COMPLETED | OUTPATIENT
Start: 2019-11-08 | End: 2019-11-09

## 2019-11-09 ENCOUNTER — APPOINTMENT (OUTPATIENT)
Dept: CT IMAGING | Age: 34
End: 2019-11-09
Attending: EMERGENCY MEDICINE
Payer: MEDICARE

## 2019-11-09 VITALS
BODY MASS INDEX: 25.64 KG/M2 | OXYGEN SATURATION: 98 % | RESPIRATION RATE: 14 BRPM | HEIGHT: 65 IN | DIASTOLIC BLOOD PRESSURE: 65 MMHG | WEIGHT: 153.88 LBS | TEMPERATURE: 98.3 F | SYSTOLIC BLOOD PRESSURE: 130 MMHG | HEART RATE: 65 BPM

## 2019-11-09 LAB
ALBUMIN SERPL-MCNC: 3.8 G/DL (ref 3.5–5)
ALBUMIN/GLOB SERPL: 0.7 {RATIO} (ref 1.1–2.2)
ALP SERPL-CCNC: 96 U/L (ref 45–117)
ALT SERPL-CCNC: 23 U/L (ref 12–78)
ANION GAP SERPL CALC-SCNC: 11 MMOL/L (ref 5–15)
APPEARANCE UR: ABNORMAL
AST SERPL-CCNC: 45 U/L (ref 15–37)
BACTERIA URNS QL MICRO: NEGATIVE /HPF
BILIRUB SERPL-MCNC: 0.5 MG/DL (ref 0.2–1)
BILIRUB UR QL: NEGATIVE
BUN SERPL-MCNC: 39 MG/DL (ref 6–20)
BUN/CREAT SERPL: 4 (ref 12–20)
CALCIUM SERPL-MCNC: 7.2 MG/DL (ref 8.5–10.1)
CHLORIDE SERPL-SCNC: 103 MMOL/L (ref 97–108)
CO2 SERPL-SCNC: 23 MMOL/L (ref 21–32)
COLOR UR: ABNORMAL
CREAT SERPL-MCNC: 9.81 MG/DL (ref 0.7–1.3)
EPITH CASTS URNS QL MICRO: ABNORMAL /LPF
GLOBULIN SER CALC-MCNC: 5.3 G/DL (ref 2–4)
GLUCOSE SERPL-MCNC: 85 MG/DL (ref 65–100)
GLUCOSE UR STRIP.AUTO-MCNC: NEGATIVE MG/DL
HGB UR QL STRIP: ABNORMAL
KETONES UR QL STRIP.AUTO: NEGATIVE MG/DL
LEUKOCYTE ESTERASE UR QL STRIP.AUTO: ABNORMAL
LIPASE SERPL-CCNC: 377 U/L (ref 73–393)
NITRITE UR QL STRIP.AUTO: POSITIVE
PH UR STRIP: 7.5 [PH] (ref 5–8)
POTASSIUM SERPL-SCNC: 5.4 MMOL/L (ref 3.5–5.1)
PROT SERPL-MCNC: 9.1 G/DL (ref 6.4–8.2)
PROT UR STRIP-MCNC: 300 MG/DL
RBC #/AREA URNS HPF: ABNORMAL /HPF (ref 0–5)
SODIUM SERPL-SCNC: 137 MMOL/L (ref 136–145)
SP GR UR REFRACTOMETRY: 1.01 (ref 1–1.03)
UR CULT HOLD, URHOLD: NORMAL
UROBILINOGEN UR QL STRIP.AUTO: 0.2 EU/DL (ref 0.2–1)
WBC URNS QL MICRO: >100 /HPF (ref 0–4)

## 2019-11-09 PROCEDURE — 74176 CT ABD & PELVIS W/O CONTRAST: CPT

## 2019-11-09 PROCEDURE — 74011250637 HC RX REV CODE- 250/637: Performed by: EMERGENCY MEDICINE

## 2019-11-09 PROCEDURE — 81001 URINALYSIS AUTO W/SCOPE: CPT

## 2019-11-09 PROCEDURE — 74011000258 HC RX REV CODE- 258: Performed by: EMERGENCY MEDICINE

## 2019-11-09 PROCEDURE — 74011250636 HC RX REV CODE- 250/636: Performed by: EMERGENCY MEDICINE

## 2019-11-09 RX ORDER — HYDROCODONE BITARTRATE AND ACETAMINOPHEN 5; 325 MG/1; MG/1
1 TABLET ORAL
Status: COMPLETED | OUTPATIENT
Start: 2019-11-09 | End: 2019-11-09

## 2019-11-09 RX ORDER — CEPHALEXIN 500 MG/1
500 CAPSULE ORAL 3 TIMES DAILY
Qty: 42 CAP | Refills: 0 | Status: SHIPPED | OUTPATIENT
Start: 2019-11-09 | End: 2019-11-23

## 2019-11-09 RX ADMIN — ACETAMINOPHEN 1000 MG: 500 TABLET ORAL at 00:30

## 2019-11-09 RX ADMIN — CEFTRIAXONE 1 G: 1 INJECTION, POWDER, FOR SOLUTION INTRAMUSCULAR; INTRAVENOUS at 02:04

## 2019-11-09 RX ADMIN — HYDROCODONE BITARTRATE AND ACETAMINOPHEN 1 TABLET: 5; 325 TABLET ORAL at 02:38

## 2019-11-09 NOTE — ED PROVIDER NOTES
36 y.o. male with past medical history significant for ESRD, vesicoureteric reflux, Congenital hydroureteronephrosis, Crohn's disease, ARF, CKD, DVT who presents from home via personal vehicle with chief complaint of flank pain. Pt presents to the ED and reports severe pulsating bilateral flank pain which began earlier this afternoon after he used the restroom to urinate. Pt reports pain exacerbation during ambulation and under palpation and notes that nothing helps alleviate his pain. Also reports suprapubic pain and multiple episodes of emesis today. Last episode of emesis around 2345 tonight 11/8/19. Pt has a hx of pancreatitis and notes that this current pain feels worse. Underwent a full regimen of dialysis yesterday 11/7/19    There are no other acute medical concerns at this time. Surgical hx - DVT filter placement, LUE AV graft placement   Social hx - Tobacco use: former smoker, Alcohol Use: non drinker, Drug Use: denies    PCP: Other, MD Lui    Note written by Joselito Arellano, as dictated by Sabra Santos MD 12:08 AM.      The history is provided by the patient and the spouse. No  was used.         Past Medical History:   Diagnosis Date    Acute renal failure (ARF) ([address] 7/30/2015    Chronic kidney disease     pt on hemodialysis d/t reflux    Congenital hydroureteronephrosis     Crohn disease ([address]     DVT (deep venous thrombosis) ([facility])          ESRD (end stage renal disease) ([address]     on HD 0110-0668    Gastrointestinal disorder     Chron's    VUR (vesicoureteric reflux)        Past Surgical History:   Procedure Laterality Date    HX OTHER SURGICAL  Pt has a filter for DVT's    HX OTHER SURGICAL      Marquis placed 3 weeks ago    HX VASCULAR ACCESS  1/14/14    CREATION LEFT UPPER ARM ARTERIO VENOUS GRAFT   (7mm PTFE)         Family History:   Problem Relation Age of Onset    Heart Disease Other     Kidney Disease Other         kidney stones    Diabetes Mother     Hypertension Mother        Social History     Socioeconomic History    Marital status: SINGLE     Spouse name: Not on file    Number of children: Not on file    Years of education: Not on file    Highest education level: Not on file   Occupational History    Not on file   Social Needs    Financial resource strain: Not on file    Food insecurity:     Worry: Not on file     Inability: Not on file    Transportation needs:     Medical: Not on file     Non-medical: Not on file   Tobacco Use    Smoking status: Former Smoker    Smokeless tobacco: Never Used    Tobacco comment: quit about a year ago    Substance and Sexual Activity    Alcohol use: No    Drug use: No    Sexual activity: Yes     Partners: Female   Lifestyle    Physical activity:     Days per week: Not on file     Minutes per session: Not on file    Stress: Not on file   Relationships    Social connections:     Talks on phone: Not on file     Gets together: Not on file     Attends Oriental orthodox service: Not on file     Active member of club or organization: Not on file     Attends meetings of clubs or organizations: Not on file     Relationship status: Not on file    Intimate partner violence:     Fear of current or ex partner: Not on file     Emotionally abused: Not on file     Physically abused: Not on file     Forced sexual activity: Not on file   Other Topics Concern    Not on file   Social History Narrative    Not on file         ALLERGIES: Codeine; Iodinated contrast media; and Shellfish derived    Review of Systems   Constitutional: Negative for chills and fever. Respiratory: Negative for shortness of breath. Cardiovascular: Negative for chest pain. Gastrointestinal: Positive for abdominal pain and vomiting. Negative for constipation and diarrhea. Genitourinary: Positive for flank pain. Neurological: Negative for dizziness and light-headedness. All other systems reviewed and are negative.       Vitals: 11/08/19 2259   BP: 127/78   Pulse: 66   Resp: 16   Temp: 98.4 °F (36.9 °C)   SpO2: 100%   Weight: 69.8 kg (153 lb 14.1 oz)   Height: 5' 5\" (1.651 m)            Physical Exam   Constitutional: He appears well-developed and well-nourished. No distress. Pt appears uncomfortable    HENT:   Head: Normocephalic and atraumatic. Eyes: Pupils are equal, round, and reactive to light. Conjunctivae are normal.   Neck: Normal range of motion. Cardiovascular: Regular rhythm. Tachycardia present. Pulmonary/Chest: Effort normal and breath sounds normal.   Mild rhonchi @ bilateral lung bases. Abdominal: Soft. He exhibits no distension. There is guarding. Bilateral CVA tenderness   Musculoskeletal: Normal range of motion. Neurological: He is alert. Skin: Skin is dry. Capillary refill takes less than 2 seconds. Nursing note and vitals reviewed. Note written by Joselito Alex, as dictated by Chuy Wild MD 12:08 AM    MDM  Number of Diagnoses or Management Options  Pyelonephritis:   Diagnosis management comments: The patient is resting comfortably and feels better, is alert and in no distress. Workup consistent with pyelonephritis. The repeat examination is unremarkable and benign; in particular, there is no discomfort at McBurney's point. The history, exam, diagnostic testing, and current condition do not suggest acute appendicitis, bowel obstruction, incarcerated hernia, acute cholecystitis, bowel perforation, major gastrointestinal bleeding, severe diverticulitis, sepsis, or other significant pathology to warrant further testing, continued ED treatment, admission, or surgical evaluation at this point. The vital signs have been stable and are within normal limits at this time. The patient does not have uncontrollable pain, intractable vomiting, or other significant symptoms. The patient's condition is stable and appropriate for discharge.  The patient will pursue further outpatient evaluation with the primary care physician or other designated or consulting physician as indicated in the discharge instructions. Procedures      2:19 AM  Patient's results have been reviewed with them. Patient and/or family have verbally conveyed their understanding and agreement of the patient's signs, symptoms, diagnosis, treatment and prognosis and additionally agree to follow up as recommended or return to the Emergency Room should their condition change prior to follow-up. Discharge instructions have also been provided to the patient with some educational information regarding their diagnosis as well a list of reasons why they would want to return to the ER prior to their follow-up appointment should their condition change.

## 2019-11-09 NOTE — ED TRIAGE NOTES
Patient arrives for bilateral flank pain today. Patient's girlfriend states this has worsened today. Patient able to speak in complete sentences. Denies any issues urinating today. Denies taking anything for pain prior to arrival. Patient reports he had a full run of dialysis Thursday. Patient's girlfriend rubbing his stomach throughout triage which patient says makes his pain worse along with walking.

## 2019-11-09 NOTE — ED NOTES
MD reviewed discharge instructions and options with patient and patient verbalized understanding. RN reviewed discharge instructions using teachback method. Pt ambulated to exit without difficulty and in no signs of acute distress escorted by female , and she  will drive home. No complaints or needs expressed at this time. Patient was counseled on medications prescribed at discharge. VSS, verbalized relief from most intense pain. Patient to call PCP in the morning for appointment.

## 2019-11-09 NOTE — DISCHARGE INSTRUCTIONS
Patient Education        Kidney Infection: Care Instructions  Your Care Instructions    A kidney infection (pyelonephritis) is a type of urinary tract infection, or UTI. Most UTIs are bladder infections. Kidney infections tend to make people much sicker than bladder infections do. A kidney infection is also more serious because it can cause lasting damage if it is not treated quickly. Follow-up care is a key part of your treatment and safety. Be sure to make and go to all appointments, and call your doctor if you are having problems. It's also a good idea to know your test results and keep a list of the medicines you take. How can you care for yourself at home? · Take your antibiotics as directed. Do not stop taking them just because you feel better. You need to take the full course of antibiotics. · Drink plenty of water, enough so that your urine is light yellow or clear like water. This may help wash out bacteria that are causing the infection. If you have kidney, heart, or liver disease and have to limit fluids, talk with your doctor before you increase the amount of fluids you drink. · Urinate often. Try to empty your bladder each time. · To relieve pain, take a hot shower or lay a heating pad (set on low) over your lower belly. Never go to sleep with a heating pad in place. Put a thin cloth between the heating pad and your skin. To help prevent kidney infections  · Drink plenty of water each day. This helps you urinate often, which clears bacteria from your system. If you have kidney, heart, or liver disease and have to limit fluids, talk with your doctor before you increase the amount of fluids you drink. · Urinate when you have the urge. Do not hold your urine for a long time. Urinate before you go to sleep. · If you have symptoms of a bladder infection, such as burning when you urinate or having to urinate often, call your doctor so you can treat the problem before it gets worse.  If you do not treat a bladder infection quickly, it can spread to the kidney. · Men should keep the tip of the penis clean. If you are a woman, keep these ideas in mind:  · Urinate right after you have sex. · Change sanitary pads often. Avoid douches, feminine hygiene sprays, and other feminine hygiene products that have deodorants. · After going to the bathroom, wipe from front to back. When should you call for help? Call your doctor now or seek immediate medical care if:    · You have symptoms that a kidney infection is getting worse. These may include:  ? Pain or burning when you urinate. ? A frequent need to urinate without being able to pass much urine. ? Pain in the flank, which is just below the rib cage and above the waist on either side of the back. ? Blood in the urine. ? A fever.     · You are vomiting or nauseated.    Watch closely for changes in your health, and be sure to contact your doctor if:    · You do not get better as expected. Where can you learn more? Go to http://roselyn-francia.info/. Enter K187 in the search box to learn more about \"Kidney Infection: Care Instructions. \"  Current as of: October 31, 2018  Content Version: 12.2  © 5986-2410 [employer]. Care instructions adapted under license by [employer] (which disclaims liability or warranty for this information). If you have questions about a medical condition or this instruction, always ask your healthcare professional. [place] [address] any warranty or liability for your use of this information.

## 2019-12-26 ENCOUNTER — APPOINTMENT (OUTPATIENT)
Dept: CT IMAGING | Age: 34
End: 2019-12-26
Attending: EMERGENCY MEDICINE
Payer: MEDICARE

## 2019-12-26 ENCOUNTER — HOSPITAL ENCOUNTER (EMERGENCY)
Age: 34
Discharge: HOME | End: 2019-12-26
Attending: EMERGENCY MEDICINE | Admitting: EMERGENCY MEDICINE
Payer: MEDICARE

## 2019-12-26 VITALS
BODY MASS INDEX: 26.22 KG/M2 | OXYGEN SATURATION: 100 % | HEART RATE: 57 BPM | DIASTOLIC BLOOD PRESSURE: 81 MMHG | RESPIRATION RATE: 20 BRPM | HEIGHT: 66 IN | WEIGHT: 163.14 LBS | TEMPERATURE: 99.2 F | SYSTOLIC BLOOD PRESSURE: 137 MMHG

## 2019-12-26 DIAGNOSIS — M54.50 ACUTE BILATERAL LOW BACK PAIN WITHOUT SCIATICA: Primary | ICD-10-CM

## 2019-12-26 DIAGNOSIS — K59.00 CONSTIPATION, UNSPECIFIED CONSTIPATION TYPE: ICD-10-CM

## 2019-12-26 DIAGNOSIS — N12 PYELONEPHRITIS: ICD-10-CM

## 2019-12-26 LAB
ALBUMIN SERPL-MCNC: 3.7 G/DL (ref 3.5–5)
ALBUMIN/GLOB SERPL: 0.7 {RATIO} (ref 1.1–2.2)
ALP SERPL-CCNC: 118 U/L (ref 45–117)
ALT SERPL-CCNC: 28 U/L (ref 12–78)
ANION GAP SERPL CALC-SCNC: 7 MMOL/L (ref 5–15)
APPEARANCE UR: ABNORMAL
AST SERPL-CCNC: 37 U/L (ref 15–37)
BACTERIA URNS QL MICRO: ABNORMAL /HPF
BASOPHILS # BLD: 0 K/UL (ref 0–0.1)
BASOPHILS NFR BLD: 1 % (ref 0–1)
BILIRUB SERPL-MCNC: 0.7 MG/DL (ref 0.2–1)
BILIRUB UR QL: NEGATIVE
BUN SERPL-MCNC: 30 MG/DL (ref 6–20)
BUN/CREAT SERPL: 5 (ref 12–20)
CALCIUM SERPL-MCNC: 8.3 MG/DL (ref 8.5–10.1)
CHLORIDE SERPL-SCNC: 106 MMOL/L (ref 97–108)
CO2 SERPL-SCNC: 22 MMOL/L (ref 21–32)
COLOR UR: ABNORMAL
COMMENT, HOLDF: NORMAL
CREAT SERPL-MCNC: 6 MG/DL (ref 0.7–1.3)
DIFFERENTIAL METHOD BLD: ABNORMAL
EOSINOPHIL # BLD: 0.1 K/UL (ref 0–0.4)
EOSINOPHIL NFR BLD: 1 % (ref 0–7)
EPITH CASTS URNS QL MICRO: ABNORMAL /LPF
ERYTHROCYTE [DISTWIDTH] IN BLOOD BY AUTOMATED COUNT: 14.6 % (ref 11.5–14.5)
GLOBULIN SER CALC-MCNC: 5.1 G/DL (ref 2–4)
GLUCOSE SERPL-MCNC: 81 MG/DL (ref 65–100)
GLUCOSE UR STRIP.AUTO-MCNC: NEGATIVE MG/DL
HCT VFR BLD AUTO: 47.1 % (ref 36.6–50.3)
HGB BLD-MCNC: 14.5 G/DL (ref 12.1–17)
HGB UR QL STRIP: ABNORMAL
IMM GRANULOCYTES # BLD AUTO: 0 K/UL (ref 0–0.04)
IMM GRANULOCYTES NFR BLD AUTO: 0 % (ref 0–0.5)
KETONES UR QL STRIP.AUTO: NEGATIVE MG/DL
LEUKOCYTE ESTERASE UR QL STRIP.AUTO: ABNORMAL
LYMPHOCYTES # BLD: 1.6 K/UL (ref 0.8–3.5)
LYMPHOCYTES NFR BLD: 19 % (ref 12–49)
MCH RBC QN AUTO: 30 PG (ref 26–34)
MCHC RBC AUTO-ENTMCNC: 30.8 G/DL (ref 30–36.5)
MCV RBC AUTO: 97.3 FL (ref 80–99)
MONOCYTES # BLD: 0.6 K/UL (ref 0–1)
MONOCYTES NFR BLD: 8 % (ref 5–13)
NEUTS SEG # BLD: 5.7 K/UL (ref 1.8–8)
NEUTS SEG NFR BLD: 71 % (ref 32–75)
NITRITE UR QL STRIP.AUTO: NEGATIVE
NRBC # BLD: 0 K/UL (ref 0–0.01)
NRBC BLD-RTO: 0 PER 100 WBC
PH UR STRIP: 7.5 [PH] (ref 5–8)
PLATELET # BLD AUTO: 147 K/UL (ref 150–400)
PMV BLD AUTO: 10.3 FL (ref 8.9–12.9)
POTASSIUM SERPL-SCNC: 4.3 MMOL/L (ref 3.5–5.1)
PROT SERPL-MCNC: 8.8 G/DL (ref 6.4–8.2)
PROT UR STRIP-MCNC: 300 MG/DL
RBC # BLD AUTO: 4.84 M/UL (ref 4.1–5.7)
RBC #/AREA URNS HPF: ABNORMAL /HPF (ref 0–5)
SAMPLES BEING HELD,HOLD: NORMAL
SODIUM SERPL-SCNC: 135 MMOL/L (ref 136–145)
SP GR UR REFRACTOMETRY: 1.01 (ref 1–1.03)
UR CULT HOLD, URHOLD: NORMAL
UROBILINOGEN UR QL STRIP.AUTO: 0.2 EU/DL (ref 0.2–1)
WBC # BLD AUTO: 8 K/UL (ref 4.1–11.1)
WBC URNS QL MICRO: >100 /HPF (ref 0–4)

## 2019-12-26 PROCEDURE — 96374 THER/PROPH/DIAG INJ IV PUSH: CPT

## 2019-12-26 PROCEDURE — 80053 COMPREHEN METABOLIC PANEL: CPT

## 2019-12-26 PROCEDURE — 99284 EMERGENCY DEPT VISIT MOD MDM: CPT

## 2019-12-26 PROCEDURE — 81001 URINALYSIS AUTO W/SCOPE: CPT

## 2019-12-26 PROCEDURE — 87086 URINE CULTURE/COLONY COUNT: CPT

## 2019-12-26 PROCEDURE — 74011000250 HC RX REV CODE- 250: Performed by: EMERGENCY MEDICINE

## 2019-12-26 PROCEDURE — 74011250636 HC RX REV CODE- 250/636: Performed by: EMERGENCY MEDICINE

## 2019-12-26 PROCEDURE — 74011250637 HC RX REV CODE- 250/637: Performed by: EMERGENCY MEDICINE

## 2019-12-26 PROCEDURE — 74176 CT ABD & PELVIS W/O CONTRAST: CPT

## 2019-12-26 PROCEDURE — 85025 COMPLETE CBC W/AUTO DIFF WBC: CPT

## 2019-12-26 RX ORDER — DIAZEPAM 5 MG/1
5 TABLET ORAL
Status: COMPLETED | OUTPATIENT
Start: 2019-12-26 | End: 2019-12-26

## 2019-12-26 RX ORDER — ONDANSETRON 2 MG/ML
4 INJECTION INTRAMUSCULAR; INTRAVENOUS
Status: COMPLETED | OUTPATIENT
Start: 2019-12-26 | End: 2019-12-26

## 2019-12-26 RX ORDER — CEPHALEXIN 500 MG/1
500 CAPSULE ORAL 2 TIMES DAILY
Qty: 14 CAP | Refills: 0 | OUTPATIENT
Start: 2019-12-26 | End: 2020-06-25

## 2019-12-26 RX ORDER — ONDANSETRON 4 MG/1
4 TABLET, ORALLY DISINTEGRATING ORAL
Qty: 10 TAB | Refills: 0 | Status: SHIPPED | OUTPATIENT
Start: 2019-12-26 | End: 2020-11-09

## 2019-12-26 RX ORDER — CYCLOBENZAPRINE HCL 10 MG
10 TABLET ORAL
Qty: 12 TAB | Refills: 0 | Status: SHIPPED | OUTPATIENT
Start: 2019-12-26 | End: 2020-11-09

## 2019-12-26 RX ORDER — LIDOCAINE 50 MG/G
PATCH TOPICAL
Qty: 15 EACH | Refills: 0 | Status: SHIPPED | OUTPATIENT
Start: 2019-12-26 | End: 2020-11-09

## 2019-12-26 RX ORDER — LIDOCAINE 50 MG/G
2 PATCH TOPICAL EVERY 24 HOURS
Status: DISCONTINUED | OUTPATIENT
Start: 2019-12-26 | End: 2019-12-26 | Stop reason: HOSPADM

## 2019-12-26 RX ORDER — HYDROCODONE BITARTRATE AND ACETAMINOPHEN 5; 325 MG/1; MG/1
1 TABLET ORAL ONCE
Status: COMPLETED | OUTPATIENT
Start: 2019-12-26 | End: 2019-12-26

## 2019-12-26 RX ORDER — POLYETHYLENE GLYCOL 3350 17 G/17G
17 POWDER, FOR SOLUTION ORAL DAILY
Qty: 140 G | Refills: 0 | Status: SHIPPED | OUTPATIENT
Start: 2019-12-26 | End: 2020-01-02

## 2019-12-26 RX ADMIN — ONDANSETRON 4 MG: 2 INJECTION INTRAMUSCULAR; INTRAVENOUS at 12:32

## 2019-12-26 RX ADMIN — DIAZEPAM 5 MG: 5 TABLET ORAL at 12:12

## 2019-12-26 RX ADMIN — HYDROCODONE BITARTRATE AND ACETAMINOPHEN 1 TABLET: 5; 325 TABLET ORAL at 12:32

## 2019-12-26 NOTE — ED PROVIDER NOTES
30-year-old male with end-stage renal disease, dialysis Tuesday Thursday Saturday presents with complaint of low back pain. Patient reports he was at dialysis today which he completed and was 3 hours long when he developed low back pain. He reports he completed dialysis. He reports they checked his temperature and he had no fever and they gave him Tylenol for pain. He reports the pain is worse with bending or twisting. He denies any falls or heavy lifting or injury. He states he has had these episodes before and has been seen here multiple times. He last was seen here per chart review 11/9 and had a CT which was negative and urine that looked infected and was treated for pyelonephritis with Keflex. He reports the symptoms had resolved at that point. He denies fevers, chest pain, shortness of breath, abdominal pain.            Past Medical History:   Diagnosis Date    Acute renal failure (ARF) ([facility] [address] 7/30/2015    Chronic kidney disease     pt on hemodialysis d/t reflux    Congenital hydroureteronephrosis     Crohn disease ([facility] [address]     [facility] thrombosis) [facility]          ESRD (end stage renal disease) ([facility] [address]     on HD [id number]    Gastrointestinal disorder     Chron's    VUR (vesicoureteric reflux)        Past Surgical History:   Procedure Laterality Date    HX OTHER SURGICAL  Pt has a filter for DVT's    HX OTHER SURGICAL      Marquis placed 3 weeks ago    HX VASCULAR ACCESS  1/14/14    CREATION LEFT UPPER ARM ARTERIO VENOUS GRAFT   (7mm PTFE)         Family History:   Problem Relation Age of Onset    Heart Disease Other     Kidney Disease Other         kidney stones    Diabetes Mother     Hypertension Mother        Social History     Socioeconomic History    Marital status: SINGLE     Spouse name: Not on file    Number of children: Not on file    Years of education: Not on file    Highest education level: Not on file   Occupational History    Not on file   Social Needs    Financial resource strain: Not on file    Food insecurity:     Worry: Not on file     Inability: Not on file    Transportation needs:     Medical: Not on file     Non-medical: Not on file   Tobacco Use    Smoking status: Former Smoker    Smokeless tobacco: Never Used    Tobacco comment: quit about a year ago    Substance and Sexual Activity    Alcohol use: No    Drug use: No    Sexual activity: Yes     Partners: Female   Lifestyle    Physical activity:     Days per week: Not on file     Minutes per session: Not on file    Stress: Not on file   Relationships    Social connections:     Talks on phone: Not on file     Gets together: Not on file     Attends Evangelical service: Not on file     Active member of club or organization: Not on file     Attends meetings of clubs or organizations: Not on file     Relationship status: Not on file    Intimate partner violence:     Fear of current or ex partner: Not on file     Emotionally abused: Not on file     Physically abused: Not on file     Forced sexual activity: Not on file   Other Topics Concern    Not on file   Social History Narrative    Not on file         ALLERGIES: Codeine; Iodinated contrast media; and Shellfish derived    Review of Systems   Constitutional: Negative for fever. Respiratory: Negative for shortness of breath. Cardiovascular: Negative for chest pain. Gastrointestinal: Negative for abdominal pain. Musculoskeletal: Positive for back pain. All other systems reviewed and are negative. Vitals:    12/26/19 1044   BP: 147/80   Pulse: (!) 57   Resp: 20   Temp: 98.6 °F (37 °C)   SpO2: 100%   Weight: 74 kg (163 lb 2.3 oz)   Height: 5' 6\" (1.676 m)            Physical Exam  Vitals signs and nursing note reviewed. Constitutional:       Appearance: He is well-developed. HENT:      Head: Normocephalic and atraumatic. Eyes:      Conjunctiva/sclera: Conjunctivae normal.   Neck:      Musculoskeletal: Normal range of motion.    Cardiovascular: Rate and Rhythm: Normal rate and regular rhythm. Pulses: Normal pulses. Heart sounds: Normal heart sounds. No murmur. Pulmonary:      Effort: Pulmonary effort is normal. No respiratory distress. Breath sounds: Normal breath sounds. No stridor. No wheezing or rhonchi. Abdominal:      General: Bowel sounds are normal. There is no distension. Palpations: Abdomen is soft. There is no mass. Tenderness: There is no tenderness. There is no guarding or rebound. Hernia: No hernia is present. Musculoskeletal: Normal range of motion. Comments: No midline T or L-spine tenderness to palpation or step-off, positive SI joint tenderness to palpation bilaterally, pain worse with bending forward   Skin:     General: Skin is warm and dry. Neurological:      Mental Status: He is alert and oriented to person, place, and time. MDM  Number of Diagnoses or Management Options  Acute bilateral low back pain without sciatica:   Constipation, unspecified constipation type:   Pyelonephritis:   Diagnosis management comments: Could be pyelonephritis however reviewed past few charts and patient appears to have leukocytes in his urine however the urine cultures end up coming back negative. The pain sounds more muscular as it is worse with movement. We will try Valium and Lidoderm patch while getting the work-up. No symptoms of a AAA. No symptoms of spinal cord compression. Patient ambulates with a steady gait       Amount and/or Complexity of Data Reviewed  Clinical lab tests: ordered and reviewed  Tests in the radiology section of CPT®: ordered and reviewed  Review and summarize past medical records: yes    Patient Progress  Patient progress: stable         Procedures  3:11 PM  Patient's results have been reviewed with them.   Patient and/or family have verbally conveyed their understanding and agreement of the patient's signs, symptoms, diagnosis, treatment and prognosis and additionally agree to follow up as recommended or return to the Emergency Room should their condition change prior to follow-up. Discharge instructions have also been provided to the patient with some educational information regarding their diagnosis as well a list of reasons why they would want to return to the ER prior to their follow-up appointment should their condition change. Ct wih no stones. ? Pyelonephritis though prior cultures have all been negative. Pain does appear muscular as worse with movement though pt did have some vomiting on arrival. No feve. Nl wbc. Will treat with abx, also with muscle relaxers for back     1. Acute bilateral low back pain without sciatica    2. Constipation, unspecified constipation type    3.  Pyelonephritis

## 2019-12-26 NOTE — ED TRIAGE NOTES
Triage Note: Patient is coming in with bilateral flank pain at the end of dialysis today. They finished the whole treatment today.

## 2019-12-26 NOTE — DISCHARGE INSTRUCTIONS
Patient Education        Back Pain: Care Instructions  Your Care Instructions    Back pain has many possible causes. It is often related to problems with muscles and ligaments of the back. It may also be related to problems with the nerves, discs, or bones of the back. Moving, lifting, standing, sitting, or sleeping in an awkward way can strain the back. Sometimes you don't notice the injury until later. Arthritis is another common cause of back pain. Although it may hurt a lot, back pain usually improves on its own within several weeks. Most people recover in 12 weeks or less. Using good home treatment and being careful not to stress your back can help you feel better sooner. Follow-up care is a key part of your treatment and safety. Be sure to make and go to all appointments, and call your doctor if you are having problems. It's also a good idea to know your test results and keep a list of the medicines you take. How can you care for yourself at home? · Sit or lie in positions that are most comfortable and reduce your pain. Try one of these positions when you lie down:  ? Lie on your back with your knees bent and supported by large pillows. ? Lie on the floor with your legs on the seat of a sofa or chair. ? Lie on your side with your knees and hips bent and a pillow between your legs. ? Lie on your stomach if it does not make pain worse. · Do not sit up in bed, and avoid soft couches and twisted positions. Bed rest can help relieve pain at first, but it delays healing. Avoid bed rest after the first day of back pain. · Change positions every 30 minutes. If you must sit for long periods of time, take breaks from sitting. Get up and walk around, or lie in a comfortable position. · Try using a heating pad on a low or medium setting for 15 to 20 minutes every 2 or 3 hours. Try a warm shower in place of one session with the heating pad. · You can also try an ice pack for 10 to 15 minutes every 2 to 3 hours. Put a thin cloth between the ice pack and your skin. · Take pain medicines exactly as directed. ? If the doctor gave you a prescription medicine for pain, take it as prescribed. ? If you are not taking a prescription pain medicine, ask your doctor if you can take an over-the-counter medicine. · Take short walks several times a day. You can start with 5 to 10 minutes, 3 or 4 times a day, and work up to longer walks. Walk on level surfaces and avoid hills and stairs until your back is better. · Return to work and other activities as soon as you can. Continued rest without activity is usually not good for your back. · To prevent future back pain, do exercises to stretch and strengthen your back and stomach. Learn how to use good posture, safe lifting techniques, and proper body mechanics. When should you call for help? Call your doctor now or seek immediate medical care if:    · You have new or worsening numbness in your legs.     · You have new or worsening weakness in your legs. (This could make it hard to stand up.)     · You lose control of your bladder or bowels.    Watch closely for changes in your health, and be sure to contact your doctor if:    · You have a fever, lose weight, or don't feel well.     · You do not get better as expected. Where can you learn more? Go to http://roselyn-francia.info/. Enter L113 in the search box to learn more about \"Back Pain: Care Instructions. \"  Current as of: June 26, 2019  Content Version: 12.2  © 4646-8850 [employer]. Care instructions adapted under license by [employer] (which disclaims liability or warranty for this information). If you have questions about a medical condition or this instruction, always ask your healthcare professional. [place] [address] any warranty or liability for your use of this information.          Patient Education        Constipation: Care Instructions  Your Care Instructions    Constipation means that you have a hard time passing stools (bowel movements). People pass stools from 3 times a day to once every 3 days. What is normal for you may be different. Constipation may occur with pain in the rectum and cramping. The pain may get worse when you try to pass stools. Sometimes there are small amounts of bright red blood on toilet paper or the surface of stools. This is because of enlarged veins near the rectum (hemorrhoids). A few changes in your diet and lifestyle may help you avoid ongoing constipation. Your doctor may also prescribe medicine to help loosen your stool. Some medicines can cause constipation. These include pain medicines and antidepressants. Tell your doctor about all the medicines you take. Your doctor may want to make a medicine change to ease your symptoms. Follow-up care is a key part of your treatment and safety. Be sure to make and go to all appointments, and call your doctor if you are having problems. It's also a good idea to know your test results and keep a list of the medicines you take. How can you care for yourself at home? · Drink plenty of fluids, enough so that your urine is light yellow or clear like water. If you have kidney, heart, or liver disease and have to limit fluids, talk with your doctor before you increase the amount of fluids you drink. · Include high-fiber foods in your diet each day. These include fruits, vegetables, beans, and whole grains. · Get at least 30 minutes of exercise on most days of the week. Walking is a good choice. You also may want to do other activities, such as running, swimming, cycling, or playing tennis or team sports. · Take a fiber supplement, such as Citrucel or Metamucil, every day. Read and follow all instructions on the label. · Schedule time each day for a bowel movement. A daily routine may help. Take your time having your bowel movement.   · Support your feet with a small step stool when you sit on the toilet. This helps flex your hips and places your pelvis in a squatting position. · Your doctor may recommend an over-the-counter laxative to relieve your constipation. Examples are Milk of Magnesia and MiraLax. Read and follow all instructions on the label. Do not use laxatives on a long-term basis. When should you call for help? Call your doctor now or seek immediate medical care if:    · You have new or worse belly pain.     · You have new or worse nausea or vomiting.     · You have blood in your stools.    Watch closely for changes in your health, and be sure to contact your doctor if:    · Your constipation is getting worse.     · You do not get better as expected. Where can you learn more? Go to http://roselyn-francia.info/. Enter 21 [id number] in the search box to learn more about \"Constipation: Care Instructions. \"  Current as of: June 26, 2019  Content Version: 12.2  © 2909-7378 [employer]. Care instructions adapted under license by [employer] (which disclaims liability or warranty for this information). If you have questions about a medical condition or this instruction, always ask your healthcare professional. [place] [address] any warranty or liability for your use of this information. Patient Education        Getting Back to Normal After Low Back Pain: Care Instructions  Your Care Instructions  Almost everyone has low back pain at some time. The good news is that most low back pain will go away in a few days or weeks with some basic self-care. Some people are afraid that doing too much may make their pain worse. In the past, people stayed in bed, thinking this would help their backs. Now doctors think that, in most cases, getting back to your normal activities is good for your back, as long as you avoid doing things that make your pain worse. Follow-up care is a key part of your treatment and safety.  Be sure to make and go to all appointments, and call your doctor if you are having problems. It's also a good idea to know your test results and keep a list of the medicines you take. How can you care for yourself at home? Ease back into daily activities  · For the first day or two of pain, take it easy. But as soon as possible, get back to your normal daily life and activities. · Get gentle exercise, such as walking. Movement keeps your spine flexible and helps your muscles stay strong. · If you are an athlete, return to your activity carefully. Choose a low-impact option until your pain is under control. Avoid or change activities that cause pain  · Try to avoid too much bending, heavy lifting, or reaching. These movements put extra stress on your back. · In bed, try lying on your side with a pillow between your knees. Or lie on your back on the floor with a pillow under your knees. · When you sit, place a small pillow, a rolled-up towel, or a lumbar roll in the curve of your back for extra support. · Try putting one foot up on a stool or changing positions every few minutes if you have to stand still for a period of time. Pay attention to body mechanics and posture  Body mechanics are the way you use your body. Posture is the way you sit or stand. · Take extra care when you lift. When you must lift, bend your knees and keep your back straight. Avoid twisting, and keep the load close to your body. · Stand or sit tall, with your shoulders back and your stomach pulled in to support your back. Get support when you need it  · Let people know when you need a helping hand. Get family members or friends to help out with tasks you cannot do right now. · Be honest with your doctor about how the pain affects you. · If you have had to take time off work, talk to your doctor and boss about a gradual [id number] plan. Find out if there are other ways you could do your job to avoid hurting your back again.   Reduce stress  Worrying about the pain can cause you to tense the muscles in your lower back. This in turn causes more pain. Here are a few things you can do to relax your mind and your muscles:  · Take 10 to 15 minutes to sit quietly and breathe deeply. Try to focus only on your breathing. If you cannot keep thoughts away, think about things that make you feel good. · Get involved in your favorite hobby, or try something new. · Talk to a friend, read a book, or listen to your favorite music. · Find a counselor you like and trust. Talk openly and honestly about your problems. Be willing to make some changes. When should you call for help? Call 911 anytime you think you may need emergency care. For example, call if:    · You are unable to move a leg at all.   [facility] your doctor now or seek immediate medical care if:    · You have new or worse symptoms in your legs, belly, or buttocks. Symptoms may include:  ? Numbness or tingling. ? Weakness. ? Pain.     · You lose bladder or bowel control.    Watch closely for changes in your health, and be sure to contact your doctor if:    · You have a fever, lose weight, or don't feel well.     · You are not getting better as expected. Where can you learn more? Go to http://roselyn-francia.info/. Enter M709 in the search box to learn more about \"Getting Back to Normal After Low Back Pain: Care Instructions. \"  Current as of: June 26, 2019  Content Version: 12.2  © 5971-3549 [employer]. Care instructions adapted under license by [employer] (which disclaims liability or warranty for this information). If you have questions about a medical condition or this instruction, always ask your healthcare professional. [place] [address] any warranty or liability for your use of this information.          Patient Education        Kidney Infection: Care Instructions  Your Care Instructions    A kidney infection (pyelonephritis) is a type of urinary tract infection, or UTI. Most UTIs are bladder infections. Kidney infections tend to make people much sicker than bladder infections do. A kidney infection is also more serious because it can cause lasting damage if it is not treated quickly. Follow-up care is a key part of your treatment and safety. Be sure to make and go to all appointments, and call your doctor if you are having problems. It's also a good idea to know your test results and keep a list of the medicines you take. How can you care for yourself at home? · Take your antibiotics as directed. Do not stop taking them just because you feel better. You need to take the full course of antibiotics. · Drink plenty of water, enough so that your urine is light yellow or clear like water. This may help wash out bacteria that are causing the infection. If you have kidney, heart, or liver disease and have to limit fluids, talk with your doctor before you increase the amount of fluids you drink. · Urinate often. Try to empty your bladder each time. · To relieve pain, take a hot shower or lay a heating pad (set on low) over your lower belly. Never go to sleep with a heating pad in place. Put a thin cloth between the heating pad and your skin. To help prevent kidney infections  · Drink plenty of water each day. This helps you urinate often, which clears bacteria from your system. If you have kidney, heart, or liver disease and have to limit fluids, talk with your doctor before you increase the amount of fluids you drink. · Urinate when you have the urge. Do not hold your urine for a long time. Urinate before you go to sleep. · If you have symptoms of a bladder infection, such as burning when you urinate or having to urinate often, call your doctor so you can treat the problem before it gets worse. If you do not treat a bladder infection quickly, it can spread to the kidney. · Men should keep the tip of the penis clean.   If you are a woman, keep these ideas in mind:  · Urinate right after you have sex. · Change sanitary pads often. Avoid douches, feminine hygiene sprays, and other feminine hygiene products that have deodorants. · After going to the bathroom, wipe from front to back. When should you call for help? Call your doctor now or seek immediate medical care if:    · You have symptoms that a kidney infection is getting worse. These may include:  ? Pain or burning when you urinate. ? A frequent need to urinate without being able to pass much urine. ? Pain in the flank, which is just below the rib cage and above the waist on either side of the back. ? Blood in the urine. ? A fever.     · You are vomiting or nauseated.    Watch closely for changes in your health, and be sure to contact your doctor if:    · You do not get better as expected. Where can you learn more? Go to http://roselyn-francia.info/. Enter Z966 in the search box to learn more about \"Kidney Infection: Care Instructions. \"  Current as of: October 31, 2018  Content Version: 12.2  © 2927-1449 [employer]. Care instructions adapted under license by [employer] (which disclaims liability or warranty for this information). If you have questions about a medical condition or this instruction, always ask your healthcare professional. [place] [address] any warranty or liability for your use of this information.

## 2019-12-27 LAB
BACTERIA SPEC CULT: NORMAL
CC UR VC: NORMAL
SERVICE CMNT-IMP: NORMAL

## 2020-02-19 VITALS
DIASTOLIC BLOOD PRESSURE: 88 MMHG | SYSTOLIC BLOOD PRESSURE: 144 MMHG | RESPIRATION RATE: 18 BRPM | HEART RATE: 65 BPM | TEMPERATURE: 98 F | OXYGEN SATURATION: 99 %

## 2020-02-19 PROCEDURE — 75810000275 HC EMERGENCY DEPT VISIT NO LEVEL OF CARE

## 2020-02-20 ENCOUNTER — HOSPITAL ENCOUNTER (EMERGENCY)
Age: 35
Discharge: LEFT BEFORE DISCHARGE | End: 2020-02-20
Attending: EMERGENCY MEDICINE
Payer: MEDICARE

## 2020-02-20 NOTE — ED TRIAGE NOTES
Pt arrives via ems c/o 10/10 bilateral flank and lower back pain. Pt reports \" I had a de-clot done\"  to fistula in Right upper arm. /80s 100% RA HR 70s.

## 2020-06-25 ENCOUNTER — HOSPITAL ENCOUNTER (EMERGENCY)
Age: 35
Discharge: HOME | End: 2020-06-25
Attending: EMERGENCY MEDICINE
Payer: SELF-PAY

## 2020-06-25 VITALS
HEART RATE: 89 BPM | RESPIRATION RATE: 16 BRPM | DIASTOLIC BLOOD PRESSURE: 70 MMHG | BODY MASS INDEX: 25.39 KG/M2 | SYSTOLIC BLOOD PRESSURE: 125 MMHG | WEIGHT: 158 LBS | TEMPERATURE: 98.9 F | OXYGEN SATURATION: 99 % | HEIGHT: 66 IN

## 2020-06-25 DIAGNOSIS — N12 PYELONEPHRITIS: Primary | ICD-10-CM

## 2020-06-25 LAB
APPEARANCE UR: ABNORMAL
BACTERIA URNS QL MICRO: ABNORMAL /HPF
BILIRUB UR QL: NEGATIVE
COLOR UR: ABNORMAL
EPITH CASTS URNS QL MICRO: ABNORMAL /LPF
GLUCOSE UR STRIP.AUTO-MCNC: NEGATIVE MG/DL
HGB UR QL STRIP: ABNORMAL
KETONES UR QL STRIP.AUTO: NEGATIVE MG/DL
LEUKOCYTE ESTERASE UR QL STRIP.AUTO: ABNORMAL
NITRITE UR QL STRIP.AUTO: POSITIVE
PH UR STRIP: 6.5 [PH] (ref 5–8)
PROT UR STRIP-MCNC: >300 MG/DL
RBC #/AREA URNS HPF: ABNORMAL /HPF (ref 0–5)
SP GR UR REFRACTOMETRY: 1.01 (ref 1–1.03)
UA: UC IF INDICATED,UAUC: ABNORMAL
UROBILINOGEN UR QL STRIP.AUTO: 0.2 EU/DL (ref 0.2–1)
WBC URNS QL MICRO: >100 /HPF (ref 0–4)

## 2020-06-25 PROCEDURE — 81001 URINALYSIS AUTO W/SCOPE: CPT

## 2020-06-25 PROCEDURE — 74011250637 HC RX REV CODE- 250/637: Performed by: EMERGENCY MEDICINE

## 2020-06-25 PROCEDURE — 87086 URINE CULTURE/COLONY COUNT: CPT

## 2020-06-25 PROCEDURE — 99283 EMERGENCY DEPT VISIT LOW MDM: CPT

## 2020-06-25 RX ORDER — PHENAZOPYRIDINE HYDROCHLORIDE 200 MG/1
200 TABLET, FILM COATED ORAL 3 TIMES DAILY
Qty: 6 TAB | Refills: 0 | Status: SHIPPED | OUTPATIENT
Start: 2020-06-25 | End: 2020-06-27

## 2020-06-25 RX ORDER — CEFUROXIME AXETIL 250 MG/1
250 TABLET ORAL DAILY
Qty: 7 TAB | Refills: 0 | Status: SHIPPED | OUTPATIENT
Start: 2020-06-25 | End: 2020-07-02

## 2020-06-25 RX ORDER — ACETAMINOPHEN 500 MG
1000 TABLET ORAL ONCE
Status: COMPLETED | OUTPATIENT
Start: 2020-06-25 | End: 2020-06-25

## 2020-06-25 RX ADMIN — ACETAMINOPHEN 1000 MG: 500 TABLET, FILM COATED ORAL at 09:01

## 2020-06-25 NOTE — DISCHARGE INSTRUCTIONS
Patient Education        Kidney Infection: Care Instructions  Your Care Instructions     A kidney infection (pyelonephritis) is a type of urinary tract infection, or UTI. Most UTIs are bladder infections. Kidney infections tend to make people much sicker than bladder infections do. A kidney infection is also more serious because it can cause lasting damage if it is not treated quickly. Follow-up care is a key part of your treatment and safety. Be sure to make and go to all appointments, and call your doctor if you are having problems. It's also a good idea to know your test results and keep a list of the medicines you take. How can you care for yourself at home? · Take your antibiotics as directed. Do not stop taking them just because you feel better. You need to take the full course of antibiotics. · Drink plenty of water, enough so that your urine is light yellow or clear like water. This may help wash out bacteria that are causing the infection. If you have kidney, heart, or liver disease and have to limit fluids, talk with your doctor before you increase the amount of fluids you drink. · Urinate often. Try to empty your bladder each time. · To relieve pain, take a hot shower or lay a heating pad (set on low) over your lower belly. Never go to sleep with a heating pad in place. Put a thin cloth between the heating pad and your skin. To help prevent kidney infections  · Drink plenty of water each day. This helps you urinate often, which clears bacteria from your system. If you have kidney, heart, or liver disease and have to limit fluids, talk with your doctor before you increase the amount of fluids you drink. · Urinate when you have the urge. Do not hold your urine for a long time. Urinate before you go to sleep. · If you have symptoms of a bladder infection, such as burning when you urinate or having to urinate often, call your doctor so you can treat the problem before it gets worse.  If you do not treat a bladder infection quickly, it can spread to the kidney. · Men should keep the tip of the penis clean. If you are a woman, keep these ideas in mind:  · Urinate right after you have sex. · Change sanitary pads often. Avoid douches, feminine hygiene sprays, and other feminine hygiene products that have deodorants. · After going to the bathroom, wipe from front to back. When should you call for help? Call your doctor now or seek immediate medical care if:  · You have symptoms that a kidney infection is getting worse. These may include:  ? Pain or burning when you urinate. ? A frequent need to urinate without being able to pass much urine. ? Pain in the flank, which is just below the rib cage and above the waist on either side of the back. ? Blood in the urine. ? A fever. · You are vomiting or nauseated. Watch closely for changes in your health, and be sure to contact your doctor if:  · You do not get better as expected. Where can you learn more? Go to http://roselyn-francia.info/  Enter C443 in the search box to learn more about \"Kidney Infection: Care Instructions. \"  Current as of: August 12, 2019               Content Version: 12.5  © 4610-8821 [employer]. Care instructions adapted under license by [employer] (which disclaims liability or warranty for this information). If you have questions about a medical condition or this instruction, always ask your healthcare professional. [place] [address] any warranty or liability for your use of this information.

## 2020-06-25 NOTE — ED TRIAGE NOTES
Pt gets dialysis and will drive himself after today's visit, he reports that he still makes urine and has a UTI, he has frequency and burning, worsening x 2 weeks or more.

## 2020-06-25 NOTE — ED NOTES
Patient presents to the ED with c/o right flank pain and burning with urination x3 weeks that has gradually become worse. Pt reports urinary frequency and was supposed to go to dialysis today at 5 am. Pt reports having a nephrologist.     Pt is alert and oriented. Pt skin is warm an dry. Pt is ambulatory independently. Emergency Department Nursing Plan of Care       The Nursing Plan of Care is developed from the Nursing assessment and Emergency Department Attending provider initial evaluation. The plan of care may be reviewed in the ED Provider note.     The Plan of Care was developed with the following considerations:   Patient / Family readiness to learn indicated by:verbalized understanding  Persons(s) to be included in education: patient  Barriers to Learning/Limitations:No    Signed     Kenyetta Reas    6/25/2020   8:11 AM

## 2020-06-25 NOTE — ED PROVIDER NOTES
EMERGENCY DEPARTMENT HISTORY AND PHYSICAL EXAM      Date: 6/25/2020  Patient Name: Shaina Marcano    History of Presenting Illness     Chief Complaint   Patient presents with    Urinary Pain     History Provided By: Patient    HPI: Shaina Marcano, 30 y.o. male with past medical history significant for DVT, ureterovesicular reflux, Crohn's disease, and end-stage renal disease who presents via private vehicle to the ED with cc of right flank pain, urinary frequency, and dysuria for the past 2 weeks that is progressively getting worse. Patient states that he was at dialysis on Tuesday and they started him on antibiotics with his dialysis. He reports her symptoms have progressively worsened over the past 2 days. He goes to dialysis on Tuesdays, Thursdays, and Saturdays. He denies any fevers, chills, nausea, vomiting, or diarrhea. PMHx: DVT, utero vesicular reflex, end-stage renal disease, and Crohn's disease  Social Hx: Former smoker, denies alcohol use, denies illegal drug use    PCP: Josias, MD Lui   Nephrologist: Bennett Osler    There are no other complaints, changes, or physical findings at this time. No current facility-administered medications on file prior to encounter. Current Outpatient Medications on File Prior to Encounter   Medication Sig Dispense Refill    cyclobenzaprine (FLEXERIL) 10 mg tablet Take 1 Tab by mouth three (3) times daily as needed for Muscle Spasm(s). 12 Tab 0    lidocaine (LIDODERM) 5 % Apply patch to the affected area for 12 hours a day and remove for 12 hours a day. 15 Each 0    ondansetron (ZOFRAN ODT) 4 mg disintegrating tablet Take 1 Tab by mouth every eight (8) hours as needed for Nausea. 10 Tab 0    [DISCONTINUED] cephALEXin (KEFLEX) 500 mg capsule Take 1 Cap by mouth two (2) times a day. 14 Cap 0    ergocalciferol (ERGOCALCIFEROL) 50,000 unit capsule Take 50,000 Units by mouth every Monday.       b complex-vitamin c-folic acid (NEPHROCAPS) 1 mg capsule Take 1 Cap by mouth daily. 30 Cap 0    epoetin tin-epbx (RETACRIT) 4,000 unit/mL injection 1 mL by SubCUTAneous route DIALYSIS MON, WED & FRI. Indications: anemia due to kidney failure, method of removing waste/poison from blood with dialysis 1 Vial 0    iron qvrenw-Z-B66-Ca-suc-stoma (CHROMAGEN) tablet Take 1 Tab by mouth daily. 30 Tab 1    sevelamer carbonate (RENVELA) 800 mg tab tab Take 1 Tab by mouth three (3) times daily (with meals). 80 Tab 3     Past History     Past Medical History:  Past Medical History:   Diagnosis Date    Acute renal failure (ARF) ([facility] [address] 7/30/2015    Chronic kidney disease     pt on hemodialysis d/t reflux    Congenital hydroureteronephrosis     Crohn disease ([facility] [address] [facility]     [facility]          ESRD (end stage renal disease) ([facility] [address]     on HD [id number]    Gastrointestinal disorder     Chron's    VUR (vesicoureteric reflux)      Past Surgical History:  Past Surgical History:   Procedure Laterality Date    HX OTHER SURGICAL  Pt has a filter for DVT's    HX OTHER SURGICAL      Marquis placed 3 weeks ago    HX VASCULAR ACCESS  1/14/14    CREATION LEFT UPPER ARM ARTERIO VENOUS GRAFT   (7mm PTFE)     Family History:  Family History   Problem Relation Age of Onset    Heart Disease Other     Kidney Disease Other         kidney stones    Diabetes Mother     Hypertension Mother      Social History:  Social History     Tobacco Use    Smoking status: Former Smoker    Smokeless tobacco: Never Used    Tobacco comment: quit about a year ago    Substance Use Topics    Alcohol use: No    Drug use: No     Allergies: Allergies   Allergen Reactions    Codeine Nausea and Vomiting    Iodinated Contrast Media Itching    Shellfish Derived Hives     And shrimp     Review of Systems   Review of Systems   Constitutional: Negative for chills and fever. HENT: Negative for congestion, rhinorrhea, sneezing and sore throat. Eyes: Negative for redness and visual disturbance. Respiratory: Negative for shortness of breath. Cardiovascular: Negative for chest pain and leg swelling. Gastrointestinal: Negative for abdominal pain, nausea and vomiting. Genitourinary: Positive for dysuria, flank pain and urgency. Negative for difficulty urinating and frequency. Urinary frequency   Musculoskeletal: Negative for back pain, myalgias and neck stiffness. Skin: Negative for rash. Neurological: Negative for dizziness, syncope, weakness and headaches. Hematological: Negative for adenopathy. All other systems reviewed and are negative. Physical Exam   Physical Exam  Vitals signs and nursing note reviewed. Constitutional:       Appearance: Normal appearance. He is well-developed. HENT:      Head: Normocephalic and atraumatic. Neck:      Musculoskeletal: Full passive range of motion without pain, normal range of motion and neck supple. Cardiovascular:      Rate and Rhythm: Normal rate and regular rhythm. Pulses: Normal pulses. Heart sounds: Normal heart sounds. No murmur. Arteriovenous access: right arteriovenous access is present. Pulmonary:      Effort: Pulmonary effort is normal. No respiratory distress. Breath sounds: Normal breath sounds. Chest:      Chest wall: No tenderness. Abdominal:      General: Bowel sounds are normal.      Palpations: Abdomen is soft. Tenderness: There is no abdominal tenderness. There is right CVA tenderness. There is no guarding or rebound. Skin:     General: Skin is warm and dry. Findings: No erythema or rash. Neurological:      Mental Status: He is alert and oriented to person, place, and time. Psychiatric:         Speech: Speech normal.         Behavior: Behavior normal.         Thought Content:  Thought content normal.         Judgment: Judgment normal.       Diagnostic Study Results   Labs -     Recent Results (from the past 12 hour(s))   URINALYSIS W/ REFLEX CULTURE    Collection Time: 06/25/20 7:52 AM   Result Value Ref Range    Color YELLOW/STRAW      Appearance TURBID (A) CLEAR      Specific gravity 1.010 1.003 - 1.030      pH (UA) 6.5 5.0 - 8.0      Protein >300 (A) NEG mg/dL    Glucose Negative NEG mg/dL    Ketone Negative NEG mg/dL    Bilirubin Negative NEG      Blood MODERATE (A) NEG      Urobilinogen 0.2 0.2 - 1.0 EU/dL    Nitrites Positive (A) NEG      Leukocyte Esterase LARGE (A) NEG      WBC >100 (H) 0 - 4 /hpf    RBC 5-10 0 - 5 /hpf    Epithelial cells FEW FEW /lpf    Bacteria 1+ (A) NEG /hpf    UA:UC IF INDICATED URINE CULTURE ORDERED (A) CNI         Radiologic Studies -   No orders to display     No results found. Medical Decision Making   I am the first provider for this patient. I reviewed the vital signs, available nursing notes, past medical history, past surgical history, family history and social history. Vital Signs-Reviewed the patient's vital signs. Patient Vitals for the past 24 hrs:   Temp Pulse Resp BP SpO2   06/25/20 0745 98.9 °F (37.2 °C) 89 16 125/70 99 %     Pulse Oximetry Analysis - 99% on RA    Records Reviewed: Nursing Notes, Old Medical Records and Previous Laboratory Studies    Provider Notes (Medical Decision Making):   29-year-old male with end-stage renal disease presents to the emergency department with a 2 to 3-week history of progressively worsening CVA tenderness and dysuria. Differential includes pyelonephritis. I have spoken to his nephrologist who recommends we put him on Ceftin 250 mg daily for 7 days and have him follow-up as an outpatient. ED Course:   Initial assessment performed. The patients presenting problems have been discussed, and they are in agreement with the care plan formulated and outlined with them. I have encouraged them to ask questions as they arise throughout their visit. 8:30 AM  Dahlia Langley MD spoke with Nany Winston, Consult for Nephrology. Discussed HPI and PE, available diagnostic tests and clinical findings.  He is in agreement with care plans as outlined. He recommends putting him on 250 mg of Ceftin daily for the next 7 days and having him follow-up in dialysis today. He also reports that he misses dialysis frequently and he may not be getting his antibiotics regularly as he is supposed to. Progress Note:   Updated pt on all returned results and findings. Discussed the importance of proper follow up as referred below along with return precautions. Pt in agreement with the care plan and expresses agreement with and understanding of all items discussed. Disposition:  Discharge Note:  The pt is ready for discharge. The pt's signs, symptoms, diagnosis, and discharge instructions have been discussed and pt has conveyed their understanding. The pt is to follow up as recommended or return to ER should their symptoms worsen. Plan has been discussed and pt is in agreement. PLAN:  1. Current Discharge Medication List      START taking these medications    Details   cefUROXime (CEFTIN) 250 mg tablet Take 1 Tab by mouth daily for 7 days. Qty: 7 Tab, Refills: 0         STOP taking these medications       cephALEXin (KEFLEX) 500 mg capsule Comments:   Reason for Stoppin.   Follow-up Information     Follow up With Specialties Details Why Contact Info    Claudia Ha MD Nephrology Schedule an appointment as soon as possible for a visit   [address]      Dialysis  Go today          Return to ED if worse     Diagnosis     Clinical Impression:   1. Pyelonephritis            Please note that this dictation was completed with Dragon, computer voice recognition software. Quite often unanticipated grammatical, syntax, homophones, and other interpretive errors are inadvertently transcribed by the computer software. Please disregard these errors. Additionally, please excuse any errors that have escaped final proofreading.

## 2020-06-26 LAB
BACTERIA SPEC CULT: ABNORMAL
CC UR VC: ABNORMAL
SERVICE CMNT-IMP: ABNORMAL

## 2020-07-03 ENCOUNTER — HOSPITAL ENCOUNTER (EMERGENCY)
Age: 35
Discharge: HOME | End: 2020-07-03
Attending: EMERGENCY MEDICINE
Payer: SELF-PAY

## 2020-07-03 ENCOUNTER — APPOINTMENT (OUTPATIENT)
Dept: GENERAL RADIOLOGY | Age: 35
End: 2020-07-03
Attending: EMERGENCY MEDICINE
Payer: SELF-PAY

## 2020-07-03 VITALS
SYSTOLIC BLOOD PRESSURE: 112 MMHG | HEART RATE: 58 BPM | HEIGHT: 66 IN | OXYGEN SATURATION: 99 % | TEMPERATURE: 98 F | DIASTOLIC BLOOD PRESSURE: 70 MMHG | WEIGHT: 159.72 LBS | BODY MASS INDEX: 25.67 KG/M2 | RESPIRATION RATE: 16 BRPM

## 2020-07-03 DIAGNOSIS — R07.9 ACUTE CHEST PAIN: ICD-10-CM

## 2020-07-03 DIAGNOSIS — E83.51 HYPOCALCEMIA: Primary | ICD-10-CM

## 2020-07-03 DIAGNOSIS — R07.89 ATYPICAL CHEST PAIN: ICD-10-CM

## 2020-07-03 DIAGNOSIS — N18.6 ESRD ON DIALYSIS (HCC): ICD-10-CM

## 2020-07-03 DIAGNOSIS — Z99.2 ESRD ON DIALYSIS (HCC): ICD-10-CM

## 2020-07-03 LAB
ALBUMIN SERPL-MCNC: 3.6 G/DL (ref 3.5–5)
ALBUMIN/GLOB SERPL: 0.9 {RATIO} (ref 1.1–2.2)
ALP SERPL-CCNC: 79 U/L (ref 45–117)
ALT SERPL-CCNC: 17 U/L (ref 12–78)
ANION GAP SERPL CALC-SCNC: 13 MMOL/L (ref 5–15)
AST SERPL-CCNC: 21 U/L (ref 15–37)
BASOPHILS # BLD: 0 K/UL (ref 0–0.1)
BASOPHILS NFR BLD: 1 % (ref 0–1)
BILIRUB SERPL-MCNC: 0.4 MG/DL (ref 0.2–1)
BUN SERPL-MCNC: 53 MG/DL (ref 6–20)
BUN/CREAT SERPL: 5 (ref 12–20)
CALCIUM SERPL-MCNC: 6.1 MG/DL (ref 8.5–10.1)
CHLORIDE SERPL-SCNC: 103 MMOL/L (ref 97–108)
CK MB CFR SERPL CALC: 0.2 % (ref 0–2.5)
CK MB SERPL-MCNC: 1.4 NG/ML (ref 5–25)
CK SERPL-CCNC: 680 U/L (ref 39–308)
CO2 SERPL-SCNC: 24 MMOL/L (ref 21–32)
CREAT SERPL-MCNC: 10.77 MG/DL (ref 0.7–1.3)
DIFFERENTIAL METHOD BLD: ABNORMAL
EOSINOPHIL # BLD: 0.2 K/UL (ref 0–0.4)
EOSINOPHIL NFR BLD: 2 % (ref 0–7)
ERYTHROCYTE [DISTWIDTH] IN BLOOD BY AUTOMATED COUNT: 13.1 % (ref 11.5–14.5)
GLOBULIN SER CALC-MCNC: 4.2 G/DL (ref 2–4)
GLUCOSE SERPL-MCNC: 129 MG/DL (ref 65–100)
HCT VFR BLD AUTO: 33.4 % (ref 36.6–50.3)
HGB BLD-MCNC: 10.9 G/DL (ref 12.1–17)
IMM GRANULOCYTES # BLD AUTO: 0 K/UL (ref 0–0.04)
IMM GRANULOCYTES NFR BLD AUTO: 0 % (ref 0–0.5)
LYMPHOCYTES # BLD: 2 K/UL (ref 0.8–3.5)
LYMPHOCYTES NFR BLD: 25 % (ref 12–49)
MCH RBC QN AUTO: 30.9 PG (ref 26–34)
MCHC RBC AUTO-ENTMCNC: 32.6 G/DL (ref 30–36.5)
MCV RBC AUTO: 94.6 FL (ref 80–99)
MONOCYTES # BLD: 0.7 K/UL (ref 0–1)
MONOCYTES NFR BLD: 9 % (ref 5–13)
NEUTS SEG # BLD: 5.1 K/UL (ref 1.8–8)
NEUTS SEG NFR BLD: 63 % (ref 32–75)
NRBC # BLD: 0 K/UL (ref 0–0.01)
NRBC BLD-RTO: 0 PER 100 WBC
PLATELET # BLD AUTO: 154 K/UL (ref 150–400)
PMV BLD AUTO: 9.6 FL (ref 8.9–12.9)
POTASSIUM SERPL-SCNC: 3.8 MMOL/L (ref 3.5–5.1)
PROT SERPL-MCNC: 7.8 G/DL (ref 6.4–8.2)
RBC # BLD AUTO: 3.53 M/UL (ref 4.1–5.7)
SODIUM SERPL-SCNC: 140 MMOL/L (ref 136–145)
TROPONIN I SERPL-MCNC: <0.05 NG/ML
WBC # BLD AUTO: 8.1 K/UL (ref 4.1–11.1)

## 2020-07-03 PROCEDURE — 84484 ASSAY OF TROPONIN QUANT: CPT

## 2020-07-03 PROCEDURE — 96376 TX/PRO/DX INJ SAME DRUG ADON: CPT

## 2020-07-03 PROCEDURE — 74011250636 HC RX REV CODE- 250/636: Performed by: EMERGENCY MEDICINE

## 2020-07-03 PROCEDURE — 82550 ASSAY OF CK (CPK): CPT

## 2020-07-03 PROCEDURE — 82553 CREATINE MB FRACTION: CPT

## 2020-07-03 PROCEDURE — 93005 ELECTROCARDIOGRAM TRACING: CPT

## 2020-07-03 PROCEDURE — 36415 COLL VENOUS BLD VENIPUNCTURE: CPT

## 2020-07-03 PROCEDURE — 96374 THER/PROPH/DIAG INJ IV PUSH: CPT

## 2020-07-03 PROCEDURE — 99284 EMERGENCY DEPT VISIT MOD MDM: CPT

## 2020-07-03 PROCEDURE — 85025 COMPLETE CBC W/AUTO DIFF WBC: CPT

## 2020-07-03 PROCEDURE — 80053 COMPREHEN METABOLIC PANEL: CPT

## 2020-07-03 PROCEDURE — 71045 X-RAY EXAM CHEST 1 VIEW: CPT

## 2020-07-03 PROCEDURE — 96375 TX/PRO/DX INJ NEW DRUG ADDON: CPT

## 2020-07-03 RX ORDER — CALCIUM GLUCONATE 94 MG/ML
1 INJECTION, SOLUTION INTRAVENOUS
Status: COMPLETED | OUTPATIENT
Start: 2020-07-03 | End: 2020-07-03

## 2020-07-03 RX ORDER — KETOROLAC TROMETHAMINE 30 MG/ML
15 INJECTION, SOLUTION INTRAMUSCULAR; INTRAVENOUS
Status: COMPLETED | OUTPATIENT
Start: 2020-07-03 | End: 2020-07-03

## 2020-07-03 RX ORDER — FOLIC ACID/MULTIVIT,IRON,MINER 0.4MG-18MG
400 TABLET ORAL DAILY
Qty: 10 TAB | Refills: 0 | Status: SHIPPED | OUTPATIENT
Start: 2020-07-03 | End: 2020-11-09

## 2020-07-03 RX ORDER — CALCITRIOL 0.25 UG/1
0.25 CAPSULE ORAL DAILY
Qty: 10 CAP | Refills: 0 | Status: SHIPPED | OUTPATIENT
Start: 2020-07-03 | End: 2020-11-09

## 2020-07-03 RX ADMIN — CALCIUM GLUCONATE 1 G: 98 INJECTION, SOLUTION INTRAVENOUS at 21:43

## 2020-07-03 RX ADMIN — CALCIUM GLUCONATE 1 G: 98 INJECTION, SOLUTION INTRAVENOUS at 21:33

## 2020-07-03 RX ADMIN — KETOROLAC TROMETHAMINE 15 MG: 30 INJECTION, SOLUTION INTRAMUSCULAR; INTRAVENOUS at 22:33

## 2020-07-03 NOTE — ED TRIAGE NOTES
Patient states sent her per Dr. Osvaldo Cody. Malka relays that \" my calcium was high and my doctor told me I need to come to the ER and get it taken care of\".  Patient on hemodialysis at [facility]

## 2020-07-04 NOTE — ED PROVIDER NOTES
EMERGENCY DEPARTMENT HISTORY AND PHYSICAL EXAM      Please note that this dictation was completed with [employer], the computer voice recognition software. Quite often unanticipated grammatical, syntax, homophones, and other interpretive errors are inadvertently transcribed by the computer software. Please disregard these errors and any errors that have escaped final proofreading. Thank you. Date: 7/3/2020  Patient Name: Ganesh Padilla  Patient Age and Sex: 30 y.o. male    History of Presenting Illness     Chief Complaint   Patient presents with    Abnormal Lab Results       History Provided By: Patient    HPI: Ganesh Padilla, 30 y.o. male with past medical history as documented below presents to the ED with c/o of abnormal lab results as well as 24 hours of constant, chest pressure. Patient states that he is on dialysis, currently on Tuesday Thursday and Saturday dialysis. Patient normally gets dialyzed at [facility]. He states that he was had a full session yesterday during dialysis. However, towards the end of dialysis he noted sudden onset of left-sided chest discomfort. He describes the pain as pressure-like, 3 out of 10 intensity. He reports pain is worse with movement outpatient. He reports pain has been constant since initial onset. He also states that he had lab work done yesterday and he received a call from his nephrologist stating that he had abnormal labs, possibly elevated potassium. He states that he still makes urine. He denies any shortness of breath, lower extremity edema or other complaints at this time. Pt denies any other alleviating or exacerbating factors. Additionally, pt specifically denies any recent fever, chills, headache, nausea, vomiting, abdominal pain, SOB, lightheadedness, dizziness, numbness, weakness, lower extremity swelling, heart palpitations, urinary sxs, diarrhea, constipation, melena, hematochezia, cough, or congestion.      There are no other complaints, changes or physical findings at this time. PCP: Other, MD Lui    Past History   Past Medical History:  Past Medical History:   Diagnosis Date    Acute renal failure (ARF) ([facility] 7/30/2015    Chronic kidney disease     pt on hemodialysis d/t reflux    Congenital hydroureteronephrosis     Crohn disease ([facility]     DVT (deep venous thrombosis) (HCC)          ESRD (end stage renal disease) ([facility] [address] [facility]     on HD [id number]    Gastrointestinal disorder     Chron's    VUR (vesicoureteric reflux)        Past Surgical History:  Past Surgical History:   Procedure Laterality Date    HX OTHER SURGICAL  Pt has a filter for DVT's    HX OTHER SURGICAL      Marquis placed 3 weeks ago    HX VASCULAR ACCESS  1/14/14    CREATION LEFT UPPER ARM ARTERIO VENOUS GRAFT   (7mm PTFE)       Family History:  Family History   Problem Relation Age of Onset    Heart Disease Other     Kidney Disease Other         kidney stones    Diabetes Mother     Hypertension Mother        Social History:  Social History     Tobacco Use    Smoking status: Former Smoker    Smokeless tobacco: Never Used    Tobacco comment: quit about a year ago    Substance Use Topics    Alcohol use: No    Drug use: No       Allergies: Allergies   Allergen Reactions    Codeine Nausea and Vomiting    Iodinated Contrast Media Itching    Shellfish Derived Hives     And shrimp       Current Medications:  No current facility-administered medications on file prior to encounter. Current Outpatient Medications on File Prior to Encounter   Medication Sig Dispense Refill    cyclobenzaprine (FLEXERIL) 10 mg tablet Take 1 Tab by mouth three (3) times daily as needed for Muscle Spasm(s). 12 Tab 0    ergocalciferol (ERGOCALCIFEROL) 50,000 unit capsule Take 50,000 Units by mouth every Monday.  b complex-vitamin c-folic acid (NEPHROCAPS) 1 mg capsule Take 1 Cap by mouth daily.  30 Cap 0    epoetin tin-epbx (RETACRIT) 4,000 unit/mL injection 1 mL by SubCUTAneous route DIALYSIS MON, WED & FRI. Indications: anemia due to kidney failure, method of removing waste/poison from blood with dialysis 1 Vial 0    iron wwrdng-E-Q07-Ca-suc-stoma (CHROMAGEN) tablet Take 1 Tab by mouth daily. 30 Tab 1    sevelamer carbonate (RENVELA) 800 mg tab tab Take 1 Tab by mouth three (3) times daily (with meals). 90 Tab 3    lidocaine (LIDODERM) 5 % Apply patch to the affected area for 12 hours a day and remove for 12 hours a day. 15 Each 0    ondansetron (ZOFRAN ODT) 4 mg disintegrating tablet Take 1 Tab by mouth every eight (8) hours as needed for Nausea. 10 Tab 0       Review of Systems   Review of Systems   Constitutional: Negative. Negative for chills and fever. HENT: Negative. Negative for congestion, facial swelling, rhinorrhea, sore throat, trouble swallowing and voice change. Eyes: Negative. Respiratory: Positive for chest tightness. Negative for apnea, cough, shortness of breath and wheezing. Cardiovascular: Positive for chest pain. Negative for palpitations and leg swelling. Gastrointestinal: Negative. Negative for abdominal distention, abdominal pain, blood in stool, constipation, diarrhea, nausea and vomiting. Endocrine: Negative. Negative for cold intolerance, heat intolerance and polyuria. Genitourinary: Negative. Negative for difficulty urinating, dysuria, flank pain, frequency, hematuria and urgency. Musculoskeletal: Negative. Negative for arthralgias, back pain, myalgias, neck pain and neck stiffness. Skin: Negative. Negative for color change and rash. Neurological: Negative. Negative for dizziness, syncope, facial asymmetry, speech difficulty, weakness, light-headedness, numbness and headaches. Hematological: Negative. Does not bruise/bleed easily. Psychiatric/Behavioral: Negative. Negative for confusion and self-injury. The patient is not nervous/anxious. Physical Exam   Physical Exam  Vitals signs and nursing note reviewed. Constitutional:       Appearance: He is well-developed. He is not toxic-appearing. HENT:      Head: Normocephalic and atraumatic. Mouth/Throat:      Pharynx: No posterior oropharyngeal erythema. Eyes:      Conjunctiva/sclera: Conjunctivae normal.      Pupils: Pupils are equal, round, and reactive to light. Neck:      Musculoskeletal: Normal range of motion. Cardiovascular:      Rate and Rhythm: Normal rate and regular rhythm. Heart sounds: Normal heart sounds. No murmur. No friction rub. No gallop. Pulmonary:      Effort: Pulmonary effort is normal. No respiratory distress. Breath sounds: Normal breath sounds. No wheezing or rales. Chest:      Chest wall: Tenderness (Reproducible chest wall tenderness, no rash noted) present. Abdominal:      General: Bowel sounds are normal. There is no distension. Palpations: Abdomen is soft. There is no mass. Tenderness: There is no abdominal tenderness. There is no guarding or rebound. Musculoskeletal: Normal range of motion. General: No tenderness or deformity. Comments: Right upper extremity AV fistula with good thrill   Skin:     General: Skin is warm. Findings: No rash. Neurological:      Mental Status: He is alert and oriented to person, place, and time. Cranial Nerves: No cranial nerve deficit. Motor: No abnormal muscle tone. Coordination: Coordination normal.      Deep Tendon Reflexes: Reflexes normal.   Psychiatric:         Behavior: Behavior is cooperative.          Diagnostic Study Results     Labs -  Recent Results (from the past 24 hour(s))   CK W/ REFLX CKMB    Collection Time: 07/03/20  7:36 PM   Result Value Ref Range     (H) 39 - 390 U/L   METABOLIC PANEL, COMPREHENSIVE    Collection Time: 07/03/20  7:36 PM   Result Value Ref Range    Sodium 140 136 - 145 mmol/L    Potassium 3.8 3.5 - 5.1 mmol/L    Chloride 103 97 - 108 mmol/L    CO2 24 21 - 32 mmol/L    Anion gap 13 5 - 15 mmol/L Glucose 129 (H) 65 - 100 mg/dL    BUN 53 (H) 6 - 20 MG/DL    Creatinine 10.77 (H) 0.70 - 1.30 MG/DL    BUN/Creatinine ratio 5 (L) 12 - 20      GFR est AA 7 (L) >60 ml/min/1.73m2    GFR est non-AA 6 (L) >60 ml/min/1.73m2    Calcium 6.1 (LL) 8.5 - 10.1 MG/DL    Bilirubin, total 0.4 0.2 - 1.0 MG/DL    ALT (SGPT) 17 12 - 78 U/L    AST (SGOT) 21 15 - 37 U/L    Alk. phosphatase 79 45 - 117 U/L    Protein, total 7.8 6.4 - 8.2 g/dL    Albumin 3.6 3.5 - 5.0 g/dL    Globulin 4.2 (H) 2.0 - 4.0 g/dL    A-G Ratio 0.9 (L) 1.1 - 2.2     TROPONIN I    Collection Time: 07/03/20  7:36 PM   Result Value Ref Range    Troponin-I, Qt. <0.05 <0.05 ng/mL   CK-MB,QUANT. Collection Time: 07/03/20  7:36 PM   Result Value Ref Range    CK - MB 1.4 <3.6 NG/ML    CK-MB Index 0.2 0.0 - 2.5     CBC WITH AUTOMATED DIFF    Collection Time: 07/03/20  7:51 PM   Result Value Ref Range    WBC 8.1 4.1 - 11.1 K/uL    RBC 3.53 (L) 4.10 - 5.70 M/uL    HGB 10.9 (L) 12.1 - 17.0 g/dL    HCT 33.4 (L) 36.6 - 50.3 %    MCV 94.6 80.0 - 99.0 FL    MCH 30.9 26.0 - 34.0 PG    MCHC 32.6 30.0 - 36.5 g/dL    RDW 13.1 11.5 - 14.5 %    PLATELET 566 739 - 093 K/uL    MPV 9.6 8.9 - 12.9 FL    NRBC 0.0 0  WBC    ABSOLUTE NRBC 0.00 0.00 - 0.01 K/uL    NEUTROPHILS 63 32 - 75 %    LYMPHOCYTES 25 12 - 49 %    MONOCYTES 9 5 - 13 %    EOSINOPHILS 2 0 - 7 %    BASOPHILS 1 0 - 1 %    IMMATURE GRANULOCYTES 0 0.0 - 0.5 %    ABS. NEUTROPHILS 5.1 1.8 - 8.0 K/UL    ABS. LYMPHOCYTES 2.0 0.8 - 3.5 K/UL    ABS. MONOCYTES 0.7 0.0 - 1.0 K/UL    ABS. EOSINOPHILS 0.2 0.0 - 0.4 K/UL    ABS. BASOPHILS 0.0 0.0 - 0.1 K/UL    ABS. IMM.  GRANS. 0.0 0.00 - 0.04 K/UL    DF AUTOMATED     EKG, 12 LEAD, INITIAL    Collection Time: 07/03/20  7:55 PM   Result Value Ref Range    Ventricular Rate 65 BPM    Atrial Rate 65 BPM    P-R Interval 144 ms    QRS Duration 100 ms    Q-T Interval 452 ms    QTC Calculation (Bezet) 470 ms    Calculated P Axis 73 degrees    Calculated R Axis 17 degrees Calculated T Axis 73 degrees    Diagnosis       Normal sinus rhythm  Nonspecific T wave abnormality  Prolonged QT  Abnormal ECG  When compared with ECG of 28-AUG-2019 13:49,  Nonspecific T wave abnormality no longer evident in Inferior leads         Radiologic Studies -   XR CHEST PORT   Final Result   IMPRESSION:   No acute process. CT Results  (Last 48 hours)    None        CXR Results  (Last 48 hours)               07/03/20 2008  XR CHEST PORT Final result    Impression:  IMPRESSION:   No acute process. Narrative:  INDICATION: chest pain       EXAM:  AP CHEST RADIOGRAPH       COMPARISON: August 28, 2019       FINDINGS:       AP portable view of the chest demonstrates a normal cardiomediastinal   silhouette. There is no edema, effusion, consolidation, or pneumothorax. Left   basilar lucency at the costophrenic angle felt to be related to external soft   tissues, similar to prior study when there is an area of scarring in this   region. Vascular stents noted in the left arm. The osseous structures are   unremarkable. Medical Decision Making   I am the first provider for this patient. I reviewed the vital signs, available nursing notes, past medical history, past surgical history, family history and social history. Vital Signs-Reviewed the patient's vital signs. Patient Vitals for the past 24 hrs:   Temp Pulse Resp BP SpO2   07/03/20 2223  (!) 58 16 112/70 99 %   07/03/20 1931 98 °F (36.7 °C) 71 18 111/64 98 %       Pulse Oximetry Analysis - 98% on RA    Cardiac Monitor:   Rate: 71 bpm  Rhythm: Normal Sinus Rhythm      ED EKG interpretation:  Rhythm: normal sinus rhythm; and regular . Rate (approx.): 65; Axis: normal; P wave: normal; QRS interval: normal ; ST/T wave: normal; Other findings: normal. This EKG was interpreted by Pablo Vann M.D.     Records Reviewed: Nursing Notes, Old Medical Records, Previous electrocardiograms, Previous Radiology Studies and Previous Laboratory Studies    Provider Notes (Medical Decision Making):   DDx includes STEMI, NSTEMI, Angina, PE, Aortic Pathology, Chest Wall Pain, Pleurisy, Pneumonia, GERD/esophagitis, Anxiety. No cough/fever or focal lung findings to suggest pneumonia. No tachycardia, hypoxia or pleuritic component to suggest PE. Pulses symmetric and no extremely elevated BP/asymmetry or classic tearing sensation to suggest Aortic Dissection. Also, no neuro findings. No wretching/forceful vomiting to suggest esophageal disaster. Denies IV drug abuse, has native valves, no fevers/murmurs or skin lesions to suggest endocarditis. Will evaluate with EKG, labs, cardiac enzymes, chest x-ray. Will provide pain control and reassess. ED Course:   Initial assessment performed. The patients presenting problems have been discussed, and they are in agreement with the care plan formulated and outlined with them. I have encouraged them to ask questions as they arise throughout their visit. I reviewed our electronic medical record system for any past medical records that were available that may contribute to the patient's current condition, the nursing notes and vital signs from today's visit. Constance Burton MD    ED Orders Placed :  Orders Placed This Encounter    XR CHEST PORT    CBC WITH AUTOMATED DIFF    CK W/REFLEX CKMB    METABOLIC PANEL, COMPREHENSIVE    TROPONIN I    CK-MB,QUANT.     CARDIAC/RESPIRATORY MONITORING    IP CONSULT TO NEPHROLOGY    EKG 12 LEAD INITIAL    INSERT PERIPHERAL IV ONE TIME STAT    calcium gluconate injection 1 g    calcium gluconate injection 1 g    calcitRIOL (ROCALTROL) 0.25 mcg capsule    cholecalciferol (Vitamin D3) 10 mcg (400 unit) chew chewable tablet    ketorolac (TORADOL) injection 15 mg     ED Medications Administered:  Medications   calcium gluconate injection 1 g (1 g IntraVENous Given 7/3/20 2143)   calcium gluconate injection 1 g (1 g IntraVENous Given 7/3/20 2133)   ketorolac (TORADOL) injection 15 mg (15 mg IntraVENous Given 7/3/20 9150)         Consult Note:  Miguel Angel Beckman MD spoke with Ruthy Bryan MD,   Specialty: Nephrology  Discussed pt's hx, disposition, and available diagnostic and imaging results. Reviewed care plans. Agree with management and plan thus far. Agree with 2g IV calcium gluconate, recommend discharge home with Calcitriol and Vitamin D supplements. Progress Note:  Patient has been reassessed and reports feeling better and symptoms have improved significantly after ED treatment. Patient feels comfortable going home with close follow-up. Kyle Ochoa's final labs and imaging have been reviewed with him and available family and/or caregiver. They have been counseled regarding his diagnosis. He verbally conveys understanding and agreement of the signs, symptoms, diagnosis, treatment and prognosis and additionally agrees to follow up as recommended with Dr. Abby Naylor MD and/or specialist in 24 - 48 hours. He also agrees with the care-plan we created together and conveys that all of his questions have been answered. I have also put together some discharge instructions for him that include: 1) educational information regarding their diagnosis, 2) how to care for their diagnosis at home, as well a 3) list of reasons why they would want to return to the ED prior to their follow-up appointment should the patient's condition change or symptoms worsen. I have answered all questions to the patient's satisfaction. Strict return precautions given. He both understood and agreed with plan as discussed. Vital signs stable for discharge. Pt very appreciative of care today. Disposition: Discharge  The pt is ready for discharge. The pt's signs, symptoms, diagnosis, and discharge instructions have been discussed and pt has conveyed their understanding. The pt is to follow up as recommended or return to ER should their symptoms worsen.  Plan has been discussed and pt is in full agreement. Plan:  1. Return precautions as discussed. 2.   Discharge Medication List as of 7/3/2020  9:51 PM      START taking these medications    Details   calcitRIOL (ROCALTROL) 0.25 mcg capsule Take 1 Cap by mouth daily. , Print, Disp-10 Cap, R-0      cholecalciferol (Vitamin D3) 10 mcg (400 unit) chew chewable tablet Take 1 Tab by mouth daily. , Print, Disp-10 Tab, R-0         CONTINUE these medications which have NOT CHANGED    Details   cyclobenzaprine (FLEXERIL) 10 mg tablet Take 1 Tab by mouth three (3) times daily as needed for Muscle Spasm(s). , Print, Disp-12 Tab, R-0      ergocalciferol (ERGOCALCIFEROL) 50,000 unit capsule Take 50,000 Units by mouth every Monday., Historical Med      b complex-vitamin c-folic acid (NEPHROCAPS) 1 mg capsule Take 1 Cap by mouth daily. , Print, Disp-30 Cap, R-0      epoetin tin-epbx (RETACRIT) 4,000 unit/mL injection 1 mL by SubCUTAneous route DIALYSIS MON, WED & FRI. Indications: anemia due to kidney failure, method of removing waste/poison from blood with dialysis, No Print, Disp-1 Vial, R-0      iron jamggk-Q-W55-Ca-suc-stoma (CHROMAGEN) tablet Take 1 Tab by mouth daily. , Print, Disp-30 Tab, R-1      sevelamer carbonate (RENVELA) 800 mg tab tab Take 1 Tab by mouth three (3) times daily (with meals). , Print, Disp-90 Tab, R-3      lidocaine (LIDODERM) 5 % Apply patch to the affected area for 12 hours a day and remove for 12 hours a day., Print, Disp-15 Each, R-0      ondansetron (ZOFRAN ODT) 4 mg disintegrating tablet Take 1 Tab by mouth every eight (8) hours as needed for Nausea. , Print, Disp-10 Tab, R-0         STOP taking these medications       cefUROXime (CEFTIN) 250 mg tablet Comments:   Reason for Stopping:             3.   Follow-up Information     Follow up With Specialties Details Why [address] EMERGENCY DEPT Emergency Medicine  As needed, If symptoms worsen [address] [facility]    Terrence Gooden MD Nephrology Schedule an appointment as soon as possible for a visit As needed, If symptoms worsen [address] [phone]            Instructed to return to ED if worse  Diagnosis     Clinical Impression:   1. Hypocalcemia    2. ESRD on dialysis ([address]    3. Atypical chest pain    4. Acute chest pain      Attestation:  Ro Britt MD, am the attending of record for this patient. I personally performed the services described in this documentation on this date, 7/3/2020 for patientAnnabel. I have reviewed the chart and verified that the record is accurate and complete. This note will not be viewable in [address]

## 2020-07-04 NOTE — DISCHARGE INSTRUCTIONS
Thank you for allowing us to take care of you today! We hope we addressed all of your concerns and needs. We strive to provide excellent quality care in the Emergency Department. You will receive a survey after your visit to evaluate the care you were provided. Should you receive a survey from us, we invite you to share your experience and tell us what made it excellent. It was a pleasure serving you, we invite you to share your experience with us, in our pursuit for excellence, should you be selected to receive a survey. The exam and treatment you received in the Emergency Department were for an urgent problem and are not intended as complete care. It is important that you follow up with a doctor, nurse practitioner, or physician assistant for ongoing care. If your symptoms become worse or you do not improve as expected and you are unable to reach your usual health care provider, you should return to the Emergency Department. We are available 24 hours a day. Please take your discharge instructions with you when you go to your follow-up appointment. If you have any problem arranging a follow-up appointment, contact the Emergency Department immediately. If a prescription has been provided, please have it filled as soon as possible to prevent a delay in treatment. Read the entire medication instruction sheet provided to you by the pharmacy. If you have any questions or reservations about taking the medication due to side effects or interactions with other medications, please call your primary care physician or contact the ER to speak with the charge nurse. Make an appointment with your family doctor or the physician you were referred to for follow-up of this visit as instructed on your discharge paperwork, as this is mandatory follow-up. Return to the ER if you are unable to be seen or if you are unable to be seen in a timely manner.     If you have any problem arranging the follow-up visit, contact the Emergency Department immediately. I hope you feel better and thank you again for allow us to provide you with excellent care today at [place] [address].! Warmest regards,    Nupur Bearden MD  Emergency Medicine Physician  [place] [address].      _____________________________________________________________________________________________________________    Vitals:    07/03/20 1931   BP: 111/64   BP Patient Position: At rest   Pulse: 71   Resp: 18   Temp: 98 °F (36.7 °C)   SpO2: 98%   Weight: 72.4 kg (159 lb 11.6 oz)   Height: 5' 6\" (1.676 m)       Recent Results (from the past 12 hour(s))   CK W/ REFLX CKMB    Collection Time: 07/03/20  7:36 PM   Result Value Ref Range     (H) 39 - 554 U/L   METABOLIC PANEL, COMPREHENSIVE    Collection Time: 07/03/20  7:36 PM   Result Value Ref Range    Sodium 140 136 - 145 mmol/L    Potassium 3.8 3.5 - 5.1 mmol/L    Chloride 103 97 - 108 mmol/L    CO2 24 21 - 32 mmol/L    Anion gap 13 5 - 15 mmol/L    Glucose 129 (H) 65 - 100 mg/dL    BUN 53 (H) 6 - 20 MG/DL    Creatinine 10.77 (H) 0.70 - 1.30 MG/DL    BUN/Creatinine ratio 5 (L) 12 - 20      GFR est AA 7 (L) >60 ml/min/1.73m2    GFR est non-AA 6 (L) >60 ml/min/1.73m2    Calcium 6.1 (LL) 8.5 - 10.1 MG/DL    Bilirubin, total 0.4 0.2 - 1.0 MG/DL    ALT (SGPT) 17 12 - 78 U/L    AST (SGOT) 21 15 - 37 U/L    Alk. phosphatase 79 45 - 117 U/L    Protein, total 7.8 6.4 - 8.2 g/dL    Albumin 3.6 3.5 - 5.0 g/dL    Globulin 4.2 (H) 2.0 - 4.0 g/dL    A-G Ratio 0.9 (L) 1.1 - 2.2     TROPONIN I    Collection Time: 07/03/20  7:36 PM   Result Value Ref Range    Troponin-I, Qt. <0.05 <0.05 ng/mL   CK-MB,QUANT.     Collection Time: 07/03/20  7:36 PM   Result Value Ref Range    CK - MB 1.4 <3.6 NG/ML    CK-MB Index 0.2 0.0 - 2.5     CBC WITH AUTOMATED DIFF    Collection Time: 07/03/20  7:51 PM   Result Value Ref Range    WBC 8.1 4.1 - 11.1 K/uL    RBC 3.53 (L) 4.10 - 5.70 M/uL    HGB 10.9 (L) 12.1 - 17.0 g/dL    HCT 33.4 (L) 36.6 - 50.3 %    MCV 94.6 80.0 - 99.0 FL    MCH 30.9 26.0 - 34.0 PG    MCHC 32.6 30.0 - 36.5 g/dL    RDW 13.1 11.5 - 14.5 %    PLATELET 104 357 - 338 K/uL    MPV 9.6 8.9 - 12.9 FL    NRBC 0.0 0  WBC    ABSOLUTE NRBC 0.00 0.00 - 0.01 K/uL    NEUTROPHILS 63 32 - 75 %    LYMPHOCYTES 25 12 - 49 %    MONOCYTES 9 5 - 13 %    EOSINOPHILS 2 0 - 7 %    BASOPHILS 1 0 - 1 %    IMMATURE GRANULOCYTES 0 0.0 - 0.5 %    ABS. NEUTROPHILS 5.1 1.8 - 8.0 K/UL    ABS. LYMPHOCYTES 2.0 0.8 - 3.5 K/UL    ABS. MONOCYTES 0.7 0.0 - 1.0 K/UL    ABS. EOSINOPHILS 0.2 0.0 - 0.4 K/UL    ABS. BASOPHILS 0.0 0.0 - 0.1 K/UL    ABS. IMM. GRANS. 0.0 0.00 - 0.04 K/UL    DF AUTOMATED     EKG, 12 LEAD, INITIAL    Collection Time: 07/03/20  7:55 PM   Result Value Ref Range    Ventricular Rate 65 BPM    Atrial Rate 65 BPM    P-R Interval 144 ms    QRS Duration 100 ms    Q-T Interval 452 ms    QTC Calculation (Bezet) 470 ms    Calculated P Axis 73 degrees    Calculated R Axis 17 degrees    Calculated T Axis 73 degrees    Diagnosis       Normal sinus rhythm  Nonspecific T wave abnormality  Prolonged QT  Abnormal ECG  When compared with ECG of 28-AUG-2019 13:49,  Nonspecific T wave abnormality no longer evident in Inferior leads         XR CHEST PORT   Final Result   IMPRESSION:   No acute process.         CT Results  (Last 48 hours)    None          Local Primary Care Physicians   [facility] [phone]  MD Radha Decker MD Viki Andrew, MD [facility] Doctors [phone]  Tuan Briseno, MD Carol Mcgowan MD Lundy East, MD [address] [phone]  [place] [address] [facility] [phone]  MD Jadon Tyson MD Loma Searles, MD Pauleen Diesel, MD   [facility] [phone]  [facility] MD Layla JONES MD Foster Laster, NP [facility] [phone]  Keely Graham, MD Liborio Villanueva, MD Lidia Mcclure, MD Sugar Eric, MD Venancio Hayden, MD Jose Palomino, MD Rishi Amaro MD   [address] [facility]  Deanna Fuentes MD [address] [facility] [facility] [address] [phone]  Noble Montanez, MD Michael Calles, NP  Drea Salcido, MD Manohar James, MD Kylee Valles, MD Jeff Gonzales, MD Chloe Davis MD   [address] [phone]  Sherry Fernandes, MD Teja García, FNP  Jeff Sarkar, NP  Bret Ventura, MD Jayla Villegas, MD Parviz Hammond, MD Sugey Smith, MD DAVIS [facility] [phone]  Isaura Sousa, MD Teresa Kearns, MD Prerna Allen, MD Clarissa Boswell, MD Abilio Campoverde MD   [address] [phone]  Santiago Braga, MD Yoel Jolly MD [facility] [phone]  Linwood Baum, MD Kevyn Zambrano, MD Jose Mcallister, MD   [facility] [phone]  MD Tyrell Toscano, MD Estephania Noel, MD Rosi Finnegan, MD Annabel Munroe, MD Piyush Ott, NP  Fab Mas, MD [facility]   [phone]  Fara Favre, MD Nova Earl, MD Vona Hack, MD     [address] [facility] [phone]  Yaz Bradley, MD Stacy Hanson, JANELLE Rivera, ZONIA Rivera, JANELLE Vasquez, MD Loretta Joseph, NP   Julissa Nguyen,    Miscellaneous:  Fernando Chu MD [facility] Departments   For adult and child immunizations, family planning, TB screening, STD testing and women's health services.    [facility]: [place] [address] [place] [address] Road [phone]   [facility]   657 [facility]   [address] [place] [address]   [address] [facility]: Maria Luzray Heron [phone]     [place] [phone] [address] [facility] [address] Clinics  For primary care services, woman and child wellness, and some clinics providing specialty care. [facility] -- [address] [facility] [address] [facility] [phone] [address] [facility] 200 [facility] 3614 [facility] [phone]   [address] [facility] [address] [phone] [address]  [employer] 160[address] [facility] 5850  Community  [phone]   [address] [facility] [address] [address] [place] [phone]   [facility] [address] [facility] [phone]   [facility] the [address] [phone]   [facility]: [address]     [place] [address] [address]50  Community  [phone]   [employer]  [address] ([email or web address])         Sexual Health/Woman Wellness Clinics   For STD/HIV testing and treatment, pregnancy testing and services, men's health, birth control services, LGBT services, and hepatitis/HPV vaccine services. [place] for [place] [employer] [address] [facility] 75 [facility] 1579 600 [facility] [phone]   [facility] [address] , 5th floor [phone]   Pregnancy [address] for Women [address]  [facility] [phone]        [place] [address] [facility] [address]   [phone]   [place]   [phone]   Women, Infant and Children's Services: [address] [phone]       [address] [facility] [address] [facility] [address] Intervention   [phone]   480 [facility]. 16.   [address] [facility]

## 2020-07-06 LAB
ATRIAL RATE: 65 BPM
CALCULATED P AXIS, ECG09: 73 DEGREES
CALCULATED R AXIS, ECG10: 17 DEGREES
CALCULATED T AXIS, ECG11: 73 DEGREES
DIAGNOSIS, 93000: NORMAL
P-R INTERVAL, ECG05: 144 MS
Q-T INTERVAL, ECG07: 452 MS
QRS DURATION, ECG06: 100 MS
QTC CALCULATION (BEZET), ECG08: 470 MS
VENTRICULAR RATE, ECG03: 65 BPM

## 2020-11-08 ENCOUNTER — HOSPITAL ENCOUNTER (INPATIENT)
Age: 35
LOS: 1 days | Discharge: HOME | DRG: 640 | End: 2020-11-10
Attending: EMERGENCY MEDICINE | Admitting: STUDENT IN AN ORGANIZED HEALTH CARE EDUCATION/TRAINING PROGRAM
Payer: MEDICARE

## 2020-11-08 DIAGNOSIS — N18.9 ACUTE RENAL FAILURE SUPERIMPOSED ON CHRONIC KIDNEY DISEASE, ON CHRONIC DIALYSIS, UNSPECIFIED ACUTE RENAL FAILURE TYPE (HCC): Primary | ICD-10-CM

## 2020-11-08 DIAGNOSIS — E87.20 METABOLIC ACIDOSIS: ICD-10-CM

## 2020-11-08 DIAGNOSIS — E83.51 HYPOCALCEMIA: ICD-10-CM

## 2020-11-08 DIAGNOSIS — N18.6 ESRD (END STAGE RENAL DISEASE) (HCC): ICD-10-CM

## 2020-11-08 DIAGNOSIS — Z99.2 ACUTE RENAL FAILURE SUPERIMPOSED ON CHRONIC KIDNEY DISEASE, ON CHRONIC DIALYSIS, UNSPECIFIED ACUTE RENAL FAILURE TYPE (HCC): Primary | ICD-10-CM

## 2020-11-08 DIAGNOSIS — N17.9 ACUTE RENAL FAILURE SUPERIMPOSED ON CHRONIC KIDNEY DISEASE, ON CHRONIC DIALYSIS, UNSPECIFIED ACUTE RENAL FAILURE TYPE (HCC): Primary | ICD-10-CM

## 2020-11-08 PROCEDURE — 96375 TX/PRO/DX INJ NEW DRUG ADDON: CPT

## 2020-11-08 PROCEDURE — 96365 THER/PROPH/DIAG IV INF INIT: CPT

## 2020-11-08 PROCEDURE — 99285 EMERGENCY DEPT VISIT HI MDM: CPT

## 2020-11-09 PROBLEM — E87.20 METABOLIC ACIDOSIS: Status: ACTIVE | Noted: 2020-11-09

## 2020-11-09 PROBLEM — E83.51 HYPOCALCEMIA: Status: ACTIVE | Noted: 2020-11-09

## 2020-11-09 LAB
ALBUMIN SERPL-MCNC: 3.5 G/DL (ref 3.5–5)
ALBUMIN/GLOB SERPL: 0.8 {RATIO} (ref 1.1–2.2)
ALP SERPL-CCNC: 77 U/L (ref 45–117)
ALT SERPL-CCNC: 17 U/L (ref 12–78)
ANION GAP BLD CALC-SCNC: 23 MMOL/L (ref 10–20)
ANION GAP BLD CALC-SCNC: 25 MMOL/L (ref 10–20)
ANION GAP SERPL CALC-SCNC: 16 MMOL/L (ref 5–15)
ANION GAP SERPL CALC-SCNC: 18 MMOL/L (ref 5–15)
ANION GAP SERPL CALC-SCNC: 18 MMOL/L (ref 5–15)
APPEARANCE UR: ABNORMAL
ARTERIAL PATENCY WRIST A: ABNORMAL
ARTERIAL PATENCY WRIST A: ABNORMAL
AST SERPL-CCNC: 31 U/L (ref 15–37)
ATRIAL RATE: 55 BPM
ATRIAL RATE: 63 BPM
BACTERIA URNS QL MICRO: NEGATIVE /HPF
BASE DEFICIT BLD-SCNC: 19 MMOL/L
BASE DEFICIT BLD-SCNC: 21 MMOL/L
BASOPHILS # BLD: 0 K/UL (ref 0–0.1)
BASOPHILS NFR BLD: 0 % (ref 0–1)
BDY SITE: ABNORMAL
BDY SITE: ABNORMAL
BILIRUB SERPL-MCNC: 0.4 MG/DL (ref 0.2–1)
BILIRUB UR QL: NEGATIVE
BUN BLD-MCNC: >120 MG/DL (ref 9–20)
BUN BLD-MCNC: >120 MG/DL (ref 9–20)
BUN SERPL-MCNC: 173 MG/DL (ref 6–20)
BUN SERPL-MCNC: 174 MG/DL (ref 6–20)
BUN SERPL-MCNC: 176 MG/DL (ref 6–20)
BUN/CREAT SERPL: 7 (ref 12–20)
BUN/CREAT SERPL: 8 (ref 12–20)
BUN/CREAT SERPL: ABNORMAL (ref 12–20)
CA-I BLD-MCNC: 0.53 MMOL/L (ref 1.12–1.32)
CA-I BLD-MCNC: 0.74 MMOL/L (ref 1.12–1.32)
CA-I BLD-SCNC: 0.56 MMOL/L (ref 1.12–1.32)
CALCIUM SERPL-MCNC: 5.1 MG/DL (ref 8.5–10.1)
CALCIUM SERPL-MCNC: 5.2 MG/DL (ref 8.5–10.1)
CALCIUM SERPL-MCNC: <5 MG/DL (ref 8.5–10.1)
CALCULATED P AXIS, ECG09: 67 DEGREES
CALCULATED P AXIS, ECG09: 76 DEGREES
CALCULATED R AXIS, ECG10: 18 DEGREES
CALCULATED R AXIS, ECG10: 7 DEGREES
CALCULATED T AXIS, ECG11: 39 DEGREES
CALCULATED T AXIS, ECG11: 59 DEGREES
CHLORIDE BLD-SCNC: 107 MMOL/L (ref 98–107)
CHLORIDE BLD-SCNC: 107 MMOL/L (ref 98–107)
CHLORIDE SERPL-SCNC: 104 MMOL/L (ref 97–108)
CHLORIDE SERPL-SCNC: 106 MMOL/L (ref 97–108)
CHLORIDE SERPL-SCNC: 108 MMOL/L (ref 97–108)
CO2 BLD-SCNC: 12 MMOL/L (ref 21–32)
CO2 BLD-SCNC: 13 MMOL/L (ref 21–32)
CO2 SERPL-SCNC: 12 MMOL/L (ref 21–32)
CO2 SERPL-SCNC: 13 MMOL/L (ref 21–32)
CO2 SERPL-SCNC: 13 MMOL/L (ref 21–32)
COLOR UR: ABNORMAL
CREAT BLD-MCNC: >15 MG/DL (ref 0.6–1.3)
CREAT BLD-MCNC: >15 MG/DL (ref 0.6–1.3)
CREAT SERPL-MCNC: 22.9 MG/DL (ref 0.7–1.3)
CREAT SERPL-MCNC: 23.3 MG/DL (ref 0.7–1.3)
CREAT SERPL-MCNC: >40 MG/DL (ref 0.7–1.3)
CREAT UR-MCNC: 65.1 MG/DL
DIAGNOSIS, 93000: NORMAL
DIAGNOSIS, 93000: NORMAL
DIFFERENTIAL METHOD BLD: ABNORMAL
EOSINOPHIL # BLD: 0.2 K/UL (ref 0–0.4)
EOSINOPHIL NFR BLD: 3 % (ref 0–7)
EPITH CASTS URNS QL MICRO: ABNORMAL /LPF
ERYTHROCYTE [DISTWIDTH] IN BLOOD BY AUTOMATED COUNT: 13.7 % (ref 11.5–14.5)
GAS FLOW.O2 O2 DELIVERY SYS: ABNORMAL L/MIN
GAS FLOW.O2 O2 DELIVERY SYS: ABNORMAL L/MIN
GLOBULIN SER CALC-MCNC: 4.5 G/DL (ref 2–4)
GLUCOSE BLD-MCNC: 118 MG/DL (ref 65–100)
GLUCOSE BLD-MCNC: 83 MG/DL (ref 65–100)
GLUCOSE SERPL-MCNC: 105 MG/DL (ref 65–100)
GLUCOSE SERPL-MCNC: 107 MG/DL (ref 65–100)
GLUCOSE SERPL-MCNC: 143 MG/DL (ref 65–100)
GLUCOSE UR STRIP.AUTO-MCNC: NEGATIVE MG/DL
HBV SURFACE AB SER QL: REACTIVE
HBV SURFACE AB SER-ACNC: >1000 MIU/ML
HBV SURFACE AG SER QL: <0.1 INDEX
HBV SURFACE AG SER QL: NEGATIVE
HCO3 BLD-SCNC: 10.4 MMOL/L (ref 22–26)
HCO3 BLD-SCNC: 9.6 MMOL/L (ref 22–26)
HCT VFR BLD AUTO: 23.3 % (ref 36.6–50.3)
HCT VFR BLD CALC: 25 % (ref 36.6–50.3)
HCT VFR BLD CALC: 29 % (ref 36.6–50.3)
HGB BLD-MCNC: 7.8 G/DL (ref 12.1–17)
HGB UR QL STRIP: ABNORMAL
IMM GRANULOCYTES # BLD AUTO: 0.1 K/UL (ref 0–0.04)
IMM GRANULOCYTES NFR BLD AUTO: 1 % (ref 0–0.5)
KETONES UR QL STRIP.AUTO: NEGATIVE MG/DL
LEUKOCYTE ESTERASE UR QL STRIP.AUTO: ABNORMAL
LYMPHOCYTES # BLD: 1.4 K/UL (ref 0.8–3.5)
LYMPHOCYTES NFR BLD: 19 % (ref 12–49)
MCH RBC QN AUTO: 30.4 PG (ref 26–34)
MCHC RBC AUTO-ENTMCNC: 33.5 G/DL (ref 30–36.5)
MCV RBC AUTO: 90.7 FL (ref 80–99)
MONOCYTES # BLD: 0.8 K/UL (ref 0–1)
MONOCYTES NFR BLD: 10 % (ref 5–13)
NEUTS SEG # BLD: 4.9 K/UL (ref 1.8–8)
NEUTS SEG NFR BLD: 67 % (ref 32–75)
NITRITE UR QL STRIP.AUTO: NEGATIVE
NRBC # BLD: 0 K/UL (ref 0–0.01)
NRBC BLD-RTO: 0 PER 100 WBC
P-R INTERVAL, ECG05: 146 MS
P-R INTERVAL, ECG05: 152 MS
PCO2 BLD: 33.6 MMHG (ref 35–45)
PCO2 BLD: 36.6 MMHG (ref 35–45)
PH BLD: 7.03 [PH] (ref 7.35–7.45)
PH BLD: 7.1 [PH] (ref 7.35–7.45)
PH UR STRIP: 6 [PH] (ref 5–8)
PLATELET # BLD AUTO: 149 K/UL (ref 150–400)
PMV BLD AUTO: 11.8 FL (ref 8.9–12.9)
PO2 BLD: 54 MMHG (ref 80–100)
PO2 BLD: 75 MMHG (ref 80–100)
POTASSIUM BLD-SCNC: 3.3 MMOL/L (ref 3.5–5.1)
POTASSIUM BLD-SCNC: 3.7 MMOL/L (ref 3.5–5.1)
POTASSIUM SERPL-SCNC: 3.4 MMOL/L (ref 3.5–5.1)
POTASSIUM SERPL-SCNC: 3.6 MMOL/L (ref 3.5–5.1)
POTASSIUM SERPL-SCNC: 3.7 MMOL/L (ref 3.5–5.1)
PROT SERPL-MCNC: 8 G/DL (ref 6.4–8.2)
PROT UR STRIP-MCNC: 100 MG/DL
PROT UR-MCNC: 97 MG/DL (ref 0–11.9)
PROT/CREAT UR-RTO: 1.5
Q-T INTERVAL, ECG07: 508 MS
Q-T INTERVAL, ECG07: 528 MS
QRS DURATION, ECG06: 104 MS
QRS DURATION, ECG06: 96 MS
QTC CALCULATION (BEZET), ECG08: 505 MS
QTC CALCULATION (BEZET), ECG08: 519 MS
RBC # BLD AUTO: 2.57 M/UL (ref 4.1–5.7)
RBC #/AREA URNS HPF: ABNORMAL /HPF (ref 0–5)
SAO2 % BLD: 76 % (ref 92–97)
SAO2 % BLD: 86 % (ref 92–97)
SERVICE CMNT-IMP: ABNORMAL
SERVICE CMNT-IMP: ABNORMAL
SODIUM BLD-SCNC: 139 MMOL/L (ref 136–145)
SODIUM BLD-SCNC: 140 MMOL/L (ref 136–145)
SODIUM SERPL-SCNC: 135 MMOL/L (ref 136–145)
SODIUM SERPL-SCNC: 136 MMOL/L (ref 136–145)
SODIUM SERPL-SCNC: 137 MMOL/L (ref 136–145)
SODIUM UR-SCNC: 74 MMOL/L
SP GR UR REFRACTOMETRY: 1.01 (ref 1–1.03)
SPECIMEN TYPE: ABNORMAL
SPECIMEN TYPE: ABNORMAL
TOTAL RESP. RATE, ITRR: 15
UA: UC IF INDICATED,UAUC: ABNORMAL
UROBILINOGEN UR QL STRIP.AUTO: 0.2 EU/DL (ref 0.2–1)
VENTRICULAR RATE, ECG03: 55 BPM
VENTRICULAR RATE, ECG03: 63 BPM
WBC # BLD AUTO: 7.4 K/UL (ref 4.1–11.1)
WBC URNS QL MICRO: >100 /HPF (ref 0–4)

## 2020-11-09 PROCEDURE — 74011250636 HC RX REV CODE- 250/636: Performed by: NURSE PRACTITIONER

## 2020-11-09 PROCEDURE — 36415 COLL VENOUS BLD VENIPUNCTURE: CPT

## 2020-11-09 PROCEDURE — 74011250636 HC RX REV CODE- 250/636: Performed by: INTERNAL MEDICINE

## 2020-11-09 PROCEDURE — 86706 HEP B SURFACE ANTIBODY: CPT

## 2020-11-09 PROCEDURE — 74011250637 HC RX REV CODE- 250/637: Performed by: EMERGENCY MEDICINE

## 2020-11-09 PROCEDURE — 82803 BLOOD GASES ANY COMBINATION: CPT

## 2020-11-09 PROCEDURE — 93005 ELECTROCARDIOGRAM TRACING: CPT

## 2020-11-09 PROCEDURE — 74011000250 HC RX REV CODE- 250: Performed by: EMERGENCY MEDICINE

## 2020-11-09 PROCEDURE — 80047 BASIC METABLC PNL IONIZED CA: CPT

## 2020-11-09 PROCEDURE — 74011250636 HC RX REV CODE- 250/636: Performed by: EMERGENCY MEDICINE

## 2020-11-09 PROCEDURE — 87340 HEPATITIS B SURFACE AG IA: CPT

## 2020-11-09 PROCEDURE — 81001 URINALYSIS AUTO W/SCOPE: CPT

## 2020-11-09 PROCEDURE — 80053 COMPREHEN METABOLIC PANEL: CPT

## 2020-11-09 PROCEDURE — 84156 ASSAY OF PROTEIN URINE: CPT

## 2020-11-09 PROCEDURE — 87086 URINE CULTURE/COLONY COUNT: CPT

## 2020-11-09 PROCEDURE — 74011250637 HC RX REV CODE- 250/637: Performed by: NURSE PRACTITIONER

## 2020-11-09 PROCEDURE — 80048 BASIC METABOLIC PNL TOTAL CA: CPT

## 2020-11-09 PROCEDURE — 84300 ASSAY OF URINE SODIUM: CPT

## 2020-11-09 PROCEDURE — 74011000258 HC RX REV CODE- 258: Performed by: EMERGENCY MEDICINE

## 2020-11-09 PROCEDURE — 85025 COMPLETE CBC W/AUTO DIFF WBC: CPT

## 2020-11-09 PROCEDURE — 65660000000 HC RM CCU STEPDOWN

## 2020-11-09 RX ORDER — SODIUM BICARBONATE IN D5W 150/1000ML
PLASTIC BAG, INJECTION (ML) INTRAVENOUS CONTINUOUS
Status: DISPENSED | OUTPATIENT
Start: 2020-11-09 | End: 2020-11-09

## 2020-11-09 RX ORDER — SEVELAMER CARBONATE 800 MG/1
800 TABLET, FILM COATED ORAL
Status: DISCONTINUED | OUTPATIENT
Start: 2020-11-09 | End: 2020-11-09

## 2020-11-09 RX ORDER — CALCIUM GLUCONATE 94 MG/ML
1 INJECTION, SOLUTION INTRAVENOUS
Status: COMPLETED | OUTPATIENT
Start: 2020-11-09 | End: 2020-11-09

## 2020-11-09 RX ORDER — LORAZEPAM 2 MG/ML
1 INJECTION INTRAMUSCULAR ONCE
Status: COMPLETED | OUTPATIENT
Start: 2020-11-09 | End: 2020-11-09

## 2020-11-09 RX ORDER — LORAZEPAM 0.5 MG/1
0.5 TABLET ORAL ONCE
Status: COMPLETED | OUTPATIENT
Start: 2020-11-09 | End: 2020-11-10

## 2020-11-09 RX ORDER — CALCITRIOL 0.25 UG/1
0.25 CAPSULE ORAL DAILY
Status: DISCONTINUED | OUTPATIENT
Start: 2020-11-09 | End: 2020-11-09

## 2020-11-09 RX ORDER — GABAPENTIN 300 MG/1
300 CAPSULE ORAL
Status: COMPLETED | OUTPATIENT
Start: 2020-11-09 | End: 2020-11-09

## 2020-11-09 RX ORDER — HEPARIN SODIUM 5000 [USP'U]/ML
5000 INJECTION, SOLUTION INTRAVENOUS; SUBCUTANEOUS EVERY 12 HOURS
Status: DISCONTINUED | OUTPATIENT
Start: 2020-11-09 | End: 2020-11-10 | Stop reason: HOSPADM

## 2020-11-09 RX ORDER — MORPHINE SULFATE 4 MG/ML
4 INJECTION INTRAVENOUS ONCE
Status: COMPLETED | OUTPATIENT
Start: 2020-11-09 | End: 2020-11-09

## 2020-11-09 RX ORDER — CYANOCOBALAMIN (VITAMIN B-12) 500 MCG
400 TABLET ORAL DAILY
Status: DISCONTINUED | OUTPATIENT
Start: 2020-11-09 | End: 2020-11-09

## 2020-11-09 RX ORDER — MORPHINE SULFATE 4 MG/ML
INJECTION INTRAVENOUS
Status: DISPENSED
Start: 2020-11-09 | End: 2020-11-09

## 2020-11-09 RX ADMIN — LORAZEPAM 1 MG: 2 INJECTION INTRAMUSCULAR; INTRAVENOUS at 07:03

## 2020-11-09 RX ADMIN — SODIUM BICARBONATE 150 MEQ/1,000 ML IN DEXTROSE 5 % INTRAVENOUS: SOLUTION at 06:40

## 2020-11-09 RX ADMIN — NEPHROCAP 1 CAPSULE: 1 CAP ORAL at 09:09

## 2020-11-09 RX ADMIN — CALCITRIOL 0.25 MCG: 0.25 CAPSULE ORAL at 09:09

## 2020-11-09 RX ADMIN — HEPARIN SODIUM 5000 UNITS: 5000 INJECTION INTRAVENOUS; SUBCUTANEOUS at 09:09

## 2020-11-09 RX ADMIN — CHOLECALCIFEROL TAB 10 MCG (400 UNIT) 1 TABLET: 10 TAB at 09:09

## 2020-11-09 RX ADMIN — CALCIUM GLUCONATE 1 G: 98 INJECTION, SOLUTION INTRAVENOUS at 06:35

## 2020-11-09 RX ADMIN — MORPHINE SULFATE 4 MG: 4 INJECTION INTRAVENOUS at 04:52

## 2020-11-09 RX ADMIN — CALCIUM GLUCONATE 2 G: 98 INJECTION, SOLUTION INTRAVENOUS at 04:51

## 2020-11-09 RX ADMIN — GABAPENTIN 300 MG: 300 CAPSULE ORAL at 05:53

## 2020-11-09 RX ADMIN — CALCIUM GLUCONATE 1 G: 98 INJECTION, SOLUTION INTRAVENOUS at 05:55

## 2020-11-09 RX ADMIN — SEVELAMER CARBONATE 800 MG: 800 TABLET, FILM COATED ORAL at 09:09

## 2020-11-09 NOTE — H&P
Hospitalist Admission Note    NAME: Philip Andersen   :  1985   MRN:  [id number]     Date/Time:  2020 8:05 AM    Patient PCP: Josias, MD Lui  ______________________________________________________________________  Given the patient's current clinical presentation, I have a high level of concern for decompensation if discharged from the emergency department. Complex decision making was performed, which includes reviewing the patient's available past medical records, laboratory results, and x-ray films. My assessment of this patient's clinical condition and my plan of care is as follows. Assessment / Plan:  ESRD with missed HD x 5 months  - Continues to make urine, reports that he has urinary frequency of small amounts. Severe hypocalcemia  Anion gap acidosis  - Nephrology consult, to be seen by Dr. Alegre Kaylene - plan of HD.  - Patient Has received 4 gm calcium gluconate thus far. - Patient was placed on NaHCO3 gtt in ED, will continue. - Monitor lytes. - Check ionized calcium. Prolonged QTc  - QTc on last EKG was down to 505. - Admit to telemetry. - Daily EKG. - Replete lytes prn. Hypoxemia  - Supplemental O2 prn. Anemia of chronic disease   Thrombocytopenia  - No tx indicated at this time. - Transfuse for Hgb < 7.0  - Monitor. Hyperglycemia   - No tx indicated at this time. - Monitor with a.m. labs. Crohn's disease without acute flare.  - No tx indicated at this time. H/O DVTs    - Patient reports that this was in the remote past and that he has not been on anticoagulation.  - No sign of DVT. - VTE prophylaxis. Code Status:  Full  Surrogate Decision Maker:  Patient does not wish to name surrogate medical decision maker. DVT Prophylaxis:  Heparin  GI Prophylaxis:  Not indicated    Baseline: Independent       Subjective:   CHIEF COMPLAINT:  Generalized malaise, paraesthesias,  and abnormal muscle movements.     HISTORY OF PRESENT ILLNESS: Tori Mtz is a 30 y.o.  male with PMH significant for congenital hydroureteronephrosis, ESRD, Crohn's disease, and remote DVT who presented to the ED with complaints of generalized malaise, paresthesia (feeling as if needles were pricking him all over his body), and muscle twitching. Additionally, the patient reports that he has not gone to dialysis in approximately 5 months. Of note, he does continue to make urine and complains of urinary frequency of small amounts of urine. An EKG was completed in the ED which revealed broad QTC. Labs revealed anion gap acidosis, mild hyperglycemia, and severe hypocalcemia. Interestingly, the patient's serum potassium was WNL. In the ED the patient received a total of 4 g of calcium gluconate IV and was started on a bicarb drip. Nephrology consult was requested. Repeat EKG did reveal improvement in QTC (down to 505). We were asked to admit for work up and evaluation of the above problems.      Past Medical History:   Diagnosis Date    Acute renal failure (ARF) ([facility] [address] 7/30/2015    Chronic kidney disease     pt on hemodialysis d/t reflux    Congenital hydroureteronephrosis     Crohn disease ([facility]     DVT (deep venous thrombosis) (HCC)          ESRD (end stage renal disease) ([facility] [address]     on HD [id number]    Gastrointestinal disorder     Chron's    VUR (vesicoureteric reflux)         Past Surgical History:   Procedure Laterality Date    HX OTHER SURGICAL  Pt has a filter for DVT's    HX OTHER SURGICAL      Marquis placed 3 weeks ago    HX VASCULAR ACCESS  1/14/14    CREATION LEFT UPPER ARM ARTERIO VENOUS GRAFT   (7mm PTFE)       Social History     Tobacco Use    Smoking status: Former Smoker    Smokeless tobacco: Never Used    Tobacco comment: quit about a year ago    Substance Use Topics    Alcohol use: No        Family History   Problem Relation Age of Onset    Heart Disease Other     Kidney Disease Other         kidney stones    Diabetes Mother     Hypertension Mother       Allergies   Allergen Reactions    Codeine Nausea and Vomiting    Iodinated Contrast Media Itching    Shellfish Derived Hives     And shrimp        Prior to Admission medications    Medication Sig Start Date End Date Taking? Authorizing Provider   calcitRIOL (ROCALTROL) 0.25 mcg capsule Take 1 Cap by mouth daily. 7/3/20   Steffanie Richardson MD   cholecalciferol (Vitamin D3) 10 mcg (400 unit) chew chewable tablet Take 1 Tab by mouth daily. 7/3/20   Steffanie Richardson MD   cyclobenzaprine (FLEXERIL) 10 mg tablet Take 1 Tab by mouth three (3) times daily as needed for Muscle Spasm(s). 12/26/19   Bautista Mckee MD   lidocaine (LIDODERM) 5 % Apply patch to the affected area for 12 hours a day and remove for 12 hours a day. 12/26/19   Bautista Mckee MD   ondansetron (ZOFRAN ODT) 4 mg disintegrating tablet Take 1 Tab by mouth every eight (8) hours as needed for Nausea. 12/26/19   Bautista Mckee MD   ergocalciferol (ERGOCALCIFEROL) 50,000 unit capsule Take 50,000 Units by mouth every Monday. Provider, Historical   b complex-vitamin c-folic acid (NEPHROCAPS) 1 mg capsule Take 1 Cap by mouth daily. 9/6/19   Toya Ly MD   epoetin tin-epbx (RETACRIT) 4,000 unit/mL injection 1 mL by SubCUTAneous route DIALYSIS MON, WED & FRI. Indications: anemia due to kidney failure, method of removing waste/poison from blood with dialysis 9/6/19   Toya Ly MD   iron smglim-N-R39-Ca-suc-stoma (CHROMAGEN) tablet Take 1 Tab by mouth daily. 9/6/19   Toya Ly MD   sevelamer carbonate (RENVELA) 800 mg tab tab Take 1 Tab by mouth three (3) times daily (with meals). 9/6/19   Toya Ly MD       REVIEW OF SYSTEMS:     I am not able to complete the review of systems because:    The patient is intubated and sedated    The patient has altered mental status due to his acute medical problems    The patient has baseline aphasia from prior stroke(s)    The patient has baseline dementia and is not reliable historian    The patient is in acute medical distress and unable to provide information           Total of 12 systems reviewed as follows:       POSITIVE = bolded text  Negative = un-bolded text  General:  Fever, chills, sweats, generalized weakness, weight loss/gain,      loss of appetite, malaise   Eyes:    Blurred vision, eye pain, loss of vision, double vision  ENT:    Rhinorrhea, pharyngitis   Respiratory:   Cough, sputum production, SOB, NGUYEN, wheezing, pleuritic pain   Cardiology:   Chest pain, palpitations, orthopnea, PND, edema, syncope   Gastrointestinal:  Abdominal pain , N/V, diarrhea, dysphagia, constipation, bleeding   Genitourinary:  Frequency, urgency, dysuria, hematuria, incontinence   Muskuloskeletal :  Arthralgia, myalgia, back pain  Hematology:  Easy bruising, nose or gum bleeding, lymphadenopathy   Dermatological: Rash, ulceration, pruritis, color change / jaundice  Endocrine:   Hot flashes or polydipsia   Neurological:  Headache, dizziness, confusion, focal weakness, paresthesia,     speech difficulties, memory loss, gait difficulty  Psychological: Feelings of anxiety, depression, agitation    Objective:   VITALS:    Visit Vitals  /73 (BP 1 Location: Left arm, BP Patient Position: At rest)   Pulse 60   Temp 98.6 °F (37 °C)   Resp 18   Ht 5' 9\" (1.753 m)   Wt 68.2 kg (150 lb 5.7 oz)   SpO2 100%   BMI 22.20 kg/m²       PHYSICAL EXAM:    General:    Somnolent but arousable to verbal stimuli. Oriented x 3  Cooperative. NAD. HEENT: Atraumatic. Anicteric sclerae. Pink conjunctivae. Mucosa moist.  Throat    clear. Neck:  Supple. Symmetrical.  Thyroid:  Non tender. Lungs:   Breath sounds coarse. No wheezing/rhonchi/rales. Chest wall:  No tenderness. No Accessory muscle use. Heart:   RRR, SR with prolonged QT. No  Murmur. No edema. Abdomen:   Soft/ND/NT.   Bowel sounds normal.  Extremities: No clubbing or cyanosis. Skin turgor sluggish. Capillary refill < 3 sec. Radial pulses 2+. Skin:     No pallor or jaundice. No rashes. Psych:  Not depressed. No anxiety or agitation. Neurologic: Patient had received lorazepam prior to exam and was drowsy. Falls to    sleep when not stimulated. Generalized paraesthesias, \"feels like pins are    pricking me all over\". Occasional jerking muscle movements. EOMs    intact. No facial asymmetry. No aphasia or slurred speech. Symmetrical    Strength. Sensation grossly intact.    _______________________________________________________________________  Care Plan discussed with:    Comments   Patient Y    Family      RN Y    Care Manager                    Consultant:      _______________________________________________________________________  Expected  Disposition:   Home with Family Y   HH/PT/OT/RN    SNF/LTC    TAMIKO    ________________________________________________________________________  Signed: Jorge Mike NP    Procedures: see electronic medical records for all procedures/Xrays and details which were not copied into this note but were reviewed prior to creation of Plan. LAB DATA REVIEWED:    Recent Results (from the past 24 hour(s))   CBC WITH AUTOMATED DIFF    Collection Time: 11/09/20  2:03 AM   Result Value Ref Range    WBC 7.4 4.1 - 11.1 K/uL    RBC 2.57 (L) 4.10 - 5.70 M/uL    HGB 7.8 (L) 12.1 - 17.0 g/dL    HCT 23.3 (L) 36.6 - 50.3 %    MCV 90.7 80.0 - 99.0 FL    MCH 30.4 26.0 - 34.0 PG    MCHC 33.5 30.0 - 36.5 g/dL    RDW 13.7 11.5 - 14.5 %    PLATELET 475 (L) 547 - 400 K/uL    MPV 11.8 8.9 - 12.9 FL    NRBC 0.0 0  WBC    ABSOLUTE NRBC 0.00 0.00 - 0.01 K/uL    NEUTROPHILS 67 32 - 75 %    LYMPHOCYTES 19 12 - 49 %    MONOCYTES 10 5 - 13 %    EOSINOPHILS 3 0 - 7 %    BASOPHILS 0 0 - 1 %    IMMATURE GRANULOCYTES 1 (H) 0.0 - 0.5 %    ABS. NEUTROPHILS 4.9 1.8 - 8.0 K/UL    ABS. LYMPHOCYTES 1.4 0.8 - 3.5 K/UL    ABS.  MONOCYTES 0.8 0.0 - 1.0 K/UL    ABS. EOSINOPHILS 0.2 0.0 - 0.4 K/UL    ABS. BASOPHILS 0.0 0.0 - 0.1 K/UL    ABS. IMM. GRANS. 0.1 (H) 0.00 - 0.04 K/UL    DF AUTOMATED     METABOLIC PANEL, COMPREHENSIVE    Collection Time: 11/09/20  2:03 AM   Result Value Ref Range    Sodium 137 136 - 145 mmol/L    Potassium 3.6 3.5 - 5.1 mmol/L    Chloride 108 97 - 108 mmol/L    CO2 13 (LL) 21 - 32 mmol/L    Anion gap 16 (H) 5 - 15 mmol/L    Glucose 105 (H) 65 - 100 mg/dL     (H) 6 - 20 MG/DL    Creatinine 23.30 (H) 0.70 - 1.30 MG/DL    BUN/Creatinine ratio 7 (L) 12 - 20      GFR est AA 3 (L) >60 ml/min/1.73m2    GFR est non-AA 2 (L) >60 ml/min/1.73m2    Calcium <5.0 (LL) 8.5 - 10.1 MG/DL    Bilirubin, total 0.4 0.2 - 1.0 MG/DL    ALT (SGPT) 17 12 - 78 U/L    AST (SGOT) 31 15 - 37 U/L    Alk.  phosphatase 77 45 - 117 U/L    Protein, total 8.0 6.4 - 8.2 g/dL    Albumin 3.5 3.5 - 5.0 g/dL    Globulin 4.5 (H) 2.0 - 4.0 g/dL    A-G Ratio 0.8 (L) 1.1 - 2.2     URINALYSIS W/ REFLEX CULTURE    Collection Time: 11/09/20  3:23 AM    Specimen: Urine   Result Value Ref Range    Color YELLOW/STRAW      Appearance TURBID (A) CLEAR      Specific gravity 1.009 1.003 - 1.030      pH (UA) 6.0 5.0 - 8.0      Protein 100 (A) NEG mg/dL    Glucose Negative NEG mg/dL    Ketone Negative NEG mg/dL    Bilirubin Negative NEG      Blood MODERATE (A) NEG      Urobilinogen 0.2 0.2 - 1.0 EU/dL    Nitrites Negative NEG      Leukocyte Esterase LARGE (A) NEG      WBC >100 (H) 0 - 4 /hpf    RBC 10-20 0 - 5 /hpf    Epithelial cells FEW FEW /lpf    Bacteria Negative NEG /hpf    UA:UC IF INDICATED URINE CULTURE ORDERED (A) CNI     EKG, 12 LEAD, INITIAL    Collection Time: 11/09/20  3:31 AM   Result Value Ref Range    Ventricular Rate 63 BPM    Atrial Rate 63 BPM    P-R Interval 152 ms    QRS Duration 96 ms    Q-T Interval 508 ms    QTC Calculation (Bezet) 519 ms    Calculated P Axis 67 degrees    Calculated R Axis 7 degrees    Calculated T Axis 59 degrees    Diagnosis Normal sinus rhythm  Moderate voltage criteria for LVH, may be normal variant  Nonspecific T wave abnormality  Prolonged QT  When compared with ECG of 03-JUL-2020 19:55,  T wave amplitude has decreased in Inferior leads  QT has lengthened     POC CHEM8    Collection Time: 11/09/20  4:41 AM   Result Value Ref Range    Calcium, ionized (POC) 0.53 (LL) 1.12 - 1.32 mmol/L    Sodium (POC) 140 136 - 145 mmol/L    Potassium (POC) 3.7 3.5 - 5.1 mmol/L    Chloride (POC) 107 98 - 107 mmol/L    CO2 (POC) 12 (LL) 21 - 32 mmol/L    Anion gap (POC) 25 (H) 10 - 20 mmol/L    Glucose (POC) 83 65 - 100 mg/dL    BUN (POC) >120 (H) 9 - 20 mg/dL    Creatinine (POC) >15.0 (H) 0.6 - 1.3 mg/dL    GFRAA, POC Cannot be calculated >60 ml/min/1.73m2    GFRNA, POC Cannot be calculated >60 ml/min/1.73m2    Hematocrit (POC) 29 (L) 36.6 - 50.3 %    Comment Notified RN or MD immediately by      PROTEIN/CREATININE RATIO, URINE    Collection Time: 11/09/20  4:47 AM   Result Value Ref Range    Protein, urine random 97 (H) 0.0 - 11.9 mg/dL    Creatinine, urine 65.10 mg/dL    Protein/Creat. urine Ratio 1.5     POC EG7    Collection Time: 11/09/20  5:28 AM   Result Value Ref Range    Calcium, ionized (POC) 0.56 (LL) 1.12 - 1.32 mmol/L    pH (POC) 7.10 (LL) 7.35 - 7.45      pCO2 (POC) 33.6 (L) 35.0 - 45.0 MMHG    pO2 (POC) 54 (L) 80 - 100 MMHG    HCO3 (POC) 10.4 (L) 22 - 26 MMOL/L    Base deficit (POC) 19 mmol/L    sO2 (POC) 76 (L) 92 - 97 %    Site OTHER      Device: ROOM AIR      Allens test (POC) N/A      Specimen type (POC) VENOUS BLOOD      Total resp.  rate 15     EKG, 12 LEAD, SUBSEQUENT    Collection Time: 11/09/20  6:44 AM   Result Value Ref Range    Ventricular Rate 55 BPM    Atrial Rate 55 BPM    P-R Interval 146 ms    QRS Duration 104 ms    Q-T Interval 528 ms    QTC Calculation (Bezet) 505 ms    Calculated P Axis 76 degrees    Calculated R Axis 18 degrees    Calculated T Axis 39 degrees    Diagnosis       Sinus bradycardia  Voltage criteria for left ventricular hypertrophy  Prolonged QT  When compared with ECG of 09-NOV-2020 03:31,  MANUAL COMPARISON REQUIRED, DATA IS UNCONFIRMED     POC CHEM8    Collection Time: 11/09/20  6:52 AM   Result Value Ref Range    Calcium, ionized (POC) 0.74 (LL) 1.12 - 1.32 mmol/L    Sodium (POC) 139 136 - 145 mmol/L    Potassium (POC) 3.3 (L) 3.5 - 5.1 mmol/L    Chloride (POC) 107 98 - 107 mmol/L    CO2 (POC) 13 (LL) 21 - 32 mmol/L    Anion gap (POC) 23 (H) 10 - 20 mmol/L    Glucose (POC) 118 (H) 65 - 100 mg/dL    BUN (POC) >120 (H) 9 - 20 mg/dL    Creatinine (POC) >15.0 (H) 0.6 - 1.3 mg/dL    GFRAA, POC Cannot be calculated >60 ml/min/1.73m2    GFRNA, POC Cannot be calculated >60 ml/min/1.73m2    Hematocrit (POC) 25 (L) 36.6 - 50.3 %    Comment Comment Not Indicated.

## 2020-11-09 NOTE — ED NOTES
1 - called pharmacy regarding multiple doses of calcium - pharmacy recommends to administer each calcium order separate    0725 - Bedside shift change report given to Fern Morejon (oncoming nurse) by Prince Bucio (offgoing nurse). Report included the following information SBAR, Kardex, ED Summary, Intake/Output and MAR.

## 2020-11-09 NOTE — ED NOTES
2339: Assumed care of patient from triage. Patient ambulatory to room. Placed on the monitor x 2. SR x 2. Call bell placed in reach. Patient reports he is a dialysis patient but has not been back in \"months\". 0015: Unable to obtain blood specimen. EDT to look for IV access. 0125: Patient resting in bed at this time. Denies further needs. Call bell in reach. 0200: EDT at bedside. Lab work obtained but unable to obtain IV access. Will follow up if IV is needed. 0335: Bedside shift change report given to Manolo Goldman RN (oncoming nurse) by Margarito Michele RN (offgoing nurse). Report included the following information SBAR, Kardex, ED Summary, Intake/Output, MAR and Recent Results.

## 2020-11-09 NOTE — PROGRESS NOTES
Problem: Falls - Risk of  Goal: *Absence of Falls  Description: Document Fabián Ohara Fall Risk and appropriate interventions in the flowsheet.   Outcome: Progressing Towards Goal  Note: Fall Risk Interventions:            Medication Interventions: Patient to call before getting OOB, Teach patient to arise slowly                   Problem: Patient Education: Go to Patient Education Activity  Goal: Patient/Family Education  Outcome: Progressing Towards Goal     Problem: Pain  Goal: *Control of Pain  Outcome: Progressing Towards Goal  Goal: *PALLIATIVE CARE:  Alleviation of Pain  Outcome: Progressing Towards Goal     Problem: Patient Education: Go to Patient Education Activity  Goal: Patient/Family Education  Outcome: Progressing Towards Goal     Problem: General Infection Care Plan (Adult and Pediatric)  Goal: Improvement in signs and symptoms of infection  Outcome: Progressing Towards Goal  Goal: *Optimize nutritional status  Outcome: Progressing Towards Goal     Problem: Patient Education: Go to Patient Education Activity  Goal: Patient/Family Education  Outcome: Progressing Towards Goal

## 2020-11-09 NOTE — PROGRESS NOTES
General Surgery End of Shift Nursing Note    Bedside shift change report given to Lamont Walter RN (oncoming nurse) by Nanda Mallory (offgoing nurse). Report included the following information SBAR, Kardex, Intake/Output, MAR and Recent Results. Shift worked:   [id number]   Summary of shift:    Pt arrived to floor about 1530. Pt oriented to room, ambulated to RR, up ad kimani. Dual skin check complete, no abnormalities. Pt has limb alert to the RUE, fistula to that arm. Old scar to LUE where fistula used to be. No complaints from pt,  Started on tele, consistently run NSR this afternoon. Was supposed to have dialysis today but was never retrieved. Issues for physician to address:  Dialysis tomorrow? Number times ambulated in hallway past shift: 0    Number of times OOB to chair past shift: 0    Pain Management:  Current medication: see MAR  Patient states pain is manageable on current pain medication: YES    GI:    Current diet:  DIET REGULAR    Tolerating current diet: YES  Passing flatus: YES  Last Bowel Movement: yesterday    Respiratory:    Incentive Spirometer at bedside: NO  Patient instructed on use: NO    Patient Safety:    Falls Score: 1  Bed Alarm On? No  Sitter?  No    Joni Tinoco RN

## 2020-11-09 NOTE — PROGRESS NOTES
Pharmacy Medication Reconciliation     The patient was interviewed regarding current PTA medication list, use and drug allergies;  patient was present in room and obtained permission from patient to discuss drug regimen with visitor(s) present. The patient was questioned regarding use of any other inhalers, topical products, over the counter medications, herbal medications, vitamin products or ophthalmic/nasal/otic medication use. Allergy Update: Codeine; Iodinated contrast media; and Shellfish derived    Recommendations/Findings: The following amendments were made to the patient's active medication list on file at [facility]:   1) Additions:   +orajel prn    2) Deletions:   -b complex (nephrocap)  -calcitriol 0.25  -cholecalciferol 400 unit  -cyclobenzaprine 10mg tid prn  -epoetin  -ergocaciferol 50, 000 units Monday  -chromagen tabl  -lidocaine patch  -ondansetron  -sevelamer 800 tid w/ meals    3) Changes: none    Pertinent Findings: none    -Clarified PTA med list with patient interview, rx query. PTA medication list was corrected to the following:     Prior to Admission Medications   Prescriptions Last Dose Informant Taking?   benzocaine-menthol-zinc chloride (ORAJEL) 20-0.1-0.15 % gel mucosal gel 2020 at Unknown time Self Yes   Si Each by Mucous Membrane route as needed (tooth pain).       Facility-Administered Medications: None          Thank you,  OTIS Duval

## 2020-11-09 NOTE — PROGRESS NOTES
Nephrology Progress Note  Jewel Marques     [email or web address]  Phone - [phone]   Patient: Janie Dwyer    YOB: 1985     Admit Date: 11/8/2020   Date- 11/9/2020     CC: Follow up for end-stage renal disease        IMPRESSION & PLAN:    ESRD-he does not have any dialysis need as outpatient   History of noncompliance.  Metabolic acidosis   History of hypertension   Anemia of CKD   Secondary hyperparathyroidism  PLAN-   Hemodialysis today   Epogen today   Venofer IV today   Stop IV bicarb in the afternoon     Subjective: Interval History:   -   Mr. Rustam Lara has past medical history of end-stage renal disease. He presented to ER with weakness. He has not been dialyzed for many weeks  He does not have any dialysis unit  He has been fired by multiple [facility] unit  He has history of noncompliance  He has right arm AV graft for dialysis  Complaint of weakness   c/o sob,    No c/o chest pain,   No c/o nausea or vomiting, No c/o  fever. ROS:- As documented above  Objective:   Vitals:    11/09/20 0615 11/09/20 0642 11/09/20 0645 11/09/20 0651   BP: 116/80 113/77 110/73 110/73   Pulse: (!) 50 81 64 60   Resp: 12 19 19 18   Temp:    98.6 °F (37 °C)   SpO2: 100% 100% 100% 100%   Weight:       Height:          11/08 0701 - 11/09 0700  In: 100 [I.V.:100]  Out: -   Last 3 Recorded Weights in this Encounter    11/08/20 2255   Weight: 68.2 kg (150 lb 5.7 oz)      Physical exam:   GEN: NAD  NECK- Supple, no mass  RESP: No wheezing, Clear b/l  CVS: S1,S2  RRR  NEURO: Normal speech, Non focal  Abdo- soft, NT  EXT: No Edema   PSYCH: Normal Mood  Right arm AV graft  Chart reviewed. Pertinent Notes reviewed.      Data Review :  Recent Labs     11/09/20 0848 11/09/20 0203    137   K 3.4* 3.6    108   CO2 12* 13*   * 173*   CREA 22.90* 23.30*   * 105*   CA 5.2* <5.0*     Recent Labs     11/09/20 0203   WBC 7.4   HGB 7.8*   HCT 23.3*   PLT 149*     No results for input(s): FE, TIBC, PSAT, FERR in the last 72 hours. Medication list  reviewed  Current Facility-Administered Medications   Medication Dose Route Frequency    morphine 4 mg/mL injection        sodium bicarbonate 150 mEq/1000 mL D5W (premix)   IntraVENous CONTINUOUS    B complex-vitaminC-folic acid (NEPHROCAP) cap  1 Cap Oral DAILY    calcitRIOL (ROCALTROL) capsule 0.25 mcg  0.25 mcg Oral DAILY    cholecalciferol (VITAMIN D3) (400 Units /10 mcg) tablet 1 Tab  400 Units Oral DAILY    sevelamer carbonate (RENVELA) tab 800 mg  800 mg Oral TID WITH MEALS    heparin (porcine) injection 5,000 Units  5,000 Units SubCUTAneous Q12H     Current Outpatient Medications   Medication Sig    calcitRIOL (ROCALTROL) 0.25 mcg capsule Take 1 Cap by mouth daily.  cholecalciferol (Vitamin D3) 10 mcg (400 unit) chew chewable tablet Take 1 Tab by mouth daily.  cyclobenzaprine (FLEXERIL) 10 mg tablet Take 1 Tab by mouth three (3) times daily as needed for Muscle Spasm(s).  lidocaine (LIDODERM) 5 % Apply patch to the affected area for 12 hours a day and remove for 12 hours a day.  ondansetron (ZOFRAN ODT) 4 mg disintegrating tablet Take 1 Tab by mouth every eight (8) hours as needed for Nausea.  ergocalciferol (ERGOCALCIFEROL) 50,000 unit capsule Take 50,000 Units by mouth every Monday.  b complex-vitamin c-folic acid (NEPHROCAPS) 1 mg capsule Take 1 Cap by mouth daily.  epoetin tin-epbx (RETACRIT) 4,000 unit/mL injection 1 mL by SubCUTAneous route DIALYSIS MON, WED & FRI. Indications: anemia due to kidney failure, method of removing waste/poison from blood with dialysis    iron woizeq-S-F24-Ca-suc-stoma (CHROMAGEN) tablet Take 1 Tab by mouth daily.  sevelamer carbonate (RENVELA) 800 mg tab tab Take 1 Tab by mouth three (3) times daily (with meals).           Bert Pickens MD              [facility]  [facility] / [facility] [address], [address], [address]  Phone - [phone]               Fax - [phone]

## 2020-11-09 NOTE — CONSULTS
Jewel Marques         NAME:Josh Aguilar  VNA:[id number]   :1985     Patient seen  Non compliant to hd  No out pt hd   Hasn't received hd for many weeks    esrd  Acidosis    Plan   Hd today  Stop bicarb gtt this afternoon                        Tiara Singh MD  [facility]  [facility]  [address], [address]  [place], [address] [facility]  Phone - [phone]         Fax - [phone] [facility] Office  [address] [place] [address]  Phone - [phone]        Fax - [phone]     www. [facility]

## 2020-11-09 NOTE — ED PROVIDER NOTES
EMERGENCY DEPARTMENT HISTORY AND PHYSICAL EXAM    Please note that this dictation was completed with [employer], the computer voice recognition software. Quite often unanticipated grammatical, syntax, homophones, and other interpretive errors are inadvertently transcribed by the computer software. Please disregard these errors. Please excuse any errors that have escaped final proofreading. Date: 11/8/2020  Patient Name: Bishnu Ray  Patient Age and Sex: 30 y.o. male    History of Presenting Illness     Chief Complaint   Patient presents with    Generalized Body Aches     reports body aches and prickling sensation x1 week. notes symptoms are worse at night. History Provided By: Patient    HPI: Bishnu Ray, is a 30 y.o. 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 He has been on HD since 2005 and still makes urine. He has not been dialyzed in 4-5 months due to difficulty with accessing his dialysis center and due to additional psychosocial stressors he has experienced in his life recently. He denies any sob, LE edema or wt gain. Denies episodes of confusion. Also denies any recent illness, fever, chills. He does have frequent muscular contractions in his legs, which are moderately painful and is not sure what is causing them. No syncocpe, palpitations. Pt denies any other alleviating or exacerbating factors. No other associated signs or symptoms. There are no other complaints, changes or physical findings at this time.      PCP: Josias, MD Lui    Past History   All documented elements of the [address] reviewed and verified by me. -Nafisa Patterson MD    Past Medical History:  Past Medical History:   Diagnosis Date    [facility] 7/30/2015    Chronic kidney disease     pt on hemodialysis d/t reflux    Congenital hydroureteronephrosis  Crohn disease ([facility]     DVT (deep venous thrombosis) (HCC)          ESRD (end stage renal disease) ([facility]     on HD [id number]    Gastrointestinal disorder     Chron's    VUR (vesicoureteric reflux)        Past Surgical History:  Past Surgical History:   Procedure Laterality Date    HX OTHER SURGICAL  Pt has a filter for DVT's    HX OTHER SURGICAL      Marquis placed 3 weeks ago    HX VASCULAR ACCESS  1/14/14    CREATION LEFT UPPER ARM ARTERIO VENOUS GRAFT   (7mm PTFE)       Family History:  Family History   Problem Relation Age of Onset    Heart Disease Other     Kidney Disease Other         kidney stones    Diabetes Mother     Hypertension Mother        Social History:  Social History     Tobacco Use    Smoking status: Former Smoker    Smokeless tobacco: Never Used    Tobacco comment: quit about a year ago    Substance Use Topics    Alcohol use: No    Drug use: No       Allergies: Allergies   Allergen Reactions    Codeine Nausea and Vomiting    Iodinated Contrast Media Itching    Shellfish Derived Hives     And shrimp       Review of Systems   All other systems reviewed and negative    Review of Systems   Constitutional: Negative for fever. HENT: Negative. Eyes: Negative. Negative for visual disturbance. Respiratory: Negative for cough and shortness of breath. Cardiovascular: Negative for chest pain, palpitations and leg swelling. Gastrointestinal: Positive for nausea. Negative for abdominal pain and constipation. Endocrine: Negative. Negative for polydipsia and polyuria. Genitourinary: Negative for dysuria, enuresis, flank pain and hematuria. Musculoskeletal: Positive for myalgias. Negative for back pain and joint swelling. Skin: Negative. Negative for rash. Neurological: Negative for dizziness, speech difficulty, weakness, light-headedness, numbness and headaches. Hematological: Negative for adenopathy. Psychiatric/Behavioral: Negative for suicidal ideas.    All other systems reviewed and are negative. Physical Exam   Reviewed patients vital signs and nursing note    Physical Exam  Vitals signs and nursing note reviewed. HENT:      Head: Atraumatic. Mouth/Throat:      Mouth: Mucous membranes are moist.   Eyes:      General: No scleral icterus. Extraocular Movements: Extraocular movements intact. Conjunctiva/sclera: Conjunctivae normal.      Pupils: Pupils are equal, round, and reactive to light. Neck:      Musculoskeletal: Normal range of motion and neck supple. Cardiovascular:      Rate and Rhythm: Normal rate and regular rhythm. Pulses: Normal pulses. Heart sounds: Normal heart sounds. No murmur. Pulmonary:      Effort: Pulmonary effort is normal.      Breath sounds: Normal breath sounds. No rales. Abdominal:      General: Bowel sounds are normal.      Palpations: Abdomen is soft. Tenderness: There is no abdominal tenderness. Musculoskeletal: Normal range of motion. Right lower leg: No edema. Left lower leg: No edema. Skin:     General: Skin is warm and dry. Capillary Refill: Capillary refill takes less than 2 seconds. Findings: No rash. Neurological:      General: No focal deficit present. Mental Status: He is alert and oriented to person, place, and time. Psychiatric:         Attention and Perception: Attention normal. He does not perceive auditory or visual hallucinations. Speech: Speech normal.         Behavior: Behavior is cooperative. Thought Content: Thought content does not include homicidal or suicidal ideation. Diagnostic Study Results     Labs - I have personally reviewed and interpreted all laboratory results.  Angella Rod MD, MSc  Recent Results (from the past 24 hour(s))   CBC WITH AUTOMATED DIFF    Collection Time: 11/09/20  2:03 AM   Result Value Ref Range    WBC 7.4 4.1 - 11.1 K/uL    RBC 2.57 (L) 4.10 - 5.70 M/uL    HGB 7.8 (L) 12.1 - 17.0 g/dL    HCT 23.3 (L) 36.6 - 50.3 %    MCV 90.7 80.0 - 99.0 FL    MCH 30.4 26.0 - 34.0 PG    MCHC 33.5 30.0 - 36.5 g/dL    RDW 13.7 11.5 - 14.5 %    PLATELET 773 (L) 461 - 400 K/uL    MPV 11.8 8.9 - 12.9 FL    NRBC 0.0 0  WBC    ABSOLUTE NRBC 0.00 0.00 - 0.01 K/uL    NEUTROPHILS 67 32 - 75 %    LYMPHOCYTES 19 12 - 49 %    MONOCYTES 10 5 - 13 %    EOSINOPHILS 3 0 - 7 %    BASOPHILS 0 0 - 1 %    IMMATURE GRANULOCYTES 1 (H) 0.0 - 0.5 %    ABS. NEUTROPHILS 4.9 1.8 - 8.0 K/UL    ABS. LYMPHOCYTES 1.4 0.8 - 3.5 K/UL    ABS. MONOCYTES 0.8 0.0 - 1.0 K/UL    ABS. EOSINOPHILS 0.2 0.0 - 0.4 K/UL    ABS. BASOPHILS 0.0 0.0 - 0.1 K/UL    ABS. IMM. GRANS. 0.1 (H) 0.00 - 0.04 K/UL    DF AUTOMATED     METABOLIC PANEL, COMPREHENSIVE    Collection Time: 11/09/20  2:03 AM   Result Value Ref Range    Sodium 137 136 - 145 mmol/L    Potassium 3.6 3.5 - 5.1 mmol/L    Chloride 108 97 - 108 mmol/L    CO2 13 (LL) 21 - 32 mmol/L    Anion gap 16 (H) 5 - 15 mmol/L    Glucose 105 (H) 65 - 100 mg/dL     (H) 6 - 20 MG/DL    Creatinine 23.30 (H) 0.70 - 1.30 MG/DL    BUN/Creatinine ratio 7 (L) 12 - 20      GFR est AA 3 (L) >60 ml/min/1.73m2    GFR est non-AA 2 (L) >60 ml/min/1.73m2    Calcium <5.0 (LL) 8.5 - 10.1 MG/DL    Bilirubin, total 0.4 0.2 - 1.0 MG/DL    ALT (SGPT) 17 12 - 78 U/L    AST (SGOT) 31 15 - 37 U/L    Alk.  phosphatase 77 45 - 117 U/L    Protein, total 8.0 6.4 - 8.2 g/dL    Albumin 3.5 3.5 - 5.0 g/dL    Globulin 4.5 (H) 2.0 - 4.0 g/dL    A-G Ratio 0.8 (L) 1.1 - 2.2     URINALYSIS W/ REFLEX CULTURE    Collection Time: 11/09/20  3:23 AM    Specimen: Urine   Result Value Ref Range    Color YELLOW/STRAW      Appearance TURBID (A) CLEAR      Specific gravity 1.009 1.003 - 1.030      pH (UA) 6.0 5.0 - 8.0      Protein 100 (A) NEG mg/dL    Glucose Negative NEG mg/dL    Ketone Negative NEG mg/dL    Bilirubin Negative NEG      Blood MODERATE (A) NEG      Urobilinogen 0.2 0.2 - 1.0 EU/dL    Nitrites Negative NEG      Leukocyte Esterase LARGE (A) NEG      WBC >100 (H) 0 - 4 /hpf    RBC 10-20 0 - 5 /hpf    Epithelial cells FEW FEW /lpf    Bacteria Negative NEG /hpf    UA:UC IF INDICATED URINE CULTURE ORDERED (A) CNI     EKG, 12 LEAD, INITIAL    Collection Time: 11/09/20  3:31 AM   Result Value Ref Range    Ventricular Rate 63 BPM    Atrial Rate 63 BPM    P-R Interval 152 ms    QRS Duration 96 ms    Q-T Interval 508 ms    QTC Calculation (Bezet) 519 ms    Calculated P Axis 67 degrees    Calculated R Axis 7 degrees    Calculated T Axis 59 degrees    Diagnosis       Normal sinus rhythm  Moderate voltage criteria for LVH, may be normal variant  Nonspecific T wave abnormality  Prolonged QT  When compared with ECG of 03-JUL-2020 19:55,  T wave amplitude has decreased in Inferior leads  QT has lengthened     POC CHEM8    Collection Time: 11/09/20  4:41 AM   Result Value Ref Range    Calcium, ionized (POC) 0.53 (LL) 1.12 - 1.32 mmol/L    Sodium (POC) 140 136 - 145 mmol/L    Potassium (POC) 3.7 3.5 - 5.1 mmol/L    Chloride (POC) 107 98 - 107 mmol/L    CO2 (POC) 12 (LL) 21 - 32 mmol/L    Anion gap (POC) 25 (H) 10 - 20 mmol/L    Glucose (POC) 83 65 - 100 mg/dL    BUN (POC) >120 (H) 9 - 20 mg/dL    Creatinine (POC) >15.0 (H) 0.6 - 1.3 mg/dL    GFRAA, POC Cannot be calculated >60 ml/min/1.73m2    GFRNA, POC Cannot be calculated >60 ml/min/1.73m2    Hematocrit (POC) 29 (L) 36.6 - 50.3 %    Comment Notified RN or MD immediately by      PROTEIN/CREATININE RATIO, URINE    Collection Time: 11/09/20  4:47 AM   Result Value Ref Range    Protein, urine random 97 (H) 0.0 - 11.9 mg/dL    Creatinine, urine 65.10 mg/dL    Protein/Creat.  urine Ratio 1.5     POC EG7    Collection Time: 11/09/20  5:28 AM   Result Value Ref Range    Calcium, ionized (POC) 0.56 (LL) 1.12 - 1.32 mmol/L    pH (POC) 7.10 (LL) 7.35 - 7.45      pCO2 (POC) 33.6 (L) 35.0 - 45.0 MMHG    pO2 (POC) 54 (L) 80 - 100 MMHG    HCO3 (POC) 10.4 (L) 22 - 26 MMOL/L    Base deficit (POC) 19 mmol/L    sO2 (POC) 76 (L) 92 - 97 %    Site OTHER      Device: ROOM AIR      Allens test (POC) N/A      Specimen type (POC) VENOUS BLOOD      Total resp. rate 15         Radiologic Studies - I have personally reviewed and interpreted all imaging studies and agree with radiology interpretation and report. - Tone Rose MD, MSc  No orders to display         Medical Decision Making   I am the first provider for this patient. Records Reviewed: I reviewed our electronic medical record system for any past medical records that were available that may contribute to the patient's current condition, including their PMH, surgical history, social and family history. Reviewed the nursing notes and vital signs from today's visit. Nursing notes will be reviewed as they become available in realtime while the pt has been in the ED. In addition, I read most recent discharge summaries, if available and reviewed prior ECGs or imaging studies for comparison purposes. Tone Rose MD Msc    Vital Signs-Reviewed the patient's vital signs. Patient Vitals for the past 24 hrs:   Temp Pulse Resp BP SpO2   11/09/20 0615  (!) 50 12 116/80 100 %   11/09/20 0500  76 12 112/80 100 %   11/09/20 0337 98 °F (36.7 °C) 66 12 118/82 100 %   11/09/20 0030    109/67 100 %   11/08/20 2345    118/86 100 %   11/08/20 2255 98.3 °F (36.8 °C) 84 16 130/75 100 %       ECG interpretation: Normal sinus rhythm with rate 63, qtc 519, qrs narrow, no peaked t waves no ST elevations, depressions or other changes concerning for acute ischemia. This ECG has been viewed and interpreted by me personally. Tone Rose MD, Msc    Repeat ECG shows QTC narrowing slightly to 505. 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  He has not been dialyzed in appx 5 months and in light of that appears remarkably well. Most importantly, no respiratory distress whatsoever and o2 sat is 100% on RA. His vital signs are normal as is his physical exam, including normal mental status, no hyperreflexia or clonus, euvolemic. Patient immediately placed on monitor and ecg obtained. It surprisingly shows no signs typical of hyperkalemia. Plan: cbc, cmp. ua if possible with urine lytes. Reevaluation:  Patient clinically stable, however, he has several severe and potentially life-threatening abnormalities noted on labs that will require immediate treatment and hospitalization for monitoring, further management and urgent dialysis. Most notable is his severe hypocalcemia, which is consistent with the muscular twitching he describes. ecg also shows long qtc interval.  Immediately given 3 g calcium gluconate, once infused will recheck bmp and repeat ecg.  K normal.  Creatinine is significantly high. No evidence of uremic encephalopathy based on exam. All c/w esrd and the missed dialysis sessions. Venous blood gas shows that he is severely acidotic as well. All related to kidney failure. Will start bicarb infusion. Called nephro, they will see patient this am  Will admit to medicine, will need to be kept on telemetry at least.      6:48 AM  qtc improved. Will recheck chem 8    ED Course:   Initial assessment performed. The patients presenting problems have been discussed, and they are in agreement with the care plan formulated and outlined with them. I have encouraged them to ask questions as they arise throughout their visit. DISPOSITION: ADMIT  Patient is being admitted to the hospital.  Their test results and reasons for admission have been discussed. The patient and/or available family express agreement with and understanding of the need to be admitted and their admission diagnosis. Thank you for resuming the care of this patient.   Please don't hesitate to contact me in the emergency department if you  have any additional questions. Benja Najera MD, MSc    Consult Note:  Benja Najera MD spoke with nephrology, hospitalist,   Discussed pt's hx, physical exam and available diagnostic and imaging results. Reviewed care plans. Agree with management and plan thus far. Samson Pinto MD, am the attending of record for this patient encounter. CRITICAL CARE NOTE :    IMPENDING DETERIORATION -Metabolic and Renal  ASSOCIATED RISK FACTORS - Dysrhythmia and Metabolic changes  MANAGEMENT- Bedside Assessment and Supervision of Care  INTERPRETATION -  Blood Gases and ECG  INTERVENTIONS - hemodynamic mngmt and Metobolic interventions  CASE REVIEW - Hospitalist, Medical Sub-Specialist and Nursing  TREATMENT RESPONSE -Improved  PERFORMED BY - Self    NOTES   :    I have spent 40 minutes of critical care time involved in lab review, consultations with specialist, family decision- making, bedside attention and documentation. During this entire length of time I was immediately available to the patient . Critical Care: The reason for providing this level of medical care for this critically ill patient was due to a critical illness that impaired one or more vital organ systems, such that there was a high probability of imminent or life threatening deterioration in the patient's condition. This care involved high complexity decision making to assess, manipulate, and support vital system functions, to treat this degree of vital organ system failure, and to prevent further life threatening deterioration of the patients condition. Benja Najera MD      Diagnosis     Clinical Impression:   1. Acute renal failure superimposed on chronic kidney disease, on chronic dialysis, unspecified acute renal failure type ([facility]    2. ESRD (end stage renal disease) ([facility]    3. Hypocalcemia    4.  Metabolic acidosis        Attestation:  I personally performed the services described in this documentation on this date 11/8/2020 for patient Rayna Cuenca.   Senait Abel MD

## 2020-11-09 NOTE — PROGRESS NOTES
Admission med rec completed. Patient no longer taking PTA meds listed at admission. Will stop calcitriol, cholecalciferol, and sevelamer and defer to nephrology.

## 2020-11-10 ENCOUNTER — APPOINTMENT (OUTPATIENT)
Dept: NON INVASIVE DIAGNOSTICS | Age: 35
DRG: 640 | End: 2020-11-10
Attending: INTERNAL MEDICINE
Payer: MEDICARE

## 2020-11-10 VITALS
TEMPERATURE: 98.2 F | HEIGHT: 69 IN | BODY MASS INDEX: 19.79 KG/M2 | DIASTOLIC BLOOD PRESSURE: 66 MMHG | OXYGEN SATURATION: 100 % | RESPIRATION RATE: 18 BRPM | SYSTOLIC BLOOD PRESSURE: 120 MMHG | HEART RATE: 70 BPM | WEIGHT: 133.6 LBS

## 2020-11-10 LAB
ANION GAP SERPL CALC-SCNC: 20 MMOL/L (ref 5–15)
BACTERIA SPEC CULT: NORMAL
BASOPHILS # BLD: 0 K/UL (ref 0–0.1)
BASOPHILS NFR BLD: 1 % (ref 0–1)
BUN SERPL-MCNC: 169 MG/DL (ref 6–20)
BUN/CREAT SERPL: 8 (ref 12–20)
CALCIUM SERPL-MCNC: 5.3 MG/DL (ref 8.5–10.1)
CC UR VC: NORMAL
CHLORIDE SERPL-SCNC: 106 MMOL/L (ref 97–108)
CO2 SERPL-SCNC: 11 MMOL/L (ref 21–32)
CREAT SERPL-MCNC: 22.4 MG/DL (ref 0.7–1.3)
DIFFERENTIAL METHOD BLD: ABNORMAL
EOSINOPHIL # BLD: 0.3 K/UL (ref 0–0.4)
EOSINOPHIL NFR BLD: 4 % (ref 0–7)
ERYTHROCYTE [DISTWIDTH] IN BLOOD BY AUTOMATED COUNT: 13.3 % (ref 11.5–14.5)
GLUCOSE SERPL-MCNC: 103 MG/DL (ref 65–100)
HCT VFR BLD AUTO: 23.2 % (ref 36.6–50.3)
HGB BLD-MCNC: 7.8 G/DL (ref 12.1–17)
IMM GRANULOCYTES # BLD AUTO: 0 K/UL (ref 0–0.04)
IMM GRANULOCYTES NFR BLD AUTO: 1 % (ref 0–0.5)
LYMPHOCYTES # BLD: 1.5 K/UL (ref 0.8–3.5)
LYMPHOCYTES NFR BLD: 20 % (ref 12–49)
MAGNESIUM SERPL-MCNC: 1.4 MG/DL (ref 1.6–2.4)
MCH RBC QN AUTO: 30.1 PG (ref 26–34)
MCHC RBC AUTO-ENTMCNC: 33.6 G/DL (ref 30–36.5)
MCV RBC AUTO: 89.6 FL (ref 80–99)
MONOCYTES # BLD: 0.7 K/UL (ref 0–1)
MONOCYTES NFR BLD: 10 % (ref 5–13)
NEUTS SEG # BLD: 4.6 K/UL (ref 1.8–8)
NEUTS SEG NFR BLD: 64 % (ref 32–75)
NRBC # BLD: 0.02 K/UL (ref 0–0.01)
NRBC BLD-RTO: 0.3 PER 100 WBC
PHOSPHATE SERPL-MCNC: 10.4 MG/DL (ref 2.6–4.7)
PLATELET # BLD AUTO: 133 K/UL (ref 150–400)
PMV BLD AUTO: 11 FL (ref 8.9–12.9)
POTASSIUM SERPL-SCNC: 3.4 MMOL/L (ref 3.5–5.1)
RBC # BLD AUTO: 2.59 M/UL (ref 4.1–5.7)
SERVICE CMNT-IMP: NORMAL
SODIUM SERPL-SCNC: 137 MMOL/L (ref 136–145)
WBC # BLD AUTO: 7.1 K/UL (ref 4.1–11.1)

## 2020-11-10 PROCEDURE — 74011250636 HC RX REV CODE- 250/636: Performed by: NURSE PRACTITIONER

## 2020-11-10 PROCEDURE — 93005 ELECTROCARDIOGRAM TRACING: CPT

## 2020-11-10 PROCEDURE — 74011250637 HC RX REV CODE- 250/637: Performed by: NURSE PRACTITIONER

## 2020-11-10 PROCEDURE — 83735 ASSAY OF MAGNESIUM: CPT

## 2020-11-10 PROCEDURE — 74011000258 HC RX REV CODE- 258: Performed by: NURSE PRACTITIONER

## 2020-11-10 PROCEDURE — 36415 COLL VENOUS BLD VENIPUNCTURE: CPT

## 2020-11-10 PROCEDURE — 85025 COMPLETE CBC W/AUTO DIFF WBC: CPT

## 2020-11-10 PROCEDURE — 90935 HEMODIALYSIS ONE EVALUATION: CPT

## 2020-11-10 PROCEDURE — 84100 ASSAY OF PHOSPHORUS: CPT

## 2020-11-10 PROCEDURE — 74011250637 HC RX REV CODE- 250/637: Performed by: INTERNAL MEDICINE

## 2020-11-10 PROCEDURE — 5A1D70Z PERFORMANCE OF URINARY FILTRATION, INTERMITTENT, LESS THAN 6 HOURS PER DAY: ICD-10-PCS | Performed by: INTERNAL MEDICINE

## 2020-11-10 PROCEDURE — 74011000250 HC RX REV CODE- 250: Performed by: NURSE PRACTITIONER

## 2020-11-10 PROCEDURE — 80048 BASIC METABOLIC PNL TOTAL CA: CPT

## 2020-11-10 RX ORDER — MAGNESIUM SULFATE HEPTAHYDRATE 40 MG/ML
2 INJECTION, SOLUTION INTRAVENOUS ONCE
Status: COMPLETED | OUTPATIENT
Start: 2020-11-10 | End: 2020-11-10

## 2020-11-10 RX ORDER — CALCIUM ACETATE 667 MG/1
2 CAPSULE ORAL
Status: DISCONTINUED | OUTPATIENT
Start: 2020-11-10 | End: 2020-11-10 | Stop reason: HOSPADM

## 2020-11-10 RX ORDER — SODIUM BICARBONATE IN D5W 150/1000ML
PLASTIC BAG, INJECTION (ML) INTRAVENOUS CONTINUOUS
Status: DISCONTINUED | OUTPATIENT
Start: 2020-11-10 | End: 2020-11-10 | Stop reason: HOSPADM

## 2020-11-10 RX ADMIN — HEPARIN SODIUM 5000 UNITS: 5000 INJECTION INTRAVENOUS; SUBCUTANEOUS at 00:00

## 2020-11-10 RX ADMIN — NEPHROCAP 1 CAPSULE: 1 CAP ORAL at 09:03

## 2020-11-10 RX ADMIN — SODIUM BICARBONATE 150 MEQ/1,000 ML IN DEXTROSE 5 % INTRAVENOUS: SOLUTION at 12:32

## 2020-11-10 RX ADMIN — EPOETIN ALFA-EPBX 20000 UNITS: 10000 INJECTION, SOLUTION INTRAVENOUS; SUBCUTANEOUS at 00:00

## 2020-11-10 RX ADMIN — LORAZEPAM 0.5 MG: 0.5 TABLET ORAL at 00:00

## 2020-11-10 RX ADMIN — CALCIUM GLUCONATE 2 G: 98 INJECTION, SOLUTION INTRAVENOUS at 09:04

## 2020-11-10 RX ADMIN — CALCIUM GLUCONATE 2 G: 98 INJECTION, SOLUTION INTRAVENOUS at 00:00

## 2020-11-10 RX ADMIN — ACETAMINOPHEN: 500 TABLET ORAL at 01:26

## 2020-11-10 RX ADMIN — MAGNESIUM SULFATE IN WATER 2 G: 40 INJECTION, SOLUTION INTRAVENOUS at 11:16

## 2020-11-10 RX ADMIN — CALCIUM ACETATE 1334 MG: 667 CAPSULE ORAL at 11:20

## 2020-11-10 NOTE — DIALYSIS
TRANSFER - IN REPORT:    Verbal report received from Ignacia Renteria(name) on Janie Dwyer  being received from  2141  (unit) for ordered procedure      Report consisted of patients Situation, Background, Assessment and   Recommendations(SBAR). Information from the following report(s) SBAR was reviewed with the receiving nurse. Opportunity for questions and clarification was provided. Assessment completed upon patients arrival to unit and care assumed.

## 2020-11-10 NOTE — DIALYSIS
[facility]  [phone]    Vitals   Pre   Post   Assessment   Pre   Post     Temp  Temp: 98 °F (36.7 °C) (11/10/20 0332)  98 LOC  oriented x4 but drowsy Oriented but drowsy   HR   Pulse (Heart Rate): 62 (11/10/20 0400) 78 Lungs   Diminished on room air  diminished on room air   B/P   BP: 115/75 (11/10/20 0400) 110/65 Cardiac   B/p wnl  b/p wnl   Resp   Resp Rate: 16 (11/10/20 0400) 18 Skin   intact  intact   Pain level  0 0 Edema  None noted     none   Orders:    Duration:   Start:    0332 End:    0611 Total:   2hrs 10 mins   Dialyzer:   Dialyzer/Set Up Inspection: Shant Ramirez (11/10/20 0332)   K Bath:   Dialysate K (mEq/L): 4 (11/10/20 0332)   Ca Bath:   Dialysate CA (mEq/L): 2.5 (11/10/20 0332)   Na/Bicarb:   Dialysate NA (mEq/L): 138 (11/10/20 0332)   Target Fluid Removal:   Goal/Amount of Fluid to Remove (mL): 2000 mL (11/10/20 0332)   Access     Type & Location:   Rt arm graft. Good thrill and bruit noted. No signs of infection. Area cleaned and accessed with 15g needles x2 without any issues. Good blood flow noted from both sites.    Labs     Obtained/Reviewed   Critical Results Called   Date when labs were drawn-  Hgb-    HGB   Date Value Ref Range Status   11/10/2020 7.8 (L) 12.1 - 17.0 g/dL Final     K-    Potassium   Date Value Ref Range Status   11/09/2020 3.7 3.5 - 5.1 mmol/L Final     Ca-   Calcium   Date Value Ref Range Status   11/09/2020 5.1 (LL) 8.5 - 10.1 MG/DL Final     Comment:     RESULTS VERIFIED, PHONED TO AND READ BACK BY  DR Kulwinder Fraga [id number]       Bun-   BUN   Date Value Ref Range Status   11/09/2020 176 (H) 6 - 20 MG/DL Final     Creat-   Creatinine   Date Value Ref Range Status   11/09/2020 >40.00 (H) 0.70 - 1.30 MG/DL Final     Comment:     INVESTIGATED PER DELTA CHECK PROTOCOL  DR RONDON [address]  QNS TO REPEAT DILUTION          Medications/ Blood Products Given     Name   Dose   Route and Time        None ordered             Blood Volume Processed (BVP): 50.9 Net Fluid   Removed:  0   Comments   Time Out Done: 0320  Primary Nurse Rpt Pre:Ignacia Renteria RN  Primary Nurse Rpt Post:Ignacia Renteria RN  Pt Education:ESRD  Care Plan:ESRD  Tx Summary:  [id number] Dialysis started  0430 Pt c/o leg cramp, Uf turned off and 50ml of saline given to help with cramp  0500 UF turned back on but lowered at this time  0504 Pt now c/o nausea, 100ml of NS given but Uf still on   1326-0993 Pt vomiting and requesting to stop dialysis. Blood rinsed back at this time. Patient given a few minutes to finish vomiting and calm down. Pt encouraged to try and finish treatment because of its importance after about 10 minutes, pt agreed to continue with his treatment. Treatment continued at 0600. BFR decreased to 350 at this time. 1585 Pt c/o of not feeling well again (lightheaded and nausea again), requesting to end treatment again. Treatment stopped and blood rinsed back  Needles removed and pressure held till bleeding stopped. Dressing applied to each site. Pt stable  Pt only completed 2hrs and 10 minutes out of 3hrs and 30 minute treatment  **Recommend mannitol during next treatment and something for nausea as well**    Admiting Diagnosis:Non compliance with dialysis, low calcium  Pt's previous clinic-none  Consent signed - Informed Consent Verified: Yes (11/10/20 0332)  Hepatitis Status- Immune AB 11/9/20  Machine #- Machine Number: [id number] (11/10/20 6189)  Telemetry status-NSR  Pre-dialysis wt. -

## 2020-11-10 NOTE — DIALYSIS
TRANSFER - OUT REPORT:    Verbal report given to Ignacia Renteria(name) on [facility]  being transferred to  2141(unit) for ordered procedure       Report consisted of patients Situation, Background, Assessment and   Recommendations(SBAR). Information from the following report(s) SBAR and Procedure Summary was reviewed with the receiving nurse. Lines:   Peripheral IV 11/09/20 Left Arm (Active)   Site Assessment Clean, dry, & intact 11/10/20 0430   Phlebitis Assessment 0 11/10/20 0430   Infiltration Assessment 0 11/10/20 0430   Dressing Status Clean, dry, & intact 11/10/20 0430   Dressing Type Tape;Transparent 11/10/20 0430   Hub Color/Line Status Pink;Capped 11/10/20 0430   Alcohol Cap Used Yes 11/09/20 1814        Opportunity for questions and clarification was provided.       Patient transported with:   Registered Nurse

## 2020-11-10 NOTE — PROGRESS NOTES
Bedside and Verbal shift change report given to [facility], [facility] (oncoming nurse) by Noah Ovalle RN (offgoing nurse). Report included the following information SBAR, Kardex, Intake/Output and MAR.

## 2020-11-10 NOTE — PROGRESS NOTES
Nephrology Progress Note  Jewel Marques     www. [email or web address]  Phone - [phone]   Patient: Christian Zhang    YOB: 1985     Admit Date: 11/8/2020   Date- 11/10/2020     CC: Follow up for esrd      IMPRESSION & PLAN:    ESRD-he does not have any dialysis need as outpatient   History of noncompliance   Metabolic acidosis   History of hypertension   hyperphosphatemia   Anemia of CKD   Secondary hyperparathyroidism  PLAN-   He has dialysis earlier today. He had 2 hour 10 min hd.   He wants to wait till tomorrow for next hd   Check bmp now to follow acidosis   Give epogen 3 times a week    Check echo as his bp on lowside     Subjective: Interval History:   -  Patient finished hd at 6 am  He had vomiting earlier today  bp stable  No c/o sob  He had labs done before hd - severe acidosis     Mr. Maribel Gutierrez has past medical history of end-stage renal disease. He presented to ER with weakness. He has not been dialyzed for many weeks  He does not have any dialysis unit  He has been fired by multiple [facility] unit  He has history of noncompliance  He has right arm AV graft for dialysis  Complaint of weakness   c/o sob,    No c/o chest pain,   No c/o nausea or vomiting, No c/o  fever. ROS:- As documented above  Objective:   Vitals:    11/10/20 0600 11/10/20 0611 11/10/20 0724 11/10/20 0800   BP: 107/67 110/65 124/84    Pulse: 71 78 80 80   Resp: 18 20 18    Temp:  98 °F (36.7 °C) 97.9 °F (36.6 °C)    SpO2:   100%    Weight:       Height:          11/09 0701 - 11/10 0700  In: -   Out: 300 [Urine:100]  Last 3 Recorded Weights in this Encounter    11/08/20 2255 11/10/20 0033   Weight: 68.2 kg (150 lb 5.7 oz) 60.6 kg (133 lb 9.6 oz)      Physical exam:   GEN: nad  NECK- Supple, no mass  RESP: No wheezing, clear b/l  CVS: S1,S2  RRR  NEURO: Normal speech, non focal  Abdo- soft, NT  EXT: No Edema   PSYCH: Normal Mood  Right arm AV graft  Chart reviewed.          Pertinent Notes reviewed. Data Review :  Recent Labs     11/10/20  0332 11/09/20  2008 11/09/20  0848    135* 136   K 3.4* 3.7 3.4*    104 106   CO2 11* 13* 12*   * 176* 174*   CREA 22.40* >40.00* 22.90*   * 107* 143*   CA 5.3* 5.1* 5.2*   MG 1.4*  --   --    PHOS 10.4*  --   --      Recent Labs     11/10/20  0332 11/09/20  0203   WBC 7.1 7.4   HGB 7.8* 7.8*   HCT 23.2* 23.3*   * 149*     No results for input(s): FE, TIBC, PSAT, FERR in the last 72 hours.    Medication list  reviewed  Current Facility-Administered Medications   Medication Dose Route Frequency    magnesium sulfate 2 g/50 ml IVPB (premix or compounded)  2 g IntraVENous ONCE    calcium gluconate 2 g in 0.9% sodium chloride 100 mL IVPB  2 g IntraVENous ONCE    B complex-vitaminC-folic acid (NEPHROCAP) cap  1 Cap Oral DAILY    heparin (porcine) injection 5,000 Units  5,000 Units SubCUTAneous Q12H    epoetin tin-epbx (RETACRIT) injection 20,000 Units  20,000 Units SubCUTAneous Q MON, WED & FRI          Lowell Lindquist MD              [facility]  [facility] / [facility], [place], [address]  Phone - [phone]               Fax - [phone]

## 2020-11-10 NOTE — PROGRESS NOTES
Reason for Admission:   Hypocalcemia                    RUR Score:         17%            Plan for utilizing home health:  Possible recommendations for [address]         PCP: First and Last name:  UNKNOWN    Name of Practice:    Are you a current patient: Yes/No: UNKNOWN    Approximate date of last visit:  UNKNOWN   Can you participate in a virtual visit with your PCP: Yes, if recommended                     Current Advanced Directive/Advance Care Plan: FULL-Pt wish to not disclose medical decision maker at this time                          Transition of Care Plan:                      CM completed room visit with pt, with attempts to verify information. Pt was falling asleep while assessment was in progress. Pt reported that he resides with family in their 2 tory home. Pt reported no PCP, and he uses [facility]). Pt is known to be independent with ADLs and stated that he drives. Pt reported no DME and SNF. Pt reported [address] in the past.    Pt did not want to report anyone as his medical decision maker. CM attempted contact with pt's emergency contact: Isaiah Phan, put attempt was unsuccessful. CM reviewed pt H&P, and it is reported that pt is a current dialysis pt that isnt compliant with dialysis clinic. It was observed in clinicals that pt has missed 5 months of dialysis. CM will verify with MD pt's d/c needs and plans. CM will verify dialysis clinic that pt receives treatment from. Pt could be a possible candidate for palliative at this time, to address code status and goals of care. CM will leave FYI for MD.  CM will continue to follow. Care Management Interventions  PCP Verified by CM: Yes  Mode of Transport at Discharge: Other (see comment)  Transition of Care Consult (CM Consult): Discharge Planning  Discharge Durable Medical Equipment: No  Physical Therapy Consult: No  Occupational Therapy Consult: No  Speech Therapy Consult: No  Current Support Network:  Other, Relative's Home  Confirm Follow Up Transport: Family  Discharge Location  Discharge Placement: [address], MSW, [facility]

## 2020-11-10 NOTE — PROGRESS NOTES
Received notification from bedside RN about patient with regards to: requesting sleeping aid medication    Intervention given: Tylenol PM x1

## 2020-11-10 NOTE — PROGRESS NOTES
1450 Patient seen at bedside, he is requesting to leave AMA. Patient stated he is going to follow up with a different nephrologist. Discussed risk of leaving including death with electrolyte imbalances and need for further HD. Patient verbalized understanding and continues to wish to sign out AMA.

## 2020-11-10 NOTE — PROGRESS NOTES
I reviewed pertinent labs and imaging, and discussed /agreed on the plan of care with Dr. Solo Nicholas. Hospitalist Progress Note    NAME: Palmer Brittle   :  1985   MRN:  [id number]       Assessment / Plan:  ESRD Cr >40,  on admission   Noncompliance - no HD in 5 months   Severe Hypocalcemia, Ca 5.0 on admission   Anion Gap Acidosis   Hypomagnesemia   Hypokalemia   · Continues to make urine - reports frequency and amount have decreased   · Appreciate Nephrology input - will receive HD again tomorrow   · Has received 8 mg of Calcium gluconate IV since admission - will receive additional 2 mg this AM  · Ca 5.3, recheck after additional dose of Calcium gluconate today   · Magnesium 1.4 today, replete with IV and recheck today   · Follow daily CMP  · Anion Gap 20 this AM - resume slow bicarb drip  · Ionized Calcium drawn on admission but unable to find results   · Continue nephrocap, phoslo   · K 3.4 - replete with PO   · Check ECHO with borderline low BP   · Appreciate Case Management - assist with setting up OP HD if able. Patient has been noncompliant and fired from OP HD previously. Prolonged QTc with electrolyte imbalance   · QTC on    · Recheck EKG today   · Follow CMP   · Monitor on Telemetry     Hypoxemia - resolved   · Now on RA    Anemia of Chronic Disease   Thrombocytopenia  · Continue epoetin three times a week    · Hgb stable - continue to monitor    Hyperglycemia   · No treatment indicated at this time  · Follow on daily labs     Crohn's Disease without Acute Flare     History of DVTs   · Patient reports remote history   · Not on any anticoagulation PTA  · Continue heparin injections    18.5 - 24.9 Normal weight / Body mass index is 19.73 kg/m². Code status: Full  Prophylaxis: Hep SQ  Recommended Disposition: Home w/Family     Subjective:     Chief Complaint / Reason for Physician Visit  \"I feel OK\". Patient seen while lying in bed, denies any complaints at this time.  Patient not very talkative, updated on plan of care. Will receive HD tomorrow. Discussed with RN events overnight. Review of Systems:  Symptom Y/N Comments  Symptom Y/N Comments   Fever/Chills n   Chest Pain n    Poor Appetite n   Edema n    Cough n   Abdominal Pain n    Sputum n   Joint Pain n    SOB/NGUYEN n   Pruritis/Rash n    Nausea/vomit n   Tolerating PT/OT n    Diarrhea n   Tolerating Diet y    Constipation n   Other       Could NOT obtain due to:      Objective:     VITALS:   Last 24hrs VS reviewed since prior progress note. Most recent are:  Patient Vitals for the past 24 hrs:   Temp Pulse Resp BP SpO2   11/10/20 0800  80      11/10/20 0724 97.9 °F (36.6 °C) 80 18 124/84 100 %   11/10/20 0611 98 °F (36.7 °C) 78 20 110/65    11/10/20 0600  71 18 107/67    11/10/20 0530  87 20 119/75    11/10/20 0515  (!) 58 18 116/63    11/10/20 0504  73 18 110/72    11/10/20 0500  69 18 124/74    11/10/20 0445  60 18 109/69    11/10/20 0430  60 18 115/70    11/10/20 0415  (!) 57 16 103/75    11/10/20 0400  62 16 115/75    11/10/20 0345  (!) 58 16 109/74    11/10/20 0332 98 °F (36.7 °C) 65 18 106/73    11/10/20 0033  (!) 59 16 118/74 100 %   11/09/20 2117 98.3 °F (36.8 °C) 64 16 111/74 100 %   11/09/20 1558  60      11/09/20 1523 97.9 °F (36.6 °C) 60 16 117/76 100 %       Intake/Output Summary (Last 24 hours) at 11/10/2020 1148  Last data filed at 11/10/2020 0880  Gross per 24 hour   Intake    Output 300 ml   Net -300 ml        PHYSICAL EXAM:  General: WD, WN. Alert, cooperative, no acute distress, AAM    EENT:  EOMI. Anicteric sclerae. MMM  Resp:  CTA bilaterally, no wheezing or rales. No accessory muscle use  CV:  Regular  rhythm,  No edema  GI:  Soft, Non distended, Non tender.  +Bowel sounds  Neurologic:  Alert and oriented X 3, normal speech,   Psych:   Good insight. Not anxious nor agitated  Skin:  No rashes.   No jaundice    Reviewed most current lab test results and cultures YES  Reviewed most current radiology test results   YES  Review and summation of old records today    NO  Reviewed patient's current orders and MAR    YES  PMH/SH reviewed - no change compared to H&P  ________________________________________________________________________  Care Plan discussed with:    Comments   Patient x    Family      RN x    Care Manager     Consultant  x                      Multidiciplinary team rounds were held today with , nursing, pharmacist and clinical coordinator. Patient's plan of care was discussed; medications were reviewed and discharge planning was addressed. ________________________________________________________________________  Total NON critical care TIME:  25   Minutes    Total CRITICAL CARE TIME Spent:   Minutes non procedure based      Comments   >50% of visit spent in counseling and coordination of care x    ________________________________________________________________________  Hyacinth Johnson NP     Procedures: see electronic medical records for all procedures/Xrays and details which were not copied into this note but were reviewed prior to creation of Plan. LABS:  I reviewed today's most current labs and imaging studies.   Pertinent labs include:  Recent Labs     11/10/20  0332 11/09/20  0203   WBC 7.1 7.4   HGB 7.8* 7.8*   HCT 23.2* 23.3*   * 149*     Recent Labs     11/10/20  0332 11/09/20  2008 11/09/20  0848 11/09/20  0203    135* 136 137   K 3.4* 3.7 3.4* 3.6    104 106 108   CO2 11* 13* 12* 13*   * 107* 143* 105*   * 176* 174* 173*   CREA 22.40* >40.00* 22.90* 23.30*   CA 5.3* 5.1* 5.2* <5.0*   MG 1.4*  --   --   --    PHOS 10.4*  --   --   --    ALB  --   --   --  3.5   TBILI  --   --   --  0.4   ALT  --   --   --  17       Signed: Hyacinth Johnson, NP

## 2020-11-10 NOTE — PROGRESS NOTES
Labs reviewed, patient remains hypocalcemic. Magnesium pending. Calcium gluconate 2 gm IVPB ordered. Repeat labs following calcium infusion. No carpopedal spasm, no tetany, and no seizure activity. Maintain telemetry. Repeat 12-lead EKG in a.m.

## 2020-11-10 NOTE — PROGRESS NOTES
EKG reviewed. QTc 510. SR without ectopy. Labs pending. Sign out given to Nocturnist NP to follow up on labs and d/c bicarb gtt depending on acid/base status or if patient goes to HD.

## 2020-11-10 NOTE — PROGRESS NOTES
REJI:    HD arranged      CM notified today that pt is in need of setting up HD at clinic, however, pt has left AMA. CM will notify  of the following.     TORRIE Buckner, [address] [place] [address]

## 2020-11-10 NOTE — PROGRESS NOTES
Called dialysis to find out when pt would get dialysis. It should be done tonight but will be late. There are two pt's in line aheqd of him at this time.

## 2020-11-10 NOTE — PROGRESS NOTES
Pt request to leave AMA, providers made aware. IV and tele box removed from pt. Pt signed and NP signed all documents. Pt dressed and has all belongings. Educated per NP about leaving AMA. Discharged AMA with providers aware, pt arranged own transport.      Ara Smith RN

## 2020-11-10 NOTE — DISCHARGE SUMMARY
Patient left Against Medical Advice. Please see previous progress note. Hospitalist Discharge Summary     Patient ID:  Lesley Lovell  [id number]  84 y.o.  1985 11/8/2020    PCP on record: Lui Ferrara MD    Admit date: 11/8/2020  Discharge date and time: 11/10/2020    DISCHARGE DIAGNOSIS:    Patient Active Problem List   Diagnosis Code    Hydroureteronephrosis N13.30    ESRD on dialysis ([facility] N18.6, Z99.2    ESRF (end stage renal failure) ([facility]) N18.6    Vesicoureteral reflux with nephropathy N13.729    Crohn disease ([facility] K50.90    Acute on chronic renal failure ([facility] N17.9, N18.9    Acute internal jugular vein thrombosis ([facility] I82. C19    Thrombosis of arteriovenous dialysis fistula ([facility]) T82.868A    Bilateral hydronephrosis N13.30    Hypocalcemia I07.69    Metabolic acidosis R74.0       CONSULTATIONS:  IP CONSULT TO NEPHROLOGY    Excerpted HPI from H&P of Debra Lyn MD:  Drea Florez is a 30 y.o.  male with PMH significant for congenital hydroureteronephrosis, ESRD, Crohn's disease, and remote DVT who presented to the ED with complaints of generalized malaise, paresthesia (feeling as if needles were pricking him all over his body), and muscle twitching. Additionally, the patient reports that he has not gone to dialysis in approximately 5 months. Of note, he does continue to make urine and complains of urinary frequency of small amounts of urine. An EKG was completed in the ED which revealed broad QTC. Labs revealed anion gap acidosis, mild hyperglycemia, and severe hypocalcemia. Interestingly, the patient's serum potassium was WNL. In the ED the patient received a total of 4 g of calcium gluconate IV and was started on a bicarb drip. Nephrology consult was requested. Repeat EKG did reveal improvement in QTC (down to 505).    We were asked to admit for work up and evaluation of the above problems. ______________________________________________________________________  DISCHARGE SUMMARY/HOSPITAL COURSE:  for full details see H&P, daily progress notes, labs, consult notes. ESRD Cr >40,  on admission   Noncompliance - no HD in 5 months   Severe Hypocalcemia, Ca 5.0 on admission   Anion Gap Acidosis   Hypomagnesemia   Hypokalemia   · Continues to make urine - reports frequency and amount have decreased   · Appreciate Nephrology input - will receive HD again tomorrow   · Has received 8 mg of Calcium gluconate IV since admission - will receive additional 2 mg this AM  · Ca 5.3, recheck after additional dose of Calcium gluconate today   · Magnesium 1.4 today, replete with IV and recheck today   · Follow daily CMP  · Anion Gap 20 this AM - resume slow bicarb drip  · Ionized Calcium drawn on admission but unable to find results   · Continue nephrocap, phoslo   · K 3.4 - replete with PO   · Check ECHO with borderline low BP   · Appreciate Case Management - assist with setting up OP HD if able. Patient has been noncompliant and fired from OP HD previously. Prolonged QTc with electrolyte imbalance   · QTC on  11/9  · Recheck EKG today   · Follow CMP   · Monitor on Telemetry   Hypoxemia - resolved   · Now on RA  Anemia of Chronic Disease   Thrombocytopenia  · Continue epoetin three times a week    · Hgb stable - continue to monitor  Hyperglycemia   · No treatment indicated at this time  · Follow on daily labs   Crohn's Disease without Acute Flare   History of DVTs   · Patient reports remote history   · Not on any anticoagulation PTA  · Continue heparin injections    Patient seen at bedside, he is requesting to leave AMA. Patient stated he is going to follow up with a different nephrologist. Discussed risk of leaving including death with electrolyte imbalances and need for further HD. Patient verbalized understanding and continues to wish to sign out AMA. _______________________________________________________________________  Patient seen and examined by me on discharge day. Pertinent Findings:  Gen:    Not in distress  Chest: Clear lungs  CVS:   Regular rhythm.   No edema  Abd:  Soft, not distended, not tender  Neuro:  Alert,   _______________________________________________________________________  DISCHARGE MEDICATIONS:   Current Discharge Medication List            Patient Follow Up Instructions:   None - patient left AMA     Follow-up Information    None       ________________________________________________________________    Risk of deterioration: High    Condition at Discharge:  Stable  __________________________________________________________________    Disposition  Home with family, no needs    ____________________________________________________________________    Code Status: Full Code  ___________________________________________________________________      Total time in minutes spent coordinating this discharge (includes going over instructions, follow-up, prescriptions, and preparing report for sign off to her PCP) :  >30 minutes    Signed:  Cristobal Bonilla NP

## 2020-11-11 LAB
ATRIAL RATE: 60 BPM
ATRIAL RATE: 60 BPM
CALCULATED P AXIS, ECG09: 65 DEGREES
CALCULATED P AXIS, ECG09: 76 DEGREES
CALCULATED R AXIS, ECG10: 15 DEGREES
CALCULATED R AXIS, ECG10: 51 DEGREES
CALCULATED T AXIS, ECG11: 38 DEGREES
CALCULATED T AXIS, ECG11: 48 DEGREES
DIAGNOSIS, 93000: NORMAL
DIAGNOSIS, 93000: NORMAL
P-R INTERVAL, ECG05: 150 MS
P-R INTERVAL, ECG05: 152 MS
Q-T INTERVAL, ECG07: 446 MS
Q-T INTERVAL, ECG07: 510 MS
QRS DURATION, ECG06: 112 MS
QRS DURATION, ECG06: 98 MS
QTC CALCULATION (BEZET), ECG08: 446 MS
QTC CALCULATION (BEZET), ECG08: 510 MS
VENTRICULAR RATE, ECG03: 60 BPM
VENTRICULAR RATE, ECG03: 60 BPM

## 2022-03-18 PROBLEM — E83.51 HYPOCALCEMIA: Status: ACTIVE | Noted: 2020-11-09

## 2022-03-19 PROBLEM — N13.30 BILATERAL HYDRONEPHROSIS: Status: ACTIVE | Noted: 2019-09-30

## 2022-03-19 PROBLEM — E87.20 METABOLIC ACIDOSIS: Status: ACTIVE | Noted: 2020-11-09

## 2022-05-27 NOTE — PERIOP NOTES
[address], [address] [facility]   MAIN OR                                  [phone]   MAIN PRE OP                          [phone]                                                                                AMBULATORY PRE OP          [phone]  PRE-ADMISSION TESTING    [phone]     Surgery Date:  6/6 and 6/14       Is surgery arrival time given by surgeon? NO  If NO, [facility] staff will call you between 3 and 7pm the day before your surgery with your arrival time. (If your surgery is on a Monday, we will call you the Friday before.)    Call [phone] after 7pm Monday-Friday if you did not receive this call. INSTRUCTIONS BEFORE YOUR SURGERY   When You  Arrive Arrive at the 2nd [facility] on the day of your surgery  Have your insurance card, photo ID, and any copayment (if needed)   Food   and   Drink NO food or drink after midnight the night before surgery    This means NO water, gum, mints, coffee, juice, etc.  No alcohol (beer, wine, liquor) 24 hours before and after surgery   Medications to   TAKE   Morning of Surgery MEDICATIONS TO TAKE THE MORNING OF SURGERY WITH A SIP OF WATER:    None   Medications  To  STOP      7 days before surgery  Non-Steroidal anti-inflammatory Drugs (NSAID's): for example, Ibuprofen (Advil, Motrin), Naproxen (Aleve)   Aspirin, if taking for pain    Herbal supplements, vitamins, and fish oil   Other:  (Pain medications not listed above, including Tylenol may be taken)   Blood  Thinners  If you take  Aspirin, Plavix, Coumadin, or any blood-thinning or anti-blood clot medicine, talk to the doctor who prescribed the medications for pre-operative instructions.    Bathing Clothing  Jewelry  Valuables      If you shower the morning of surgery, please do not apply anything to your skin (lotions, powders, deodorant, or makeup, especially mascara)   Follow [employer] body wash instructions provided to you during PAT appointment. Begin 3 days prior to surgery.  Do not shave or trim anywhere 24 hours before surgery   Wear your hair loose or down; no pony-tails, buns, or metal hair clips   Wear loose, comfortable, clean clothes   Wear glasses instead of contacts  [employer] money, valuables, and jewelry, including body piercings, at home   If you were given an [facility], bring it on day of surgery. Going Home - or Spending the Night  SAME-DAY SURGERY: You must have a responsible adult drive you home and stay with you 24 hours after surgery   ADMITS: If your doctor is keeping you in the hospital after surgery, leave personal belongings/luggage in your car until you have a hospital room number. Hospital discharge time is 12 noon  Drivers must be here before 12 noon unless you are told differently   Special Instructions        Follow all instructions so your surgery wont be cancelled. Please, be on time. If a situation occurs and you are delayed the day of surgery, call [phone]     If your physical condition changes (like a fever, cold, flu, etc.) call your surgeon. Home medication(s) reviewed and verified verbally with list during PAT appointment. The patient was contacted via phone. The patient verbalizes understanding of all instructions and does not need reinforcement.

## 2022-06-01 ENCOUNTER — HOSPITAL ENCOUNTER (OUTPATIENT)
Dept: PREADMISSION TESTING | Age: 37
Discharge: HOME | End: 2022-06-01
Payer: MEDICARE

## 2022-06-01 VITALS
OXYGEN SATURATION: 100 % | SYSTOLIC BLOOD PRESSURE: 123 MMHG | WEIGHT: 161.6 LBS | DIASTOLIC BLOOD PRESSURE: 74 MMHG | BODY MASS INDEX: 25.97 KG/M2 | TEMPERATURE: 97.8 F | HEIGHT: 66 IN | RESPIRATION RATE: 18 BRPM | HEART RATE: 81 BPM

## 2022-06-01 LAB
ABO + RH BLD: NORMAL
ALBUMIN SERPL-MCNC: 3 G/DL (ref 3.5–5)
ALBUMIN/GLOB SERPL: 0.7 {RATIO} (ref 1.1–2.2)
ALP SERPL-CCNC: 116 U/L (ref 45–117)
ALT SERPL-CCNC: 19 U/L (ref 12–78)
ANION GAP SERPL CALC-SCNC: 6 MMOL/L (ref 5–15)
APPEARANCE UR: CLEAR
AST SERPL-CCNC: 17 U/L (ref 15–37)
BACTERIA URNS QL MICRO: NEGATIVE /HPF
BASOPHILS # BLD: 0 K/UL (ref 0–0.1)
BASOPHILS NFR BLD: 1 % (ref 0–1)
BILIRUB SERPL-MCNC: 0.6 MG/DL (ref 0.2–1)
BILIRUB UR QL: NEGATIVE
BLOOD GROUP ANTIBODIES SERPL: NORMAL
BUN SERPL-MCNC: 40 MG/DL (ref 6–20)
BUN/CREAT SERPL: 4 (ref 12–20)
CALCIUM SERPL-MCNC: 7.5 MG/DL (ref 8.5–10.1)
CHLORIDE SERPL-SCNC: 103 MMOL/L (ref 97–108)
CO2 SERPL-SCNC: 31 MMOL/L (ref 21–32)
COLOR UR: ABNORMAL
CREAT SERPL-MCNC: 11.3 MG/DL (ref 0.7–1.3)
DIFFERENTIAL METHOD BLD: ABNORMAL
EOSINOPHIL # BLD: 0.2 K/UL (ref 0–0.4)
EOSINOPHIL NFR BLD: 3 % (ref 0–7)
EPITH CASTS URNS QL MICRO: ABNORMAL /LPF
ERYTHROCYTE [DISTWIDTH] IN BLOOD BY AUTOMATED COUNT: 17.6 % (ref 11.5–14.5)
GLOBULIN SER CALC-MCNC: 4.5 G/DL (ref 2–4)
GLUCOSE SERPL-MCNC: 106 MG/DL (ref 65–100)
GLUCOSE UR STRIP.AUTO-MCNC: NEGATIVE MG/DL
HCT VFR BLD AUTO: 33.2 % (ref 36.6–50.3)
HGB BLD-MCNC: 10.5 G/DL (ref 12.1–17)
HGB UR QL STRIP: ABNORMAL
HYALINE CASTS URNS QL MICRO: ABNORMAL /LPF (ref 0–2)
IMM GRANULOCYTES # BLD AUTO: 0 K/UL (ref 0–0.04)
IMM GRANULOCYTES NFR BLD AUTO: 1 % (ref 0–0.5)
KETONES UR QL STRIP.AUTO: NEGATIVE MG/DL
LEUKOCYTE ESTERASE UR QL STRIP.AUTO: ABNORMAL
LYMPHOCYTES # BLD: 1.2 K/UL (ref 0.8–3.5)
LYMPHOCYTES NFR BLD: 21 % (ref 12–49)
MCH RBC QN AUTO: 31.3 PG (ref 26–34)
MCHC RBC AUTO-ENTMCNC: 31.6 G/DL (ref 30–36.5)
MCV RBC AUTO: 98.8 FL (ref 80–99)
MONOCYTES # BLD: 0.4 K/UL (ref 0–1)
MONOCYTES NFR BLD: 7 % (ref 5–13)
NEUTS SEG # BLD: 3.9 K/UL (ref 1.8–8)
NEUTS SEG NFR BLD: 67 % (ref 32–75)
NITRITE UR QL STRIP.AUTO: NEGATIVE
NRBC # BLD: 0 K/UL (ref 0–0.01)
NRBC BLD-RTO: 0 PER 100 WBC
PH UR STRIP: 8.5 [PH] (ref 5–8)
PLATELET # BLD AUTO: 188 K/UL (ref 150–400)
PMV BLD AUTO: 10.2 FL (ref 8.9–12.9)
POTASSIUM SERPL-SCNC: 4.2 MMOL/L (ref 3.5–5.1)
PROT SERPL-MCNC: 7.5 G/DL (ref 6.4–8.2)
PROT UR STRIP-MCNC: 100 MG/DL
RBC # BLD AUTO: 3.36 M/UL (ref 4.1–5.7)
RBC #/AREA URNS HPF: ABNORMAL /HPF (ref 0–5)
SODIUM SERPL-SCNC: 140 MMOL/L (ref 136–145)
SP GR UR REFRACTOMETRY: 1.01 (ref 1–1.03)
SPECIMEN EXP DATE BLD: NORMAL
UA: UC IF INDICATED,UAUC: ABNORMAL
UROBILINOGEN UR QL STRIP.AUTO: 0.2 EU/DL (ref 0.2–1)
WBC # BLD AUTO: 5.8 K/UL (ref 4.1–11.1)
WBC URNS QL MICRO: ABNORMAL /HPF (ref 0–4)

## 2022-06-01 PROCEDURE — 85025 COMPLETE CBC W/AUTO DIFF WBC: CPT

## 2022-06-01 PROCEDURE — 86900 BLOOD TYPING SEROLOGIC ABO: CPT

## 2022-06-01 PROCEDURE — 87086 URINE CULTURE/COLONY COUNT: CPT

## 2022-06-01 PROCEDURE — 36415 COLL VENOUS BLD VENIPUNCTURE: CPT

## 2022-06-01 PROCEDURE — 81001 URINALYSIS AUTO W/SCOPE: CPT

## 2022-06-01 PROCEDURE — 80053 COMPREHEN METABOLIC PANEL: CPT

## 2022-06-02 LAB
BACTERIA SPEC CULT: NORMAL
SERVICE CMNT-IMP: NORMAL

## 2022-06-02 NOTE — H&P (VIEW-ONLY)
History and Physical    Patient: Enriqueta Cole MRN: [id number]  SSN: [id number]    YOB: 1985  Age: 40 y.o. Sex: male      CC: ESRD    Subjective:      Enriqueta Cole is a 40 y.o. male referred for pre-operative evaluation by Dr. Mony Leonard for surgery on 22 & 22. Jose Pelayo is currently receiving dialysis three times a week. He notes he thinks his kidney is dead and that is why he is having surgery. He notes he dialysis is not working. He currently has a right arm fistula. The patient was evaluated in the surgeon's office and it was determined that the most appropriate plan of care is to proceed with surgical intervention. Patient's PCP Other, MD Lui    Past Medical History:   Diagnosis Date    Acute renal failure (ARF) ([address] 2015    Congenital hydroureteronephrosis     Crohn disease ([address]     Dialysis patient ([address]     DVT (deep venous thrombosis) ([address]          ESRD (end stage renal disease) ([address]  to present    Presence of IVC filter     VUR (vesicoureteric reflux)      Past Surgical History:   Procedure Laterality Date    HX ARTERIOVENOUS FISTULA Right     current    HX PARTIAL COLECTOMY      HX VASCULAR ACCESS Left 2014    CREATION LEFT UPPER ARM ARTERIO VENOUS GRAFT   (7mm PTFE)    IR IVC FILTER        Family History   Problem Relation Age of Onset    Heart Disease Other     Kidney Disease Other         kidney stones    Diabetes Mother     Hypertension Mother      Social History     Tobacco Use    Smoking status: Former Smoker     Quit date:      Years since quittin.4    Smokeless tobacco: Never Used   Substance Use Topics    Alcohol use: No    Drug use: No      Prior to Admission medications    Medication Sig Start Date End Date Taking? Authorizing Provider   OTHER Take 1 Tablet by mouth three (3) times daily (with meals).  Phosphorus/Calcium Binders for dialysis   Yes Provider, Historical        Allergies   Allergen Reactions    Codeine Nausea and Vomiting    Iodinated Contrast Media Itching    Shellfish Derived Hives     And shrimp       Review of Systems:  Constitutional: Negative for chills and fever  HENT: Negative for congestion and sore throat  Eyes: negative for blurred vision and double vision  Respiratory: Negative for cough, shortness of breath and wheezing  Mouth: Negative for loose, broken or chipped teeth. Cardiovascular: Negative for chest pain and palpitations  Gastrointestinal: Negative for abdominal pain, constipation, diarrhea and nausea  Genitourinary: Negative for dysuria and hematuria  Musculoskeletal: Negative for joint pain  Skin: Negative for rash, open wounds.    Neurological: Negative for dizziness, tremors and headaches  Psychiatric: Negative for anxiety    Objective:     Vitals:    06/01/22 1429   BP: 123/74   Pulse: 81   Resp: 18   Temp: 97.8 °F (36.6 °C)   SpO2: 100%   Weight: 73.3 kg (161 lb 9.6 oz)   Height: 5' 6\" (1.676 m)        Physical Exam:  General Appearance: Alert, Well Appearing and in no distress  Mental Status: Normal mood, behavior, speech and dress  Neck: Normal appearance externally  Ears: External canal no drainage  Nose: Normal external appearance and no drainage   Chest: Clear to auscultation, no wheezes, rales or rhonchi  Heart: Normal rate, regular rhythm, no murmurs, rubs, clicks or gallops  Skin: Normal color, no lesions externally  Abdomen: Not examined  Neuro: Not examined  Musculoskeletal: Normal gait    Recent Results (from the past 168 hour(s))   CBC WITH AUTOMATED DIFF    Collection Time: 06/01/22  2:57 PM   Result Value Ref Range    WBC 5.8 4.1 - 11.1 K/uL    RBC 3.36 (L) 4.10 - 5.70 M/uL    HGB 10.5 (L) 12.1 - 17.0 g/dL    HCT 33.2 (L) 36.6 - 50.3 %    MCV 98.8 80.0 - 99.0 FL    MCH 31.3 26.0 - 34.0 PG    MCHC 31.6 30.0 - 36.5 g/dL    RDW 17.6 (H) 11.5 - 14.5 %    PLATELET 872 881 - 699 K/uL    MPV 10.2 8.9 - 12.9 FL    NRBC 0.0 0  WBC    ABSOLUTE NRBC 0.00 0.00 - 0.01 K/uL NEUTROPHILS 67 32 - 75 %    LYMPHOCYTES 21 12 - 49 %    MONOCYTES 7 5 - 13 %    EOSINOPHILS 3 0 - 7 %    BASOPHILS 1 0 - 1 %    IMMATURE GRANULOCYTES 1 (H) 0.0 - 0.5 %    ABS. NEUTROPHILS 3.9 1.8 - 8.0 K/UL    ABS. LYMPHOCYTES 1.2 0.8 - 3.5 K/UL    ABS. MONOCYTES 0.4 0.0 - 1.0 K/UL    ABS. EOSINOPHILS 0.2 0.0 - 0.4 K/UL    ABS. BASOPHILS 0.0 0.0 - 0.1 K/UL    ABS. IMM. GRANS. 0.0 0.00 - 0.04 K/UL    DF AUTOMATED     METABOLIC PANEL, COMPREHENSIVE    Collection Time: 06/01/22  2:57 PM   Result Value Ref Range    Sodium 140 136 - 145 mmol/L    Potassium 4.2 3.5 - 5.1 mmol/L    Chloride 103 97 - 108 mmol/L    CO2 31 21 - 32 mmol/L    Anion gap 6 5 - 15 mmol/L    Glucose 106 (H) 65 - 100 mg/dL    BUN 40 (H) 6 - 20 MG/DL    Creatinine 11.30 (H) 0.70 - 1.30 MG/DL    BUN/Creatinine ratio 4 (L) 12 - 20      GFR est AA 6 (L) >60 ml/min/1.73m2    GFR est non-AA 5 (L) >60 ml/min/1.73m2    Calcium 7.5 (L) 8.5 - 10.1 MG/DL    Bilirubin, total 0.6 0.2 - 1.0 MG/DL    ALT (SGPT) 19 12 - 78 U/L    AST (SGOT) 17 15 - 37 U/L    Alk.  phosphatase 116 45 - 117 U/L    Protein, total 7.5 6.4 - 8.2 g/dL    Albumin 3.0 (L) 3.5 - 5.0 g/dL    Globulin 4.5 (H) 2.0 - 4.0 g/dL    A-G Ratio 0.7 (L) 1.1 - 2.2     URINALYSIS W/ REFLEX CULTURE    Collection Time: 06/01/22  2:57 PM    Specimen: Urine   Result Value Ref Range    Color YELLOW/STRAW      Appearance CLEAR CLEAR      Specific gravity 1.007 1.003 - 1.030      pH (UA) 8.5 (H) 5.0 - 8.0      Protein 100 (A) NEG mg/dL    Glucose Negative NEG mg/dL    Ketone Negative NEG mg/dL    Bilirubin Negative NEG      Blood SMALL (A) NEG      Urobilinogen 0.2 0.2 - 1.0 EU/dL    Nitrites Negative NEG      Leukocyte Esterase LARGE (A) NEG      UA:UC IF INDICATED URINE CULTURE ORDERED (A) CNI      WBC 20-50 0 - 4 /hpf    RBC 0-5 0 - 5 /hpf    Epithelial cells MANY (A) FEW /lpf    Bacteria Negative NEG /hpf    Hyaline cast 0-2 0 - 2 /lpf   TYPE & SCREEN    Collection Time: 06/01/22  2:57 PM   Result Value Ref Range    Crossmatch Expiration 06/15/2022,8647     ABO/Rh(D) AB POSITIVE     Antibody screen NEG    CULTURE, URINE    Collection Time: 06/01/22  2:57 PM    Specimen: Urine   Result Value Ref Range    Special Requests: NO SPECIAL REQUESTS  Reflexed from [id number]        Culture result: No growth (<1,000 CFU/ML)       CXR Results  (Last 48 hours)    None          Assessment:     ESRD  Pre-Operative Evaluation    Plan:     Cystoscopy on 6/6/22  Right Nephrectomy on 6/14/22  Urine culture negative  Labs and EKG reviewed. Signed By: Eric Bedner NP     Vianney 3, 2022       I will be performing procedure for Dr Ciarra Huber. Surgical site properly marked. Questions answered.

## 2022-06-02 NOTE — H&P
History and Physical    Patient: Arsalan Rutherford MRN: [id number]  SSN: [id number]    YOB: 1985  Age: 40 y.o. Sex: male      CC: ESRD    Subjective:      Arsalan Rutherford is a 40 y.o. male referred for pre-operative evaluation by Dr. Royal Sapp for surgery on 22 & 22. Damaris Oneal is currently receiving dialysis three times a week. He notes he thinks his kidney is dead and that is why he is having surgery. He notes he dialysis is not working. He currently has a right arm fistula. The patient was evaluated in the surgeon's office and it was determined that the most appropriate plan of care is to proceed with surgical intervention. Patient's PCP Other, MD Lui    Past Medical History:   Diagnosis Date    Acute renal failure (ARF) ([address] 2015    Congenital hydroureteronephrosis     Crohn disease ([address]     Dialysis patient ([address]     DVT (deep venous thrombosis) ([address]          ESRD (end stage renal disease) ([address]  to present    Presence of IVC filter     VUR (vesicoureteric reflux)      Past Surgical History:   Procedure Laterality Date    HX ARTERIOVENOUS FISTULA Right     current    HX PARTIAL COLECTOMY      HX VASCULAR ACCESS Left 2014    CREATION LEFT UPPER ARM ARTERIO VENOUS GRAFT   (7mm PTFE)    IR IVC FILTER        Family History   Problem Relation Age of Onset    Heart Disease Other     Kidney Disease Other         kidney stones    Diabetes Mother     Hypertension Mother      Social History     Tobacco Use    Smoking status: Former Smoker     Quit date:      Years since quittin.4    Smokeless tobacco: Never Used   Substance Use Topics    Alcohol use: No    Drug use: No      Prior to Admission medications    Medication Sig Start Date End Date Taking? Authorizing Provider   OTHER Take 1 Tablet by mouth three (3) times daily (with meals).  Phosphorus/Calcium Binders for dialysis   Yes Provider, Historical        Allergies   Allergen Reactions    Codeine Nausea and Vomiting    Iodinated Contrast Media Itching    Shellfish Derived Hives     And shrimp       Review of Systems:  Constitutional: Negative for chills and fever  HENT: Negative for congestion and sore throat  Eyes: negative for blurred vision and double vision  Respiratory: Negative for cough, shortness of breath and wheezing  Mouth: Negative for loose, broken or chipped teeth. Cardiovascular: Negative for chest pain and palpitations  Gastrointestinal: Negative for abdominal pain, constipation, diarrhea and nausea  Genitourinary: Negative for dysuria and hematuria  Musculoskeletal: Negative for joint pain  Skin: Negative for rash, open wounds.    Neurological: Negative for dizziness, tremors and headaches  Psychiatric: Negative for anxiety    Objective:     Vitals:    06/01/22 1429   BP: 123/74   Pulse: 81   Resp: 18   Temp: 97.8 °F (36.6 °C)   SpO2: 100%   Weight: 73.3 kg (161 lb 9.6 oz)   Height: 5' 6\" (1.676 m)        Physical Exam:  General Appearance: Alert, Well Appearing and in no distress  Mental Status: Normal mood, behavior, speech and dress  Neck: Normal appearance externally  Ears: External canal no drainage  Nose: Normal external appearance and no drainage   Chest: Clear to auscultation, no wheezes, rales or rhonchi  Heart: Normal rate, regular rhythm, no murmurs, rubs, clicks or gallops  Skin: Normal color, no lesions externally  Abdomen: Not examined  Neuro: Not examined  Musculoskeletal: Normal gait    Recent Results (from the past 168 hour(s))   CBC WITH AUTOMATED DIFF    Collection Time: 06/01/22  2:57 PM   Result Value Ref Range    WBC 5.8 4.1 - 11.1 K/uL    RBC 3.36 (L) 4.10 - 5.70 M/uL    HGB 10.5 (L) 12.1 - 17.0 g/dL    HCT 33.2 (L) 36.6 - 50.3 %    MCV 98.8 80.0 - 99.0 FL    MCH 31.3 26.0 - 34.0 PG    MCHC 31.6 30.0 - 36.5 g/dL    RDW 17.6 (H) 11.5 - 14.5 %    PLATELET 385 786 - 417 K/uL    MPV 10.2 8.9 - 12.9 FL    NRBC 0.0 0  WBC    ABSOLUTE NRBC 0.00 0.00 - 0.01 K/uL NEUTROPHILS 67 32 - 75 %    LYMPHOCYTES 21 12 - 49 %    MONOCYTES 7 5 - 13 %    EOSINOPHILS 3 0 - 7 %    BASOPHILS 1 0 - 1 %    IMMATURE GRANULOCYTES 1 (H) 0.0 - 0.5 %    ABS. NEUTROPHILS 3.9 1.8 - 8.0 K/UL    ABS. LYMPHOCYTES 1.2 0.8 - 3.5 K/UL    ABS. MONOCYTES 0.4 0.0 - 1.0 K/UL    ABS. EOSINOPHILS 0.2 0.0 - 0.4 K/UL    ABS. BASOPHILS 0.0 0.0 - 0.1 K/UL    ABS. IMM. GRANS. 0.0 0.00 - 0.04 K/UL    DF AUTOMATED     METABOLIC PANEL, COMPREHENSIVE    Collection Time: 06/01/22  2:57 PM   Result Value Ref Range    Sodium 140 136 - 145 mmol/L    Potassium 4.2 3.5 - 5.1 mmol/L    Chloride 103 97 - 108 mmol/L    CO2 31 21 - 32 mmol/L    Anion gap 6 5 - 15 mmol/L    Glucose 106 (H) 65 - 100 mg/dL    BUN 40 (H) 6 - 20 MG/DL    Creatinine 11.30 (H) 0.70 - 1.30 MG/DL    BUN/Creatinine ratio 4 (L) 12 - 20      GFR est AA 6 (L) >60 ml/min/1.73m2    GFR est non-AA 5 (L) >60 ml/min/1.73m2    Calcium 7.5 (L) 8.5 - 10.1 MG/DL    Bilirubin, total 0.6 0.2 - 1.0 MG/DL    ALT (SGPT) 19 12 - 78 U/L    AST (SGOT) 17 15 - 37 U/L    Alk.  phosphatase 116 45 - 117 U/L    Protein, total 7.5 6.4 - 8.2 g/dL    Albumin 3.0 (L) 3.5 - 5.0 g/dL    Globulin 4.5 (H) 2.0 - 4.0 g/dL    A-G Ratio 0.7 (L) 1.1 - 2.2     URINALYSIS W/ REFLEX CULTURE    Collection Time: 06/01/22  2:57 PM    Specimen: Urine   Result Value Ref Range    Color YELLOW/STRAW      Appearance CLEAR CLEAR      Specific gravity 1.007 1.003 - 1.030      pH (UA) 8.5 (H) 5.0 - 8.0      Protein 100 (A) NEG mg/dL    Glucose Negative NEG mg/dL    Ketone Negative NEG mg/dL    Bilirubin Negative NEG      Blood SMALL (A) NEG      Urobilinogen 0.2 0.2 - 1.0 EU/dL    Nitrites Negative NEG      Leukocyte Esterase LARGE (A) NEG      UA:UC IF INDICATED URINE CULTURE ORDERED (A) CNI      WBC 20-50 0 - 4 /hpf    RBC 0-5 0 - 5 /hpf    Epithelial cells MANY (A) FEW /lpf    Bacteria Negative NEG /hpf    Hyaline cast 0-2 0 - 2 /lpf   TYPE & SCREEN    Collection Time: 06/01/22  2:57 PM   Result Value Ref Range    Crossmatch Expiration 06/15/2022,2352     ABO/Rh(D) AB POSITIVE     Antibody screen NEG    CULTURE, URINE    Collection Time: 06/01/22  2:57 PM    Specimen: Urine   Result Value Ref Range    Special Requests: NO SPECIAL REQUESTS  Reflexed from [id number]        Culture result: No growth (<1,000 CFU/ML)       CXR Results  (Last 48 hours)    None          Assessment:     ESRD  Pre-Operative Evaluation    Plan:     Cystoscopy on 6/6/22  Right Nephrectomy on 6/14/22  Urine culture negative  Labs and EKG reviewed. Signed By: Favian Aguilar NP     Vianney 3, 2022       I will be performing procedure for Dr Phillip Chase. Surgical site properly marked. Questions answered.

## 2022-06-03 ENCOUNTER — ANESTHESIA EVENT (OUTPATIENT)
Dept: SURGERY | Age: 37
End: 2022-06-03
Payer: MEDICARE

## 2022-06-06 ENCOUNTER — ANESTHESIA (OUTPATIENT)
Dept: SURGERY | Age: 37
End: 2022-06-06
Payer: MEDICARE

## 2022-06-06 ENCOUNTER — APPOINTMENT (OUTPATIENT)
Dept: GENERAL RADIOLOGY | Age: 37
End: 2022-06-06
Attending: UROLOGY
Payer: MEDICARE

## 2022-06-06 ENCOUNTER — HOSPITAL ENCOUNTER (OUTPATIENT)
Age: 37
Setting detail: OUTPATIENT SURGERY
Discharge: HOME | End: 2022-06-06
Attending: UROLOGY | Admitting: UROLOGY
Payer: MEDICARE

## 2022-06-06 VITALS
WEIGHT: 161 LBS | SYSTOLIC BLOOD PRESSURE: 117 MMHG | TEMPERATURE: 97.6 F | OXYGEN SATURATION: 95 % | DIASTOLIC BLOOD PRESSURE: 68 MMHG | BODY MASS INDEX: 25.88 KG/M2 | HEIGHT: 66 IN | RESPIRATION RATE: 16 BRPM | HEART RATE: 58 BPM

## 2022-06-06 LAB
ALBUMIN SERPL-MCNC: 2.8 G/DL (ref 3.5–5)
ALBUMIN SERPL-MCNC: 3 G/DL (ref 3.5–5)
ALBUMIN/GLOB SERPL: 0.6 {RATIO} (ref 1.1–2.2)
ALBUMIN/GLOB SERPL: 0.7 {RATIO} (ref 1.1–2.2)
ALP SERPL-CCNC: 102 U/L (ref 45–117)
ALP SERPL-CCNC: 112 U/L (ref 45–117)
ALT SERPL-CCNC: 13 U/L (ref 12–78)
ALT SERPL-CCNC: 21 U/L (ref 12–78)
ANION GAP SERPL CALC-SCNC: 9 MMOL/L (ref 5–15)
ANION GAP SERPL CALC-SCNC: 9 MMOL/L (ref 5–15)
AST SERPL-CCNC: 14 U/L (ref 15–37)
AST SERPL-CCNC: ABNORMAL U/L (ref 15–37)
BILIRUB SERPL-MCNC: 0.6 MG/DL (ref 0.2–1)
BILIRUB SERPL-MCNC: 0.6 MG/DL (ref 0.2–1)
BUN SERPL-MCNC: 45 MG/DL (ref 6–20)
BUN SERPL-MCNC: 46 MG/DL (ref 6–20)
BUN/CREAT SERPL: 3 (ref 12–20)
BUN/CREAT SERPL: 3 (ref 12–20)
CALCIUM SERPL-MCNC: 6.1 MG/DL (ref 8.5–10.1)
CALCIUM SERPL-MCNC: 6.2 MG/DL (ref 8.5–10.1)
CHLORIDE SERPL-SCNC: 104 MMOL/L (ref 97–108)
CHLORIDE SERPL-SCNC: 105 MMOL/L (ref 97–108)
CO2 SERPL-SCNC: 25 MMOL/L (ref 21–32)
CO2 SERPL-SCNC: 26 MMOL/L (ref 21–32)
CREAT SERPL-MCNC: 14.2 MG/DL (ref 0.7–1.3)
CREAT SERPL-MCNC: 14.5 MG/DL (ref 0.7–1.3)
GLOBULIN SER CALC-MCNC: 4.5 G/DL (ref 2–4)
GLOBULIN SER CALC-MCNC: 4.6 G/DL (ref 2–4)
GLUCOSE SERPL-MCNC: 89 MG/DL (ref 65–100)
GLUCOSE SERPL-MCNC: 95 MG/DL (ref 65–100)
POTASSIUM SERPL-SCNC: 5.7 MMOL/L (ref 3.5–5.1)
POTASSIUM SERPL-SCNC: ABNORMAL MMOL/L (ref 3.5–5.1)
PROT SERPL-MCNC: 7.4 G/DL (ref 6.4–8.2)
PROT SERPL-MCNC: 7.5 G/DL (ref 6.4–8.2)
SODIUM SERPL-SCNC: 138 MMOL/L (ref 136–145)
SODIUM SERPL-SCNC: 140 MMOL/L (ref 136–145)

## 2022-06-06 PROCEDURE — 74011000250 HC RX REV CODE- 250: Performed by: NURSE ANESTHETIST, CERTIFIED REGISTERED

## 2022-06-06 PROCEDURE — 80053 COMPREHEN METABOLIC PANEL: CPT

## 2022-06-06 PROCEDURE — 74011000250 HC RX REV CODE- 250: Performed by: UROLOGY

## 2022-06-06 PROCEDURE — 36415 COLL VENOUS BLD VENIPUNCTURE: CPT

## 2022-06-06 PROCEDURE — 77030020143 HC AIRWY LARYN INTUB CGAS -A: Performed by: ANESTHESIOLOGY

## 2022-06-06 PROCEDURE — C1769 GUIDE WIRE: HCPCS | Performed by: UROLOGY

## 2022-06-06 PROCEDURE — 77030040361 HC SLV COMPR DVT MDII -B

## 2022-06-06 PROCEDURE — C1758 CATHETER, URETERAL: HCPCS | Performed by: UROLOGY

## 2022-06-06 PROCEDURE — 76060000032 HC ANESTHESIA 0.5 TO 1 HR: Performed by: UROLOGY

## 2022-06-06 PROCEDURE — 2709999900 HC NON-CHARGEABLE SUPPLY: Performed by: UROLOGY

## 2022-06-06 PROCEDURE — 76210000016 HC OR PH I REC 1 TO 1.5 HR: Performed by: UROLOGY

## 2022-06-06 PROCEDURE — 74420 UROGRAPHY RTRGR +-KUB: CPT

## 2022-06-06 PROCEDURE — 74011250636 HC RX REV CODE- 250/636: Performed by: UROLOGY

## 2022-06-06 PROCEDURE — 74011250636 HC RX REV CODE- 250/636: Performed by: NURSE ANESTHETIST, CERTIFIED REGISTERED

## 2022-06-06 PROCEDURE — 77030040922 HC BLNKT HYPOTHRM STRY -A

## 2022-06-06 PROCEDURE — 76210000020 HC REC RM PH II FIRST 0.5 HR: Performed by: UROLOGY

## 2022-06-06 PROCEDURE — 76010000138 HC OR TIME 0.5 TO 1 HR: Performed by: UROLOGY

## 2022-06-06 PROCEDURE — 74011000636 HC RX REV CODE- 636: Performed by: UROLOGY

## 2022-06-06 RX ORDER — MIDAZOLAM HYDROCHLORIDE 1 MG/ML
INJECTION, SOLUTION INTRAMUSCULAR; INTRAVENOUS AS NEEDED
Status: DISCONTINUED | OUTPATIENT
Start: 2022-06-06 | End: 2022-06-06 | Stop reason: HOSPADM

## 2022-06-06 RX ORDER — SODIUM CHLORIDE, SODIUM LACTATE, POTASSIUM CHLORIDE, CALCIUM CHLORIDE 600; 310; 30; 20 MG/100ML; MG/100ML; MG/100ML; MG/100ML
125 INJECTION, SOLUTION INTRAVENOUS CONTINUOUS
Status: DISCONTINUED | OUTPATIENT
Start: 2022-06-06 | End: 2022-06-06 | Stop reason: HOSPADM

## 2022-06-06 RX ORDER — ONDANSETRON 2 MG/ML
INJECTION INTRAMUSCULAR; INTRAVENOUS AS NEEDED
Status: DISCONTINUED | OUTPATIENT
Start: 2022-06-06 | End: 2022-06-06 | Stop reason: HOSPADM

## 2022-06-06 RX ORDER — EPHEDRINE SULFATE/0.9% NACL/PF 50 MG/5 ML
SYRINGE (ML) INTRAVENOUS AS NEEDED
Status: DISCONTINUED | OUTPATIENT
Start: 2022-06-06 | End: 2022-06-06

## 2022-06-06 RX ORDER — SODIUM CHLORIDE 9 MG/ML
INJECTION, SOLUTION INTRAVENOUS
Status: DISCONTINUED | OUTPATIENT
Start: 2022-06-06 | End: 2022-06-06 | Stop reason: HOSPADM

## 2022-06-06 RX ORDER — AMOXICILLIN AND CLAVULANATE POTASSIUM 500; 125 MG/1; MG/1
1 TABLET, FILM COATED ORAL EVERY EVENING
Qty: 30 TABLET | Refills: 0 | Status: SHIPPED | OUTPATIENT
Start: 2022-06-06

## 2022-06-06 RX ORDER — FENTANYL CITRATE 50 UG/ML
INJECTION, SOLUTION INTRAMUSCULAR; INTRAVENOUS AS NEEDED
Status: DISCONTINUED | OUTPATIENT
Start: 2022-06-06 | End: 2022-06-06 | Stop reason: HOSPADM

## 2022-06-06 RX ORDER — DEXAMETHASONE SODIUM PHOSPHATE 4 MG/ML
INJECTION, SOLUTION INTRA-ARTICULAR; INTRALESIONAL; INTRAMUSCULAR; INTRAVENOUS; SOFT TISSUE AS NEEDED
Status: DISCONTINUED | OUTPATIENT
Start: 2022-06-06 | End: 2022-06-06 | Stop reason: HOSPADM

## 2022-06-06 RX ORDER — HYDROMORPHONE HYDROCHLORIDE 1 MG/ML
.25-1 INJECTION, SOLUTION INTRAMUSCULAR; INTRAVENOUS; SUBCUTANEOUS
Status: DISCONTINUED | OUTPATIENT
Start: 2022-06-06 | End: 2022-06-06 | Stop reason: HOSPADM

## 2022-06-06 RX ORDER — DIPHENHYDRAMINE HYDROCHLORIDE 50 MG/ML
12.5 INJECTION, SOLUTION INTRAMUSCULAR; INTRAVENOUS AS NEEDED
Status: DISCONTINUED | OUTPATIENT
Start: 2022-06-06 | End: 2022-06-06 | Stop reason: HOSPADM

## 2022-06-06 RX ORDER — LIDOCAINE HYDROCHLORIDE 20 MG/ML
INJECTION, SOLUTION EPIDURAL; INFILTRATION; INTRACAUDAL; PERINEURAL AS NEEDED
Status: DISCONTINUED | OUTPATIENT
Start: 2022-06-06 | End: 2022-06-06 | Stop reason: HOSPADM

## 2022-06-06 RX ORDER — FLUMAZENIL 0.1 MG/ML
0.2 INJECTION INTRAVENOUS
Status: DISCONTINUED | OUTPATIENT
Start: 2022-06-06 | End: 2022-06-06 | Stop reason: HOSPADM

## 2022-06-06 RX ORDER — LIDOCAINE HCL/PF 100 MG/5ML
SYRINGE (ML) INTRAVENOUS AS NEEDED
Status: DISCONTINUED | OUTPATIENT
Start: 2022-06-06 | End: 2022-06-06 | Stop reason: HOSPADM

## 2022-06-06 RX ORDER — NALOXONE HYDROCHLORIDE 0.4 MG/ML
0.2 INJECTION, SOLUTION INTRAMUSCULAR; INTRAVENOUS; SUBCUTANEOUS
Status: DISCONTINUED | OUTPATIENT
Start: 2022-06-06 | End: 2022-06-06 | Stop reason: HOSPADM

## 2022-06-06 RX ORDER — EPHEDRINE SULFATE/0.9% NACL/PF 50 MG/5 ML
SYRINGE (ML) INTRAVENOUS AS NEEDED
Status: DISCONTINUED | OUTPATIENT
Start: 2022-06-06 | End: 2022-06-06 | Stop reason: HOSPADM

## 2022-06-06 RX ORDER — PROPOFOL 10 MG/ML
INJECTION, EMULSION INTRAVENOUS AS NEEDED
Status: DISCONTINUED | OUTPATIENT
Start: 2022-06-06 | End: 2022-06-06 | Stop reason: HOSPADM

## 2022-06-06 RX ORDER — LIDOCAINE HYDROCHLORIDE 10 MG/ML
0.1 INJECTION, SOLUTION EPIDURAL; INFILTRATION; INTRACAUDAL; PERINEURAL AS NEEDED
Status: DISCONTINUED | OUTPATIENT
Start: 2022-06-06 | End: 2022-06-06 | Stop reason: HOSPADM

## 2022-06-06 RX ADMIN — Medication 10 MG: at 08:53

## 2022-06-06 RX ADMIN — FENTANYL CITRATE 50 MCG: 50 INJECTION, SOLUTION INTRAMUSCULAR; INTRAVENOUS at 08:15

## 2022-06-06 RX ADMIN — PROPOFOL 50 MG: 10 INJECTION, EMULSION INTRAVENOUS at 08:46

## 2022-06-06 RX ADMIN — FENTANYL CITRATE 50 MCG: 50 INJECTION, SOLUTION INTRAMUSCULAR; INTRAVENOUS at 08:39

## 2022-06-06 RX ADMIN — SODIUM CHLORIDE: 9 INJECTION, SOLUTION INTRAVENOUS at 08:37

## 2022-06-06 RX ADMIN — CEFAZOLIN SODIUM 2 G: 1 POWDER, FOR SOLUTION INTRAMUSCULAR; INTRAVENOUS at 08:50

## 2022-06-06 RX ADMIN — DEXAMETHASONE SODIUM PHOSPHATE 4 MG: 4 INJECTION, SOLUTION INTRAMUSCULAR; INTRAVENOUS at 08:51

## 2022-06-06 RX ADMIN — Medication 10 MG: at 09:07

## 2022-06-06 RX ADMIN — ONDANSETRON HYDROCHLORIDE 4 MG: 2 SOLUTION INTRAMUSCULAR; INTRAVENOUS at 08:51

## 2022-06-06 RX ADMIN — PROPOFOL 150 MG: 10 INJECTION, EMULSION INTRAVENOUS at 08:45

## 2022-06-06 RX ADMIN — LIDOCAINE HYDROCHLORIDE 60 MG: 20 INJECTION, SOLUTION EPIDURAL; INFILTRATION; INTRACAUDAL; PERINEURAL at 08:45

## 2022-06-06 RX ADMIN — MIDAZOLAM 2 MG: 1 INJECTION, SOLUTION INTRAMUSCULAR; INTRAVENOUS at 08:37

## 2022-06-06 NOTE — BRIEF OP NOTE
Brief Postoperative Note    Patient: Christina Abbott  YOB: 1985  MRN: [id number]    Date of Procedure: 6/6/2022     Pre-Op Diagnosis: RENAL INSUFFICENCY, right ureteral obstruction    Post-Op Diagnosis: Same as preoperative diagnosis.       Procedure(s):  CYSTOSCOPY, CYSTOGRAM BILATERAL RETROGRADE RIGHT INTRAGRADE NEPHROSTOGRAM    Surgeon(s):  Deena Tobin MD    Surgical Assistant: None    Anesthesia: General     Estimated Blood Loss (mL): Minimal    Complications: None    Specimens: * No specimens in log *     Implants: * No implants in log *    Drains: * No LDAs found *    Findings: obstruction near renal pelvis    Electronically Signed by Ceci Littlejohn MD on 6/6/2022 at 9:34 AM

## 2022-06-06 NOTE — ANESTHESIA PREPROCEDURE EVALUATION
Relevant Problems   RENAL FAILURE   (+) Acute on chronic renal failure (HCC)   (+) Bilateral hydronephrosis   (+) ESRD on dialysis ([address]   (+) ESRF (end stage renal failure) (HCC)   (+) Hydroureteronephrosis       Anesthetic History   No history of anesthetic complications            Review of Systems / Medical History  Patient summary reviewed, nursing notes reviewed and pertinent labs reviewed    Pulmonary  Within defined limits                 Neuro/Psych   Within defined limits           Cardiovascular  Within defined limits                     GI/Hepatic/Renal         Renal disease (last HD 3 days ago): ESRD       Endo/Other  Within defined limits           Other Findings   Comments: chrons disease    History of DVT    IVC filter         Physical Exam    Airway  Mallampati: III  TM Distance: 4 - 6 cm  Neck ROM: normal range of motion   Mouth opening: Diminished (comment)     Cardiovascular    Rhythm: regular  Rate: normal         Dental    Dentition: Lower dentition intact and Upper dentition intact     Pulmonary  Breath sounds clear to auscultation               Abdominal  GI exam deferred       Other Findings            Anesthetic Plan    ASA: 2  Anesthesia type: general          Induction: Intravenous  Anesthetic plan and risks discussed with: Patient

## 2022-06-06 NOTE — DISCHARGE INSTRUCTIONS
Patient Education        Cystoscopy: What to Expect at [address] [facility]     A cystoscopy is a procedure that lets a doctor look inside of the bladder and the urethra. The urethra is the tube that carries urine from the bladder to outside the body. The doctor uses a thin, lighted tool called a cystoscope. Your bladder is filled with fluid. This stretches the bladder so that your doctor can look closely at the inside of your bladder. After the cystoscopy, your urethra may be sore at first, and it may burn when you urinate for the first few days after the procedure. You may feel the need to urinate more often, and your urine may be pink. These symptoms should get better in 1 or 2 days. You will probably be able to go back to most of your usual activities in 1 or 2 days. This care sheet gives you a general idea about how long it will take for you to recover. But each person recovers at a different pace. Follow the steps below to get better as quickly as possible. How can you care for yourself at home? Activity    · Rest when you feel tired. Getting enough sleep will help you recover.     · Try to walk each day. Start by walking a little more than you did the day before. Bit by bit, increase the amount you walk. Walking boosts blood flow and helps prevent pneumonia and constipation.     · Avoid strenuous activities, such as bicycle riding, jogging, weight lifting, or aerobic exercise, until your doctor says it is okay.     · Ask your doctor when you can drive again.     · Most people are able to return to work within 1 or 2 days after the procedure.     · You may shower and take baths as usual.     · Ask your doctor when it is okay for you to have sex. Diet    · You can eat your normal diet. If your stomach is upset, try bland, low-fat foods like plain rice, broiled chicken, toast, and yogurt.     · Drink plenty of fluids (unless your doctor tells you not to).    Medicines    · Take pain medicines exactly as directed. ? If the doctor gave you a prescription medicine for pain, take it as prescribed. ? If you are not taking a prescription pain medicine, ask your doctor if you can take an over-the-counter medicine.     · If you think your pain medicine is making you sick to your stomach:  ? Take your medicine after meals (unless your doctor has told you not to). ? Ask your doctor for a different pain medicine.     · If your doctor prescribed antibiotics, take them as directed. Do not stop taking them just because you feel better. You need to take the full course of antibiotics. Follow-up care is a key part of your treatment and safety. Be sure to make and go to all appointments, and call your doctor if you are having problems. It's also a good idea to know your test results and keep a list of the medicines you take. When should you call for help? Call 911 anytime you think you may need emergency care. For example, call if:    · You passed out (lost consciousness).     · You have severe trouble breathing.     · You have sudden chest pain and shortness of breath, or you cough up blood.     · You have severe belly pain. Call your doctor now or seek immediate medical care if:    · You are sick to your stomach or cannot keep fluids down.     · Your urine is still red or you see blood clots after you have urinated several times.     · You have trouble passing urine or stool, especially if you have pain or swelling in your lower belly.     · You have signs of a blood clot, such as:  ? Pain in your calf, back of the knee, thigh, or groin. ? Redness and swelling in your leg or groin.     · You develop a fever or severe chills.     · You have pain in your back just below your rib cage. This is called flank pain. Watch closely for changes in your health, and be sure to contact your doctor if:    · You have pain or burning when you urinate.  A burning feeling is normal for a day or two after the test, but call if it does not get better.     · You have a frequent urge to urinate but can pass only small amounts of urine.     · Your urine is pink, red, or cloudy, or smells bad. It is normal for the urine to have a pinkish color for a few days after the test, but call if it does not get better. Where can you learn more? Go to http://www.gray.com/  Enter C842 in the search box to learn more about \"Cystoscopy: What to Expect at Home. \"  Current as of: October 18, 2021               Content Version: 13.2  © 3811-1426 [employer]. Care instructions adapted under license by [employer] (which disclaims liability or warranty for this information). If you have questions about a medical condition or this instruction, always ask your healthcare professional. [address] any warranty or liability for your use of this information.

## 2022-06-06 NOTE — ANESTHESIA POSTPROCEDURE EVALUATION
Procedure(s):  CYSTOSCOPY, CYSTOGRAM BILATERAL RETROGRADE RIGHT INTRAGRADE NEPHROSTOGRAM.    general    Anesthesia Post Evaluation        Patient location during evaluation: PACU  Patient participation: complete - patient participated  Level of consciousness: sleepy but conscious and awake  Pain management: adequate  Airway patency: patent  Anesthetic complications: no  Cardiovascular status: acceptable and stable  Respiratory status: acceptable and unassisted  Hydration status: acceptable  Comments: The patient was seen and evaluated in the post-operative period. The time of my evaluation may not match the time of this note. The patient denied uncontrolled pain or nausea, and there were no significant complications evident. Noemi President, MD      Post anesthesia nausea and vomiting:  none  Final Post Anesthesia Temperature Assessment:  Normothermia (36.0-37.5 degrees C)      INITIAL Post-op Vital signs:   Vitals Value Taken Time   /69 06/06/22 1030   Temp 36.4 °C (97.6 °F) 06/06/22 0942   Pulse 61 06/06/22 1043   Resp 16 06/06/22 1015   SpO2 96 % 06/06/22 1043   Vitals shown include unvalidated device data.

## 2022-06-13 ENCOUNTER — ANESTHESIA EVENT (OUTPATIENT)
Dept: SURGERY | Age: 37
DRG: 660 | End: 2022-06-13
Payer: MEDICARE

## 2022-06-14 ENCOUNTER — HOSPITAL ENCOUNTER (INPATIENT)
Age: 37
LOS: 2 days | Discharge: HOME | DRG: 660 | End: 2022-06-16
Attending: UROLOGY | Admitting: UROLOGY
Payer: MEDICARE

## 2022-06-14 ENCOUNTER — ANESTHESIA (OUTPATIENT)
Dept: SURGERY | Age: 37
DRG: 660 | End: 2022-06-14
Payer: MEDICARE

## 2022-06-14 DIAGNOSIS — N13.6 PYONEPHROSIS: Primary | ICD-10-CM

## 2022-06-14 LAB
ANION GAP SERPL CALC-SCNC: 8 MMOL/L (ref 5–15)
BUN SERPL-MCNC: 32 MG/DL (ref 6–20)
BUN/CREAT SERPL: 3 (ref 12–20)
CALCIUM SERPL-MCNC: 7.8 MG/DL (ref 8.5–10.1)
CHLORIDE SERPL-SCNC: 93 MMOL/L (ref 97–108)
CO2 SERPL-SCNC: 35 MMOL/L (ref 21–32)
CREAT SERPL-MCNC: 10.5 MG/DL (ref 0.7–1.3)
ERYTHROCYTE [DISTWIDTH] IN BLOOD BY AUTOMATED COUNT: 17.1 % (ref 11.5–14.5)
GLUCOSE SERPL-MCNC: 111 MG/DL (ref 65–100)
HCT VFR BLD AUTO: 32.9 % (ref 36.6–50.3)
HGB BLD-MCNC: 10.2 G/DL (ref 12.1–17)
MCH RBC QN AUTO: 30.2 PG (ref 26–34)
MCHC RBC AUTO-ENTMCNC: 31 G/DL (ref 30–36.5)
MCV RBC AUTO: 97.3 FL (ref 80–99)
NRBC # BLD: 0 K/UL (ref 0–0.01)
NRBC BLD-RTO: 0 PER 100 WBC
PLATELET # BLD AUTO: 236 K/UL (ref 150–400)
PMV BLD AUTO: 9.7 FL (ref 8.9–12.9)
POTASSIUM SERPL-SCNC: 4 MMOL/L (ref 3.5–5.1)
RBC # BLD AUTO: 3.38 M/UL (ref 4.1–5.7)
SODIUM SERPL-SCNC: 136 MMOL/L (ref 136–145)
WBC # BLD AUTO: 13 K/UL (ref 4.1–11.1)

## 2022-06-14 PROCEDURE — 77030008684 HC TU ET CUF COVD -B: Performed by: ANESTHESIOLOGY

## 2022-06-14 PROCEDURE — 77030040361 HC SLV COMPR DVT MDII -B

## 2022-06-14 PROCEDURE — 74011000250 HC RX REV CODE- 250: Performed by: UROLOGY

## 2022-06-14 PROCEDURE — 77030006984: Performed by: UROLOGY

## 2022-06-14 PROCEDURE — 76060000038 HC ANESTHESIA 3.5 TO 4 HR: Performed by: UROLOGY

## 2022-06-14 PROCEDURE — 88307 TISSUE EXAM BY PATHOLOGIST: CPT

## 2022-06-14 PROCEDURE — 77030013533 HC SEAL FBRN TISSL RDYTUSE 10ML BAXT -E: Performed by: UROLOGY

## 2022-06-14 PROCEDURE — 77030009967 HC RELD STPLR ENDOSC J&J -C: Performed by: UROLOGY

## 2022-06-14 PROCEDURE — 76210000016 HC OR PH I REC 1 TO 1.5 HR: Performed by: UROLOGY

## 2022-06-14 PROCEDURE — 2709999900 HC NON-CHARGEABLE SUPPLY: Performed by: UROLOGY

## 2022-06-14 PROCEDURE — 77030002916 HC SUT ETHLN J&J -A: Performed by: UROLOGY

## 2022-06-14 PROCEDURE — 77030008771 HC TU NG SALEM SUMP -A: Performed by: ANESTHESIOLOGY

## 2022-06-14 PROCEDURE — 36415 COLL VENOUS BLD VENIPUNCTURE: CPT

## 2022-06-14 PROCEDURE — 77030031139 HC SUT VCRL2 J&J -A: Performed by: UROLOGY

## 2022-06-14 PROCEDURE — 77030013079 HC BLNKT BAIR HGGR 3M -A: Performed by: ANESTHESIOLOGY

## 2022-06-14 PROCEDURE — 77030005513 HC CATH URETH FOL11 MDII -B: Performed by: UROLOGY

## 2022-06-14 PROCEDURE — 77030016151 HC PROTCTR LNS DFOG COVD -B: Performed by: UROLOGY

## 2022-06-14 PROCEDURE — 77030008462 HC STPLR SKN PROX J&J -A: Performed by: UROLOGY

## 2022-06-14 PROCEDURE — 65270000029 HC RM PRIVATE

## 2022-06-14 PROCEDURE — 74011250636 HC RX REV CODE- 250/636: Performed by: UROLOGY

## 2022-06-14 PROCEDURE — 74011000258 HC RX REV CODE- 258: Performed by: NURSE ANESTHETIST, CERTIFIED REGISTERED

## 2022-06-14 PROCEDURE — 77030011264 HC ELECTRD BLD EXT COVD -A: Performed by: UROLOGY

## 2022-06-14 PROCEDURE — 74011000250 HC RX REV CODE- 250: Performed by: NURSE ANESTHETIST, CERTIFIED REGISTERED

## 2022-06-14 PROCEDURE — 74011250636 HC RX REV CODE- 250/636: Performed by: NURSE ANESTHETIST, CERTIFIED REGISTERED

## 2022-06-14 PROCEDURE — 77030002966 HC SUT PDS J&J -A: Performed by: UROLOGY

## 2022-06-14 PROCEDURE — 77030038552 HC DRN WND MDII -A: Performed by: UROLOGY

## 2022-06-14 PROCEDURE — 77030011810 HC STPLR ENDOSC J&J -G: Performed by: UROLOGY

## 2022-06-14 PROCEDURE — 77030018390 HC SPNG HEMSTAT2 J&J -B: Performed by: UROLOGY

## 2022-06-14 PROCEDURE — 74011250636 HC RX REV CODE- 250/636: Performed by: ANESTHESIOLOGY

## 2022-06-14 PROCEDURE — 77030040922 HC BLNKT HYPOTHRM STRY -A

## 2022-06-14 PROCEDURE — 77030019908 HC STETH ESOPH SIMS -A: Performed by: ANESTHESIOLOGY

## 2022-06-14 PROCEDURE — 77030010935 HC CLP LIG ABSRB TELE -B: Performed by: UROLOGY

## 2022-06-14 PROCEDURE — 76010000174 HC OR TIME 3.5 TO 4 HR INTENSV-TIER 1: Performed by: UROLOGY

## 2022-06-14 PROCEDURE — 77030011278 HC ELECTRD LIG IMPT COVD -F: Performed by: UROLOGY

## 2022-06-14 PROCEDURE — 85027 COMPLETE CBC AUTOMATED: CPT

## 2022-06-14 PROCEDURE — 77030009527 HC GEL PRT SYS AMR -E: Performed by: UROLOGY

## 2022-06-14 PROCEDURE — 74011250637 HC RX REV CODE- 250/637: Performed by: UROLOGY

## 2022-06-14 PROCEDURE — 77030026438 HC STYL ET INTUB CARD -A: Performed by: ANESTHESIOLOGY

## 2022-06-14 PROCEDURE — 80048 BASIC METABOLIC PNL TOTAL CA: CPT

## 2022-06-14 PROCEDURE — 77030002996 HC SUT SLK J&J -A: Performed by: UROLOGY

## 2022-06-14 PROCEDURE — 77030028271 HC SRGFL HEMSTAT MTRX KT J&J -C: Performed by: UROLOGY

## 2022-06-14 PROCEDURE — 77030010512 HC APPL CLP LIG J&J -C: Performed by: UROLOGY

## 2022-06-14 PROCEDURE — 77030014008 HC SPNG HEMSTAT J&J -C: Performed by: UROLOGY

## 2022-06-14 PROCEDURE — 77030002888 HC SUT CHRMC J&J -A: Performed by: UROLOGY

## 2022-06-14 PROCEDURE — 94761 N-INVAS EAR/PLS OXIMETRY MLT: CPT

## 2022-06-14 RX ORDER — MIDAZOLAM HYDROCHLORIDE 1 MG/ML
INJECTION, SOLUTION INTRAMUSCULAR; INTRAVENOUS AS NEEDED
Status: DISCONTINUED | OUTPATIENT
Start: 2022-06-14 | End: 2022-06-14 | Stop reason: HOSPADM

## 2022-06-14 RX ORDER — SODIUM CHLORIDE 0.9 % (FLUSH) 0.9 %
5-40 SYRINGE (ML) INJECTION EVERY 8 HOURS
Status: DISCONTINUED | OUTPATIENT
Start: 2022-06-14 | End: 2022-06-16 | Stop reason: HOSPADM

## 2022-06-14 RX ORDER — ACETAMINOPHEN 500 MG
1000 TABLET ORAL EVERY 6 HOURS
Status: DISCONTINUED | OUTPATIENT
Start: 2022-06-14 | End: 2022-06-16 | Stop reason: HOSPADM

## 2022-06-14 RX ORDER — FENTANYL CITRATE 50 UG/ML
INJECTION, SOLUTION INTRAMUSCULAR; INTRAVENOUS AS NEEDED
Status: DISCONTINUED | OUTPATIENT
Start: 2022-06-14 | End: 2022-06-14 | Stop reason: HOSPADM

## 2022-06-14 RX ORDER — SODIUM CHLORIDE 9 MG/ML
INJECTION, SOLUTION INTRAVENOUS
Status: DISCONTINUED | OUTPATIENT
Start: 2022-06-14 | End: 2022-06-14 | Stop reason: HOSPADM

## 2022-06-14 RX ORDER — SODIUM CHLORIDE, SODIUM LACTATE, POTASSIUM CHLORIDE, CALCIUM CHLORIDE 600; 310; 30; 20 MG/100ML; MG/100ML; MG/100ML; MG/100ML
100 INJECTION, SOLUTION INTRAVENOUS CONTINUOUS
Status: DISCONTINUED | OUTPATIENT
Start: 2022-06-14 | End: 2022-06-14 | Stop reason: HOSPADM

## 2022-06-14 RX ORDER — SODIUM CHLORIDE, SODIUM LACTATE, POTASSIUM CHLORIDE, CALCIUM CHLORIDE 600; 310; 30; 20 MG/100ML; MG/100ML; MG/100ML; MG/100ML
100 INJECTION, SOLUTION INTRAVENOUS CONTINUOUS
Status: DISCONTINUED | OUTPATIENT
Start: 2022-06-14 | End: 2022-06-16 | Stop reason: HOSPADM

## 2022-06-14 RX ORDER — OXYCODONE HYDROCHLORIDE 5 MG/1
5 TABLET ORAL
Qty: 18 TABLET | Refills: 0 | Status: SHIPPED | OUTPATIENT
Start: 2022-06-14 | End: 2022-06-17

## 2022-06-14 RX ORDER — DEXAMETHASONE SODIUM PHOSPHATE 4 MG/ML
INJECTION, SOLUTION INTRA-ARTICULAR; INTRALESIONAL; INTRAMUSCULAR; INTRAVENOUS; SOFT TISSUE AS NEEDED
Status: DISCONTINUED | OUTPATIENT
Start: 2022-06-14 | End: 2022-06-14 | Stop reason: HOSPADM

## 2022-06-14 RX ORDER — DIPHENHYDRAMINE HYDROCHLORIDE 50 MG/ML
12.5 INJECTION, SOLUTION INTRAMUSCULAR; INTRAVENOUS AS NEEDED
Status: DISCONTINUED | OUTPATIENT
Start: 2022-06-14 | End: 2022-06-14 | Stop reason: HOSPADM

## 2022-06-14 RX ORDER — SODIUM CHLORIDE 0.9 % (FLUSH) 0.9 %
5-40 SYRINGE (ML) INJECTION AS NEEDED
Status: DISCONTINUED | OUTPATIENT
Start: 2022-06-14 | End: 2022-06-16 | Stop reason: HOSPADM

## 2022-06-14 RX ORDER — SODIUM CHLORIDE 0.9 % (FLUSH) 0.9 %
5-40 SYRINGE (ML) INJECTION EVERY 8 HOURS
Status: DISCONTINUED | OUTPATIENT
Start: 2022-06-14 | End: 2022-06-14 | Stop reason: HOSPADM

## 2022-06-14 RX ORDER — LIDOCAINE HYDROCHLORIDE 20 MG/ML
INJECTION, SOLUTION EPIDURAL; INFILTRATION; INTRACAUDAL; PERINEURAL AS NEEDED
Status: DISCONTINUED | OUTPATIENT
Start: 2022-06-14 | End: 2022-06-14 | Stop reason: HOSPADM

## 2022-06-14 RX ORDER — GLYCOPYRROLATE 0.2 MG/ML
INJECTION INTRAMUSCULAR; INTRAVENOUS AS NEEDED
Status: DISCONTINUED | OUTPATIENT
Start: 2022-06-14 | End: 2022-06-14 | Stop reason: HOSPADM

## 2022-06-14 RX ORDER — LIDOCAINE 4 G/100G
1 PATCH TOPICAL EVERY 24 HOURS
Status: DISCONTINUED | OUTPATIENT
Start: 2022-06-14 | End: 2022-06-16 | Stop reason: HOSPADM

## 2022-06-14 RX ORDER — OXYCODONE HYDROCHLORIDE 5 MG/1
5 TABLET ORAL AS NEEDED
Status: DISCONTINUED | OUTPATIENT
Start: 2022-06-14 | End: 2022-06-14 | Stop reason: HOSPADM

## 2022-06-14 RX ORDER — FLUMAZENIL 0.1 MG/ML
0.2 INJECTION INTRAVENOUS
Status: DISCONTINUED | OUTPATIENT
Start: 2022-06-14 | End: 2022-06-14 | Stop reason: HOSPADM

## 2022-06-14 RX ORDER — LABETALOL HYDROCHLORIDE 5 MG/ML
INJECTION, SOLUTION INTRAVENOUS AS NEEDED
Status: DISCONTINUED | OUTPATIENT
Start: 2022-06-14 | End: 2022-06-14 | Stop reason: HOSPADM

## 2022-06-14 RX ORDER — CISATRACURIUM BESYLATE 2 MG/ML
INJECTION, SOLUTION INTRAVENOUS AS NEEDED
Status: DISCONTINUED | OUTPATIENT
Start: 2022-06-14 | End: 2022-06-14 | Stop reason: HOSPADM

## 2022-06-14 RX ORDER — SODIUM CHLORIDE 0.9 % (FLUSH) 0.9 %
5-40 SYRINGE (ML) INJECTION AS NEEDED
Status: DISCONTINUED | OUTPATIENT
Start: 2022-06-14 | End: 2022-06-14 | Stop reason: HOSPADM

## 2022-06-14 RX ORDER — SODIUM CHLORIDE, SODIUM LACTATE, POTASSIUM CHLORIDE, CALCIUM CHLORIDE 600; 310; 30; 20 MG/100ML; MG/100ML; MG/100ML; MG/100ML
125 INJECTION, SOLUTION INTRAVENOUS CONTINUOUS
Status: DISCONTINUED | OUTPATIENT
Start: 2022-06-14 | End: 2022-06-14 | Stop reason: HOSPADM

## 2022-06-14 RX ORDER — LIDOCAINE HYDROCHLORIDE 10 MG/ML
0.1 INJECTION, SOLUTION EPIDURAL; INFILTRATION; INTRACAUDAL; PERINEURAL AS NEEDED
Status: DISCONTINUED | OUTPATIENT
Start: 2022-06-14 | End: 2022-06-14 | Stop reason: HOSPADM

## 2022-06-14 RX ORDER — HYDROMORPHONE HYDROCHLORIDE 1 MG/ML
.25-1 INJECTION, SOLUTION INTRAMUSCULAR; INTRAVENOUS; SUBCUTANEOUS
Status: DISCONTINUED | OUTPATIENT
Start: 2022-06-14 | End: 2022-06-14 | Stop reason: HOSPADM

## 2022-06-14 RX ORDER — ONDANSETRON 2 MG/ML
4 INJECTION INTRAMUSCULAR; INTRAVENOUS
Status: DISCONTINUED | OUTPATIENT
Start: 2022-06-14 | End: 2022-06-16 | Stop reason: HOSPADM

## 2022-06-14 RX ORDER — AMOXICILLIN 250 MG
1 CAPSULE ORAL 2 TIMES DAILY
Status: DISCONTINUED | OUTPATIENT
Start: 2022-06-14 | End: 2022-06-16 | Stop reason: HOSPADM

## 2022-06-14 RX ORDER — HYDROMORPHONE HYDROCHLORIDE 2 MG/ML
INJECTION, SOLUTION INTRAMUSCULAR; INTRAVENOUS; SUBCUTANEOUS AS NEEDED
Status: DISCONTINUED | OUTPATIENT
Start: 2022-06-14 | End: 2022-06-14 | Stop reason: HOSPADM

## 2022-06-14 RX ORDER — MORPHINE SULFATE 2 MG/ML
2 INJECTION, SOLUTION INTRAMUSCULAR; INTRAVENOUS
Status: DISCONTINUED | OUTPATIENT
Start: 2022-06-14 | End: 2022-06-16 | Stop reason: HOSPADM

## 2022-06-14 RX ORDER — PROMETHAZINE HYDROCHLORIDE 25 MG/1
12.5 TABLET ORAL
Status: DISCONTINUED | OUTPATIENT
Start: 2022-06-14 | End: 2022-06-16 | Stop reason: HOSPADM

## 2022-06-14 RX ORDER — OXYCODONE HYDROCHLORIDE 5 MG/1
5 TABLET ORAL
Status: DISCONTINUED | OUTPATIENT
Start: 2022-06-14 | End: 2022-06-16 | Stop reason: HOSPADM

## 2022-06-14 RX ORDER — NEOSTIGMINE METHYLSULFATE 1 MG/ML
INJECTION, SOLUTION INTRAVENOUS AS NEEDED
Status: DISCONTINUED | OUTPATIENT
Start: 2022-06-14 | End: 2022-06-14 | Stop reason: HOSPADM

## 2022-06-14 RX ORDER — ONDANSETRON 2 MG/ML
4 INJECTION INTRAMUSCULAR; INTRAVENOUS AS NEEDED
Status: DISCONTINUED | OUTPATIENT
Start: 2022-06-14 | End: 2022-06-14 | Stop reason: HOSPADM

## 2022-06-14 RX ORDER — NALOXONE HYDROCHLORIDE 0.4 MG/ML
0.2 INJECTION, SOLUTION INTRAMUSCULAR; INTRAVENOUS; SUBCUTANEOUS
Status: DISCONTINUED | OUTPATIENT
Start: 2022-06-14 | End: 2022-06-14 | Stop reason: HOSPADM

## 2022-06-14 RX ORDER — PROPOFOL 10 MG/ML
INJECTION, EMULSION INTRAVENOUS AS NEEDED
Status: DISCONTINUED | OUTPATIENT
Start: 2022-06-14 | End: 2022-06-14 | Stop reason: HOSPADM

## 2022-06-14 RX ADMIN — FENTANYL CITRATE 50 MCG: 50 INJECTION, SOLUTION INTRAMUSCULAR; INTRAVENOUS at 14:39

## 2022-06-14 RX ADMIN — PHENYLEPHRINE HYDROCHLORIDE 80 MCG: 10 INJECTION INTRAVENOUS at 14:04

## 2022-06-14 RX ADMIN — CEFAZOLIN SODIUM 2 G: 1 POWDER, FOR SOLUTION INTRAMUSCULAR; INTRAVENOUS at 14:05

## 2022-06-14 RX ADMIN — MORPHINE SULFATE 2 MG: 2 INJECTION, SOLUTION INTRAMUSCULAR; INTRAVENOUS at 23:39

## 2022-06-14 RX ADMIN — HYDROMORPHONE HYDROCHLORIDE 0.5 MG: 1 INJECTION, SOLUTION INTRAMUSCULAR; INTRAVENOUS; SUBCUTANEOUS at 18:06

## 2022-06-14 RX ADMIN — FENTANYL CITRATE 50 MCG: 50 INJECTION, SOLUTION INTRAMUSCULAR; INTRAVENOUS at 13:48

## 2022-06-14 RX ADMIN — PROPOFOL 120 MG: 10 INJECTION, EMULSION INTRAVENOUS at 13:51

## 2022-06-14 RX ADMIN — HYDROMORPHONE HYDROCHLORIDE 1 MG: 1 INJECTION, SOLUTION INTRAMUSCULAR; INTRAVENOUS; SUBCUTANEOUS at 18:26

## 2022-06-14 RX ADMIN — HYDROMORPHONE HYDROCHLORIDE 1 MG: 2 INJECTION INTRAMUSCULAR; INTRAVENOUS; SUBCUTANEOUS at 15:32

## 2022-06-14 RX ADMIN — SODIUM CHLORIDE, POTASSIUM CHLORIDE, SODIUM LACTATE AND CALCIUM CHLORIDE 100 ML/HR: 600; 310; 30; 20 INJECTION, SOLUTION INTRAVENOUS at 20:47

## 2022-06-14 RX ADMIN — ACETAMINOPHEN 1000 MG: 500 TABLET ORAL at 19:27

## 2022-06-14 RX ADMIN — CISATRACURIUM BESYLATE 4 MG: 2 INJECTION, SOLUTION INTRAVENOUS at 15:01

## 2022-06-14 RX ADMIN — DEXAMETHASONE SODIUM PHOSPHATE 4 MG: 4 INJECTION, SOLUTION INTRAMUSCULAR; INTRAVENOUS at 14:15

## 2022-06-14 RX ADMIN — LIDOCAINE HYDROCHLORIDE 60 MG: 20 INJECTION, SOLUTION EPIDURAL; INFILTRATION; INTRACAUDAL; PERINEURAL at 13:51

## 2022-06-14 RX ADMIN — CISATRACURIUM BESYLATE 10 MG: 2 INJECTION, SOLUTION INTRAVENOUS at 13:51

## 2022-06-14 RX ADMIN — MORPHINE SULFATE 2 MG: 2 INJECTION, SOLUTION INTRAMUSCULAR; INTRAVENOUS at 20:30

## 2022-06-14 RX ADMIN — PROPOFOL 30 MG: 10 INJECTION, EMULSION INTRAVENOUS at 17:28

## 2022-06-14 RX ADMIN — FENTANYL CITRATE 50 MCG: 50 INJECTION, SOLUTION INTRAMUSCULAR; INTRAVENOUS at 13:44

## 2022-06-14 RX ADMIN — HYDROMORPHONE HYDROCHLORIDE 0.5 MG: 1 INJECTION, SOLUTION INTRAMUSCULAR; INTRAVENOUS; SUBCUTANEOUS at 18:18

## 2022-06-14 RX ADMIN — DOCUSATE SODIUM 50MG AND SENNOSIDES 8.6MG 1 TABLET: 8.6; 5 TABLET, FILM COATED ORAL at 20:40

## 2022-06-14 RX ADMIN — OXYCODONE 5 MG: 5 TABLET ORAL at 23:09

## 2022-06-14 RX ADMIN — FENTANYL CITRATE 50 MCG: 50 INJECTION, SOLUTION INTRAMUSCULAR; INTRAVENOUS at 14:42

## 2022-06-14 RX ADMIN — MIDAZOLAM HYDROCHLORIDE 3 MG: 1 INJECTION, SOLUTION INTRAMUSCULAR; INTRAVENOUS at 13:43

## 2022-06-14 RX ADMIN — OXYCODONE 5 MG: 5 TABLET ORAL at 19:27

## 2022-06-14 RX ADMIN — LABETALOL HYDROCHLORIDE 2.5 MG: 5 INJECTION INTRAVENOUS at 16:11

## 2022-06-14 RX ADMIN — Medication 2.5 MG: at 17:04

## 2022-06-14 RX ADMIN — ONDANSETRON 4 MG: 2 INJECTION INTRAMUSCULAR; INTRAVENOUS at 18:13

## 2022-06-14 RX ADMIN — LABETALOL HYDROCHLORIDE 2.5 MG: 5 INJECTION INTRAVENOUS at 16:47

## 2022-06-14 RX ADMIN — CISATRACURIUM BESYLATE 6 MG: 2 INJECTION, SOLUTION INTRAVENOUS at 14:32

## 2022-06-14 RX ADMIN — PROPOFOL 50 MG: 10 INJECTION, EMULSION INTRAVENOUS at 13:55

## 2022-06-14 RX ADMIN — PHENYLEPHRINE HYDROCHLORIDE 40 MCG: 10 INJECTION INTRAVENOUS at 14:00

## 2022-06-14 RX ADMIN — FENTANYL CITRATE 50 MCG: 50 INJECTION, SOLUTION INTRAMUSCULAR; INTRAVENOUS at 14:59

## 2022-06-14 RX ADMIN — SODIUM CHLORIDE, POTASSIUM CHLORIDE, SODIUM LACTATE AND CALCIUM CHLORIDE 100 ML/HR: 600; 310; 30; 20 INJECTION, SOLUTION INTRAVENOUS at 19:17

## 2022-06-14 RX ADMIN — GLYCOPYRROLATE 0.4 MG: 0.2 INJECTION INTRAMUSCULAR; INTRAVENOUS at 17:04

## 2022-06-14 RX ADMIN — SODIUM CHLORIDE: 9 INJECTION, SOLUTION INTRAVENOUS at 13:35

## 2022-06-14 RX ADMIN — CISATRACURIUM BESYLATE 4 MG: 2 INJECTION, SOLUTION INTRAVENOUS at 16:07

## 2022-06-14 RX ADMIN — SODIUM CHLORIDE, PRESERVATIVE FREE 10 ML: 5 INJECTION INTRAVENOUS at 22:00

## 2022-06-14 RX ADMIN — ONDANSETRON 4 MG: 2 INJECTION INTRAMUSCULAR; INTRAVENOUS at 23:17

## 2022-06-14 RX ADMIN — CISATRACURIUM BESYLATE 2 MG: 2 INJECTION, SOLUTION INTRAVENOUS at 14:59

## 2022-06-14 NOTE — BRIEF OP NOTE
Brief Postoperative Note    Patient: Ubaldo Pleitez  YOB: 1985  MRN: [id number]    Date of Procedure: 6/14/2022     Pre-Op Diagnosis: RENAL INSUFFICENCY, infected R kidney    Post-Op Diagnosis: Same as preoperative diagnosis. abd ahesions  Procedure(s):  TOTAL RIGHT  LAPAROSCOPIC NEPHRECTOMY WITH LYSIS OF ADHESIONS    Surgeon(s):  MD Christian Lloyd MD Lynelle Fess, MD Lynelle Fess, MD    Surgical Assistant: Surg Asst-1: Noe Sherwood    Anesthesia: General     Estimated Blood Loss (mL): less than 261     Complications: Other: liver lac    Specimens:   ID Type Source Tests Collected by Time Destination   1 : Right Kidney Preservative Kidney, Right  Suzanne Hu MD 6/14/2022 1631 Pathology        Implants: * No implants in log *    Drains:   Jesus-Gutierrez Drain 06/14/22 Right; Lower Abdomen (Active)       Findings: copious adhesions    Electronically Signed by Christie Woods MD on 6/14/2022 at 5:10 PM

## 2022-06-14 NOTE — DISCHARGE INSTRUCTIONS
Dr. Jadyn Osorio. [facility]    POST OPERATIVE INSTRUCTIONS    FOLLOW-UP VISIT    ? Dr. Chari Regalado or his associate will see you back in the office in 1-2 weeks. ? At that time your final pathology report will be reviewed with you. DISCHARGE MEDICATIONS    ? Upon discharge you will be given a prescription for pain control (Percocet)  ? Most patients experience only minimal discomfort after this minimally invasive surgery. We recommend using Tylenol (acetaminophen) for pain first and reserve the narcotic pain medication as an alternative as it tends to cause constipation. ? You may resume your normal medications upon discharge from the hospital with the exception of any blood-thinning products (such as coumadin, plavix, Xeralto, aspirin, etc). You may resume blood-thinning medications in 1 week unless otherwise instructed by the prescribing physician. ACTIVITY    ? Please refrain from driving for the 1st week after your surgery. ? You may shower upon discharge from the hospital. Avoid bathtubs, hot tubs or swimming pools during the 1st 2 weeks. ? It is important to be mobile during your recovery period. Avoid sitting in one position for longer than 45 minutes to 1 hour. Walking and climbing stairs are OK. Be careful not to overdo it. ? Avoid any heavy lifting or strenuous activity for 4-6 weeks. ? Most patients ease into normal activities (including employment) after 2 weeks of recuperation. ? Most patients resume driving by the 2nd week. Please use your best judgment. CARE OF YOUR INCISION SITES    ? Application of ointments or creams to the incision sites is not recommended. ? The adhesive clear wound dressing will slough off naturally in 5 - 10 days. ? Do not pick at or apply ointments/medications to the adhesive dressing. ? Do not scrub, soak or expose incisions to prolonged moisture. DIET    ?  Please limit carbonated beverages, dairy products and any other gas producing foods (such as flour, beans, or broccoli) for the 1st week or so. Small meals are best until bowel habits return to normal.        THINGS YOU MAY ENCOUNTER AFTER SURGERY    ? Bruising around incision sites. Not at all uncommon and should not alarm you. This will resolve with time. ? Abdominal distention, constipation or bloating. Make sure you are following the dietary instructions. If you dont have a bowel movement or pass gas or are feeling uncomfortable 24 hours after surgery, try taking Milk of Magnesia as directed on the bottle. If after two doses of Milk of Magnesia, you still have not had a bowel movement, it is safe to use a Dulcolax suppository (available over the counter at your local pharmacy). ? Males may have Scrotal/Penile swelling and bruising. This is quite common and should not alarm you. It may appear immediately after surgery or may start 4 -5 days after surgery. It should resolve in about 7 - 14 days. You may try elevating your scrotum with a small towel or washcloth when lying down. ? Lower legs/ankle swelling. This is not abnormal when it occurs in both legs and should not alarm you. It should resolve in about 7 - 14 days. Elevation of your legs while sitting will help. CONTACT MD IMMEDIATELY IF YOU ARE EXPERIENCING ANY OF THE FOLLOWING SYMPTOMS:    ? Oral Temperature over 101° F.  ? Unable to urinate. ? Any pain not relieved by pain medication prescribed. ? Persistent nausea/vomiting. ? Uneven swelling of legs or ankles. ? Redness and/or large amount of swelling around your incision sites. ? Excessive bleeding or drainage from your incision sites.         [address]  [phone]

## 2022-06-14 NOTE — ANESTHESIA POSTPROCEDURE EVALUATION
Procedure(s):  TOTAL RIGHT  LAPAROSCOPIC NEPHRECTOMY WITH LYSIS OF ADHESIONS. general    Anesthesia Post Evaluation        Patient location during evaluation: PACU  Patient participation: complete - patient participated  Level of consciousness: awake  Pain management: adequate  Airway patency: patent  Anesthetic complications: no  Cardiovascular status: acceptable and stable  Respiratory status: acceptable and unassisted  Hydration status: acceptable  Comments: The patient was seen and evaluated in the post-operative period. The time of my evaluation may not match the time of this note. The patient denied uncontrolled pain or nausea, and there were no significant complications evident. Petra Atwood MD      Post anesthesia nausea and vomiting:  none  Final Post Anesthesia Temperature Assessment:  Normothermia (36.0-37.5 degrees C)      INITIAL Post-op Vital signs:   Vitals Value Taken Time   /69 06/14/22 1855   Temp 37.1 °C (98.8 °F) 06/14/22 1746   Pulse 80 06/14/22 1859   Resp 14 06/14/22 1859   SpO2 97 % 06/14/22 1859   Vitals shown include unvalidated device data.

## 2022-06-14 NOTE — ANESTHESIA PREPROCEDURE EVALUATION
Relevant Problems   No relevant active problems       Anesthetic History   No history of anesthetic complications            Review of Systems / Medical History  Patient summary reviewed and pertinent labs reviewed    Pulmonary  Within defined limits                 Neuro/Psych   Within defined limits           Cardiovascular                  Exercise tolerance: >4 METS  Comments: A-V Fistula / Graft Right upper limb, for dialysis access. Hemo- Dialysis Monday, Wednesday and Friday. 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  Endo/Other        Anemia     Other Findings            Physical Exam    Airway  Mallampati: II  TM Distance: 4 - 6 cm  Neck ROM: normal range of motion   Mouth opening: Normal     Cardiovascular    Rhythm: regular  Rate: normal         Dental  No notable dental hx       Pulmonary  Breath sounds clear to auscultation               Abdominal  GI exam deferred       Other Findings            Anesthetic Plan    ASA: 3  Anesthesia type: general          Induction: Intravenous  Anesthetic plan and risks discussed with: Patient

## 2022-06-14 NOTE — INTERVAL H&P NOTE
Update History & Physical    The Patient's History and Physical of June 6, 2022 was reviewed with the patient and I examined the patient. There was no change. The surgical site was confirmed by the patient and me. Plan:  The risk, benefits, expected outcome, and alternative to the recommended procedure have been discussed with the patient. Patient understands and wants to proceed with the procedure. Pt had cysto anf rpgs as per last visit. Plan R nephrectomy as discussed.     Electronically signed by Vonda Canela MD on 6/14/2022 at 1:24 PM

## 2022-06-14 NOTE — PERIOP NOTES
TRANSFER - OUT REPORT:    Verbal report given to JACOB Lanier (name) on Dony Mcdonald  being transferred to [address] [place] [address] (unit) for routine progression of care       Report consisted of patients Situation, Background, Assessment and   Recommendations(SBAR). Information from the following report(s) SBAR, OR Summary, Intake/Output and Recent Results was reviewed with the receiving nurse. Lines:   Peripheral IV 06/14/22 Anterior; Left Forearm (Active)   Site Assessment Clean, dry, & intact 06/14/22 1744   Phlebitis Assessment 0 06/14/22 1744   Infiltration Assessment 0 06/14/22 1744   Dressing Status Clean, dry, & intact 06/14/22 1744   Dressing Type Tape;Transparent 06/14/22 1744   Hub Color/Line Status Pink; Infusing;Flushed;Patent 06/14/22 1744   Action Taken Open ports on tubing capped 06/14/22 1744   Alcohol Cap Used Yes 06/14/22 1744       Peripheral IV 06/14/22 Left External jugular (Active)   Site Assessment Clean, dry, & intact 06/14/22 1744   Phlebitis Assessment 0 06/14/22 1744   Infiltration Assessment 0 06/14/22 1744   Dressing Status Clean, dry, & intact 06/14/22 1744   Dressing Type Tape;Transparent 06/14/22 1744   Hub Color/Line Status Pink; Infusing;Flushed;Patent 06/14/22 1744   Action Taken Open ports on tubing capped 06/14/22 1744   Alcohol Cap Used Yes 06/14/22 1744        Opportunity for questions and clarification was provided.       Patient transported with:   Registered Nurse

## 2022-06-15 LAB
ALBUMIN SERPL-MCNC: 2.2 G/DL (ref 3.5–5)
ALBUMIN/GLOB SERPL: 0.4 {RATIO} (ref 1.1–2.2)
ALP SERPL-CCNC: 91 U/L (ref 45–117)
ALT SERPL-CCNC: 13 U/L (ref 12–78)
ANION GAP SERPL CALC-SCNC: 9 MMOL/L (ref 5–15)
AST SERPL-CCNC: ABNORMAL U/L (ref 15–37)
BILIRUB SERPL-MCNC: 0.7 MG/DL (ref 0.2–1)
BUN SERPL-MCNC: 40 MG/DL (ref 6–20)
BUN/CREAT SERPL: 3 (ref 12–20)
CALCIUM SERPL-MCNC: 7.4 MG/DL (ref 8.5–10.1)
CHLORIDE SERPL-SCNC: 95 MMOL/L (ref 97–108)
CO2 SERPL-SCNC: 30 MMOL/L (ref 21–32)
CREAT SERPL-MCNC: 11.5 MG/DL (ref 0.7–1.3)
ERYTHROCYTE [DISTWIDTH] IN BLOOD BY AUTOMATED COUNT: 17.3 % (ref 11.5–14.5)
GLOBULIN SER CALC-MCNC: 5.6 G/DL (ref 2–4)
GLUCOSE SERPL-MCNC: 105 MG/DL (ref 65–100)
HBV SURFACE AB SER QL: REACTIVE
HBV SURFACE AB SER-ACNC: >1000 MIU/ML
HBV SURFACE AG SER QL: <0.1 INDEX
HBV SURFACE AG SER QL: NEGATIVE
HCT VFR BLD AUTO: 32.3 % (ref 36.6–50.3)
HGB BLD-MCNC: 10 G/DL (ref 12.1–17)
MCH RBC QN AUTO: 30 PG (ref 26–34)
MCHC RBC AUTO-ENTMCNC: 31 G/DL (ref 30–36.5)
MCV RBC AUTO: 97 FL (ref 80–99)
NRBC # BLD: 0 K/UL (ref 0–0.01)
NRBC BLD-RTO: 0 PER 100 WBC
PLATELET # BLD AUTO: 296 K/UL (ref 150–400)
PMV BLD AUTO: 10.5 FL (ref 8.9–12.9)
POTASSIUM SERPL-SCNC: ABNORMAL MMOL/L (ref 3.5–5.1)
PROT SERPL-MCNC: 7.8 G/DL (ref 6.4–8.2)
RBC # BLD AUTO: 3.33 M/UL (ref 4.1–5.7)
SODIUM SERPL-SCNC: 134 MMOL/L (ref 136–145)
WBC # BLD AUTO: 14.7 K/UL (ref 4.1–11.1)

## 2022-06-15 PROCEDURE — 80053 COMPREHEN METABOLIC PANEL: CPT

## 2022-06-15 PROCEDURE — 90935 HEMODIALYSIS ONE EVALUATION: CPT

## 2022-06-15 PROCEDURE — 5A1D70Z PERFORMANCE OF URINARY FILTRATION, INTERMITTENT, LESS THAN 6 HOURS PER DAY: ICD-10-PCS | Performed by: UROLOGY

## 2022-06-15 PROCEDURE — 0TT04ZZ RESECTION OF RIGHT KIDNEY, PERCUTANEOUS ENDOSCOPIC APPROACH: ICD-10-PCS | Performed by: UROLOGY

## 2022-06-15 PROCEDURE — 77030037134 HC WRAP COMPR BACK THER SOLM -B

## 2022-06-15 PROCEDURE — 74011000250 HC RX REV CODE- 250: Performed by: UROLOGY

## 2022-06-15 PROCEDURE — 74011250636 HC RX REV CODE- 250/636: Performed by: UROLOGY

## 2022-06-15 PROCEDURE — 87340 HEPATITIS B SURFACE AG IA: CPT

## 2022-06-15 PROCEDURE — 86706 HEP B SURFACE ANTIBODY: CPT

## 2022-06-15 PROCEDURE — 65270000029 HC RM PRIVATE

## 2022-06-15 PROCEDURE — 74011250637 HC RX REV CODE- 250/637: Performed by: UROLOGY

## 2022-06-15 PROCEDURE — 36415 COLL VENOUS BLD VENIPUNCTURE: CPT

## 2022-06-15 PROCEDURE — 85027 COMPLETE CBC AUTOMATED: CPT

## 2022-06-15 RX ADMIN — SODIUM CHLORIDE, PRESERVATIVE FREE 10 ML: 5 INJECTION INTRAVENOUS at 21:31

## 2022-06-15 RX ADMIN — DOCUSATE SODIUM 50MG AND SENNOSIDES 8.6MG 1 TABLET: 8.6; 5 TABLET, FILM COATED ORAL at 21:31

## 2022-06-15 RX ADMIN — SODIUM CHLORIDE, POTASSIUM CHLORIDE, SODIUM LACTATE AND CALCIUM CHLORIDE 100 ML/HR: 600; 310; 30; 20 INJECTION, SOLUTION INTRAVENOUS at 05:37

## 2022-06-15 RX ADMIN — ACETAMINOPHEN 1000 MG: 500 TABLET ORAL at 08:48

## 2022-06-15 RX ADMIN — ONDANSETRON 4 MG: 2 INJECTION INTRAMUSCULAR; INTRAVENOUS at 16:13

## 2022-06-15 RX ADMIN — OXYCODONE 5 MG: 5 TABLET ORAL at 03:05

## 2022-06-15 RX ADMIN — SODIUM CHLORIDE, PRESERVATIVE FREE 10 ML: 5 INJECTION INTRAVENOUS at 16:13

## 2022-06-15 RX ADMIN — ACETAMINOPHEN 1000 MG: 500 TABLET ORAL at 01:53

## 2022-06-15 RX ADMIN — DOCUSATE SODIUM 50MG AND SENNOSIDES 8.6MG 1 TABLET: 8.6; 5 TABLET, FILM COATED ORAL at 08:48

## 2022-06-15 RX ADMIN — MORPHINE SULFATE 2 MG: 2 INJECTION, SOLUTION INTRAMUSCULAR; INTRAVENOUS at 05:47

## 2022-06-15 RX ADMIN — SODIUM CHLORIDE, PRESERVATIVE FREE 10 ML: 5 INJECTION INTRAVENOUS at 05:25

## 2022-06-15 RX ADMIN — CEFAZOLIN 1 G: 1 INJECTION, POWDER, FOR SOLUTION INTRAMUSCULAR; INTRAVENOUS at 13:51

## 2022-06-15 RX ADMIN — ACETAMINOPHEN 1000 MG: 500 TABLET ORAL at 19:38

## 2022-06-15 RX ADMIN — MORPHINE SULFATE 2 MG: 2 INJECTION, SOLUTION INTRAMUSCULAR; INTRAVENOUS at 21:31

## 2022-06-15 RX ADMIN — OXYCODONE 5 MG: 5 TABLET ORAL at 19:38

## 2022-06-15 RX ADMIN — OXYCODONE 5 MG: 5 TABLET ORAL at 08:48

## 2022-06-15 RX ADMIN — MORPHINE SULFATE 2 MG: 2 INJECTION, SOLUTION INTRAMUSCULAR; INTRAVENOUS at 10:17

## 2022-06-15 RX ADMIN — MORPHINE SULFATE 2 MG: 2 INJECTION, SOLUTION INTRAMUSCULAR; INTRAVENOUS at 16:14

## 2022-06-15 NOTE — PROGRESS NOTES
Doing well, eating. Still sore. Denies F/C. Abdomen appropriate. POD1 R Nephrectomy, MI  Diet as tolerated. Will check on him tomorrow.      Mian Christina MD

## 2022-06-15 NOTE — OP NOTES
[facility]  OPERATIVE REPORT    Name:  Elen Holland  MR#:  [id number]  :  1985  ACCOUNT #:  [de-identified]  DATE OF SERVICE:  2022    PREOPERATIVE DIAGNOSES:  Pyelonephrosis, renal failure. POSTOPERATIVE DIAGNOSES:  Pyelonephrosis renal failure with extensive intra-abdominal adhesions. PROCEDURE PERFORMED:  Laparoscopic hand-assisted right total nephrectomy, extensive lysis of adhesions, T22 modifier due to greater than 100% time needed for case completion. SURGEON:  Russ Madsen MD    CO-SURGEON:  Arlette Zavala MD    ASSISTANT:  none    ANESTHESIA:  General.    COMPLICATIONS:  Liver laceration. SPECIMENS REMOVED:  Right kidney. IMPLANTS:  None. ESTIMATED BLOOD LOSS:  100 mL. PROCEDURE:  After consent was obtained, the patient was taken to the operating room. After adequate anesthesia was obtained, a Bhandari catheter and orogastric tube were placed. He was then positioned lateral decubitus with the right side up. His head, neck, axillae, arms, and legs were all padded appropriately; secured with beanbag and seatbelts and prepped and draped. Area lateral and superior to the umbilicus at the lateral border of the rectus was anesthetized with Marcaine. Incision was then made and soft tissues were dissected with Bovie. Then using the visual obturator, entered the abdomen. It seemed like there were some bowel adhesions, but I was able to manipulate the port into the peritoneum which was then insufflated. Once insufflated, the adhesions moved away, realized that there were adhesions in the liver, was stuck up to the right abdominal wall and there was a liver laceration. There was a slow ooze from that. I went ahead and placed another 12-mm port in the right upper quadrant under direct vision after infiltration with the Marcaine mixture.   Once another port was in, I was able to fill the liver laceration opening with FloSeal and then packed with Surgicel. Bleeding stopped. I then looked back towards the midline. There were a lot of adhesions on the right-sided abdominal wall but it appeared like a midline and left abdominal wall were opened at the level of the umbilicus. The periumbilical area was then anesthetized with Marcaine. A midline incision was made through his old incision site for about 7.5 cm. Soft tissue was dissected with Bovie and then I got down to the clear peritoneum which I could visualize through the port and camera and then opened up the peritoneum. I was then able to extend the incision superiorly and inferiorly. Once this was done, I was able to lyse some of the adhesions where the bowel was attached to the right abdominal wall. There was a small serosal tear that was made. I continued to dissect the adhesions away until I had enough clearance where I could place the GelPort. This was done and then with my left hand in the abdomen, I inspected for initial adhesions which were noted in the right lower quadrant and these were taken down with the LigaSure device until I had the colon mobilized. I had Dr. Helena Tipton, General Surgery, come in, look at the adhesions and liver laceration. Recommended to go ahead and proceed with nephrectomy, and then he would come back in and check later. I continued to take down adhesions down the right lower quadrant in order to mobilize the colon medially. I did that to get the colon all the way off the psoas muscle and then dissect up superiorly. The hepatic flexure was densely adhesed to the liver, duodenum, and lower pole of the Gerota's fascia, and kidney. I used both blunt and sharp dissection to take down these adhesions taking care to avoid any injury to the bowel. Once I got the colon off the lower pole of the kidney, I was able to take down some of the hepatic adhesions to anterior Gerota's fascia. This was done both bluntly and sharply.   Once I got the liver mobilized off of the kidney, I placed a 5-mm port underneath the xiphoid under direct vision. I then used a locking grasper, so I was able to elevate the liver off of the kidney in order to provide better visualization. The grasper was held by holding the diaphragm. I then went back, identified the colon as it came inferior to the gallbladder, identified the duodenum, was able to mobilize the structures medially and then get them off of the kidney. I then went back to the dense lower pole fat, went to that serially in an effort to identify the ureter. I found a dense band, it was about a centimeter thick. I believe that was ureter and possibly a combination of ureter and gonadal vessels. This was dissected down to the level of the psoas but not below the level of the vessels. I attempted to clip that with Weck clips; however, it was too big and  thick for the Weck clips to hold. I elected to go ahead and take that with the 45 stapler. I then ligated the ureter with the vascular white load. This allowed me to elevate the lower pole of the kidney. I then dissected up superiorly, taking down a lot of dense perinephric attachments. I rotated the kidney medially. I identified the nephrostomy tube tract which was divided using the LigaSure device. Now that I could elevate the lower pole of the kidney better, I dissected up slowly, taking tissue 2-3 mm at a time. I got up to where I felt the hilar structures were noted. I identified the lower aspect of the artery as well as the lower aspect of the renal vein. I then went a few centimeters above that. The tissue was a little bit softer, and I was able to dissect through the upper perinephric tissue and circumferentially dissected the hilum. I continued to slowly thin out the hilum with taking care to avoid injury to the vessels. Once I got the hilum to a manageable thickness, I ligated and divided it using another load of the 45 stapler.   I then continued to develop a plane superiorly and between the adrenal gland and the kidney to the upper pole perinephritic fat. This was all done with the LigaSure device to obtain hemostasis. Once all the upper pole attachments were taken down, the kidney was removed and passed off as specimen. There was a little bit of ooze from the upper perinephric fatty tissue. I coated that with the remaining FloSeal and covered it with a piece of Surgicel. We then watched under low pressure, no bleeding was seen. We went back to the liver laceration which I had actually packed earlier with a small piece of Surgicel after filling it with FloSeal and no bleeding was seen. I looked back and there were still a lot of adhesions in the lower abdomen and left lower quadrant. I took these down bluntly with finger dissection quite easily and continued to mobilize the colon. I then removed the GelPort and inspected the adhesions close to the incision. I elected to go ahead and take down those further. Dr. Oralia Grimaldo, General Surgery, came in and in combination, we took down a lot of lower abdominal adhesions. The bowel was essentially matted together and the majority of those adhesions were taken down in conjunction with Dr. Oralia Grimaldo. Once we felt comfortable that enough adhesions had been lysed, we inspected the bowel, we found one serosal tear which he repaired and will dictate under separate cover. I then went back in with the GelPort, observed laparoscopically, lifted the bowel, liver, and retroperitoneal structures. No bleeding was seen. No injury to any underlying structures was seen. Indwelling lap pad was removed. Port sites were checked, no bleeding was seen. Lap ports and GelPort were removed. I did place a drain through the camera port site incision, placed it underneath the liver. Liver retractor had been removed. Liver went back into its normal anatomic position. No injury to the diaphragm was noted. Again, port sites were checked. The drain port site was closed with interrupted 0 Vicryl suture in the fascia and then a drain stitch was used to secure the drain. Also closed the upper 12-mm port site with interrupted 3-0 Vicryl suture. A midline incision was closed with running #1 PDS sutures, started at the apices and tied in the middle. Subcuticular stitches of Dermabond were used to close the skin. Overall, he tolerated the procedure well. He was awakened from anesthesia and taken to the recovery room in stable condition. He will be admitted for postoperative care.         Getachew Polo MD      [id number]  D:  06/14/2022 17:57  T:  06/15/2022 6:11  JOB #:  [id number]

## 2022-06-15 NOTE — PROGRESS NOTES
[address] [facility].  YOB: 1985          Assessment & Plan:   ESRD on HD MWF at [facility])  S/p R nephrectomy for infection  Anemia of CKD  H/o HTN    Rec:  HD today and MWF  Watch labs       Subjective:   CC: f/u ESRD  HPI: Admitted for nephrectomy due to chronic infection. Case required ashesiolysis and repair of a liver laceration by Dr. Paulette Rosenbaum. Doing fine today. Has ESRD on HD MWF at [facility], followed by Dr. Melvin Kolb. Last HD Mon.  ROS: no sob/n/v  Current Facility-Administered Medications   Medication Dose Route Frequency    sodium chloride (NS) flush 5-40 mL  5-40 mL IntraVENous PRN    . PHARMACY TO SUBSTITUTE PER PROTOCOL (Reordered from: OTHER)    Per Protocol    lactated Ringers infusion  100 mL/hr IntraVENous CONTINUOUS    sodium chloride (NS) flush 5-40 mL  5-40 mL IntraVENous Q8H    sodium chloride (NS) flush 5-40 mL  5-40 mL IntraVENous PRN    acetaminophen (TYLENOL) tablet 1,000 mg  1,000 mg Oral Q6H    lidocaine 4 % patch 1 Patch  1 Patch TransDERmal Q24H    oxyCODONE IR (ROXICODONE) tablet 5 mg  5 mg Oral Q4H PRN    morphine injection 2 mg  2 mg IntraVENous Q2H PRN    senna-docusate (PERICOLACE) 8.6-50 mg per tablet 1 Tablet  1 Tablet Oral BID    promethazine (PHENERGAN) tablet 12.5 mg  12.5 mg Oral Q6H PRN    Or    ondansetron (ZOFRAN) injection 4 mg  4 mg IntraVENous Q6H PRN    ceFAZolin (ANCEF) 1 g in sterile water (preservative free) 10 mL IV syringe  1 g IntraVENous Q24H          Objective:     Vitals:  Blood pressure 125/76, pulse (!) 57, temperature 98.2 °F (36.8 °C), resp. rate 18, height 5' 6\" (1.676 m), weight 67.8 kg (149 lb 7.6 oz), SpO2 98 %.   Temp (24hrs), Av.4 °F (36.9 °C), Min:97.8 °F (36.6 °C), Max:98.8 °F (37.1 °C)      Intake and Output:  06/15 0701 - 06/15 1900  In: 500 [P.O.:500]  Out: -   1901 - 06/15 0700  In: 820 [I.V.:820]  Out: 330 [Urine:150; Drains:80]    Physical Exam:               GENERAL ASSESSMENT: NAD  HEENT: Nontraumatic   CHEST: CTA  HEART: S1S2  EXTREMITY: no EDEMA, AVF  NEURO: Grossly non focal          ECG/rhythm:    Data Review      No results for input(s): TNIPOC in the last 72 hours. No lab exists for component: ITNL   No results for input(s): CPK, CKMB, TROIQ in the last 72 hours. Recent Labs     06/15/22  0304 06/14/22  1305   * 136   K HEMOLYZED,RECOLLECT REQUESTED 4.0   CL 95* 93*   CO2 30 35*   BUN 40* 32*   CREA 11.50* 10.50*   * 111*   CA 7.4* 7.8*   ALB 2.2*  --    WBC 14.7* 13.0*   HGB 10.0* 10.2*   HCT 32.3* 32.9*    236      No results for input(s): INR, PTP, APTT, INREXT in the last 72 hours. Needs: urine analysis, urine sodium, protein and creatinine  Lab Results   Component Value Date/Time    Sodium,urine random 74 11/09/2020 04:47 AM    Creatinine, urine 65.10 11/09/2020 04:47 AM         : Sudarshan Romero MD  6/15/2022        [facility]:  [email or web address]  [facility] office:  [address] [place] [address], [address] [place] [address],[address]  [place], [address] [facility]  Phone: [phone]  Fax :     [phone]    [place] office:  [address] [facility], [address] [facility]  Phone - [phone]  Fax - [phone]

## 2022-06-15 NOTE — PROGRESS NOTES
Progress Note    Patient: Ana Morning MRN: [id number]  SSN: [id number]    YOB: 1985  Age: 40 y.o. Sex: male        ADMITTED:  2022 to Francisco Lebron MD  for Pyonephrosis [N13.6]         Ana Morning is 1 Day Post-Op Procedure(s):  TOTAL RIGHT  LAPAROSCOPIC NEPHRECTOMY WITH LYSIS OF ADHESIONS. He is doing well. He has moderate pain. He has no nausea. He is tolerating clear liquids. Patient is voiding with difficulty. Vitals:  Temp (24hrs), Av.5 °F (36.9 °C), Min:97.8 °F (36.6 °C), Max:98.8 °F (37.1 °C)     Blood pressure 116/73, pulse 70, temperature 97.8 °F (36.6 °C), resp. rate 18, height 5' 6\" (1.676 m), weight 67.8 kg (149 lb 7.6 oz), SpO2 97 %. I&O's:   1901 - 06/15 0700  In: 820 [I.V.:820]  Out: 330 [Urine:150; Drains:80]   No intake/output data recorded. Exam:   abdomen soft, appropriately TTP at surgical sites. All incisions clean/dry/intact without evidence of infection. LAUREN with serosanguinous drainage.       Labs:   Recent Labs     06/15/22  0304 22  1305   WBC 14.7* 13.0*   HGB 10.0* 10.2*   HCT 32.3* 32.9*    236     Recent Labs     06/15/22  0304 22  1305   * 136   K HEMOLYZED,RECOLLECT REQUESTED 4.0   CL 95* 93*   CO2 30 35*   * 111*   BUN 40* 32*   CREA 11.50* 10.50*   CA 7.4* 7.8*        Cultures:      Imaging:       Assessment:     - 1 Day Post-Op Procedure(s):  TOTAL RIGHT  LAPAROSCOPIC NEPHRECTOMY WITH LYSIS OF ADHESIONS    Active Problems:    Pyonephrosis (2022)        Plan:     - remove lauren  - adv diet  - oob  - dialysis per nephrology  - home when ulises diet and pain controlled    Signed By: Savannah Buerger, MD - Vianney 15, 2022

## 2022-06-15 NOTE — PROGRESS NOTES
6/15/2022  3:39 PM  CM completed assessment w/ pt in person, CM wore mask at all times. Charted demographics verified, pt lives w/ girlfriend and their 1 and 3 yo Dtrs in a private residence, at baseline pt is ambulatory,  independent w/ all ADLs,drives, pt denies fall history. Pt ERSD on HD at [facility] MWF he drives himself. Reason for Admission:  Elective for Pyonephrosis,  Pt is a 40 AAM who presents to [facility] for scheduled Rt Nephrectomy                   RUR Score:     13 % Low Risk of Readmission/Green              Plan for utilizing home health:   NO history of Rehab or HH, pt is independent at baseline for ADLS   DME: None, No home O2  Rx; [place], pt is fully covered for his medications, uses [facility]   [address]  COVID Vax Hx; unvaccinated   PCP: First and Last name:  Other, MD Lui     Name of Practice:    Are you a current patient: Yes/No:    Approximate date of last visit:    Can you participate in a virtual visit with your PCP:                     Current Advanced Directive/Advance Care Plan: Full Code  [facility] girlfriend Del Stevenson C) [phone]    Healthcare Decision Maker:   Click here to complete "[employer] including selection of the Healthcare Decision Maker Relationship (ie \"Primary\")                             Transition of Care Plan:                    RUR 13 % Low Risk of Readmission  LOS 1 Day  1. Hospital admission for surgical management   2. POD #1 Rt Total Nephrectomy  3. Nephrology following, ERSD pt on HD MWF [facility], HD today  4. CM to follow through for treatment/response  5. DC when stable to home   6. Resume HD   7. Outpatient f/u urology  8. Girlfriend to transport at DC   9. CM will follow and assist w/ any DC needs.   Care Management Interventions  PCP Verified by CM: No (no PCP)  Palliative Care Criteria Met (RRAT>21 & CHF Dx)?: No  Mode of Transport at Discharge: Self (Girlfriend)  Physical Therapy Consult: No  Occupational Therapy Consult: No  Support Systems: Spouse/Significant Other (pt lives w/ grilfriend and 1&1 yo Dtrs in pvt residence, at baseline pt is ambulatory , iADLS, drives )  Confirm Follow Up Transport: Self  Discharge Location  Patient Expects to be Discharged to[de-identified] Home with outpatient services (resume [facility] )  ADOLFO Colin

## 2022-06-15 NOTE — OP NOTES
[facility]  OPERATIVE REPORT    Name:  Chaparrita Farmer  MR#:  [id number]  :  1985  ACCOUNT #:  [de-identified]  DATE OF SERVICE:  2022    PRIMARY CARE PROVIDER:  None. SURGEON:  Joby Valerio MD    Intraoperative consult for adhesiolysis, serosal injury and liver laceration. PREOPERATIVE DIAGNOSIS:  Renal insufficiency with infected right kidney. POSTOPERATIVE DIAGNOSIS:  Renal insufficiency with infected right kidney. PROCEDURE PERFORMED:  Total right laparoscopic nephrectomy with lysis of adhesions. SURGEON:  General Johann MD    ASSISTANT:  See record. ANESTHESIA:  General.    COMPLICATIONS:  Liver laceration. SPECIMENS REMOVED:  None. IMPLANTS:  N/a.    ESTIMATED BLOOD LOSS:  Less than 5 mL. FINDINGS:  Copious adhesions. INDICATION:  The patient is a 67-year-old male who required right nephrectomy. The patient has a history of a prior laparotomy. During the course of Dr. Lindsey Horvath surgical maneuver to extract the right kidney, he encountered extensive adhesions and liver laceration. I was called into the operating room for technical assistance. PROCEDURE:  After the procedure commenced, Dr. Jennifer Valerio made a lower midline incision to place a wound extractor and GelPort. The patient had multiple intra-abdominal thin adhesions to the serosa stuck to the anterior abdominal wall. Dr. Jennifer Valerio was able to proceed, but left most of the adhesions as they were. During the course of mobilization of the liver from the right abdominal sidewall, a liver laceration was made and Dr. Jennifer Valerio packed it with Surgicel appropriately. At the conclusion of the major portions of the case where the kidney was extracted, there were several points of adhesions. At that time, I was called in to evaluate. The liver laceration in depth was packed with Surgicel as appropriate and bleeding.   There was some nephric fat that was adhered to the antimesenteric surface of the hepatic flexure. This was taken down with electrocautery and dissected free and passed off as waste. The colon was appropriate and intact. Evaluating the small bowel, there were multiple intra-loop thin adhesions, quite copious and causing a mat of the small bowel. Using Metzenbaum scissors and a very gentle finger traction, we were able to free essentially all the adhesions that were stuck to the abdominal wall. There were some adhesions stuck into the deep pelvis, which did not appear to be pathologically involved. Adhesiolysis took approximately 20 minutes. There was a small serosal tear that was repaired with interrupted 3-0 Vicryl sutures unremarkably. At the conclusion of my portion of the case, the bowel was appropriate and appeared unremarkable from the adhesiolysis. At this portion, I broke scrub and Dr. Teresa Gamble continued with his portion of the case. My estimated blood was less than 5 mL.       Jean Nichols MD      [id number]  D:  06/14/2022 18:49  T:  06/15/2022 4:44  JOB #:  [id number]  CC:  Mikala Hewitt MD

## 2022-06-15 NOTE — PROGRESS NOTES
TRANSFER - IN REPORT:    Verbal report received from [facility]  on [facility]  being received from PACU for routine progression of care      Report consisted of patients Situation, Background, Assessment and   Recommendations(SBAR). Information from the following report(s) SBAR and Procedure Summary was reviewed with the receiving nurse. Opportunity for questions and clarification was provided. Assessment completed upon patients arrival to unit and care assumed.

## 2022-06-15 NOTE — PROGRESS NOTES
Problem: Falls - Risk of  Goal: *Absence of Falls  Description: Document Son Aguilar Fall Risk and appropriate interventions in the flowsheet.   Outcome: Progressing Towards Goal  Note: Fall Risk Interventions:            Medication Interventions: Patient to call before getting OOB,Teach patient to arise slowly                   Problem: Patient Education: Go to Patient Education Activity  Goal: Patient/Family Education  Outcome: Progressing Towards Goal     Problem: General Medical Care Plan  Goal: *Vital signs within specified parameters  Outcome: Progressing Towards Goal  Goal: *Labs within defined limits  Outcome: Progressing Towards Goal  Goal: *Absence of infection signs and symptoms  Outcome: Progressing Towards Goal  Goal: *Optimal pain control at patient's stated goal  Outcome: Progressing Towards Goal  Goal: *Skin integrity maintained  Outcome: Progressing Towards Goal  Goal: *Fluid volume balance  Outcome: Progressing Towards Goal  Goal: *Optimize nutritional status  Outcome: Progressing Towards Goal  Goal: *Anxiety reduced or absent  Outcome: Progressing Towards Goal  Goal: *Progressive mobility and function (eg: ADL's)  Outcome: Progressing Towards Goal     Problem: Patient Education: Go to Patient Education Activity  Goal: Patient/Family Education  Outcome: Progressing Towards Goal

## 2022-06-15 NOTE — DIALYSIS
Hemodialysis / [phone]    Vitals Pre Post Assessment Pre Post   BP BP: 113/69 (06/15/22 1345) 118/68 LOC A&O x 3 A&O x 3   HR Pulse (Heart Rate): 63 (06/15/22 1345) 68 Lungs clear clear   Resp Resp Rate: 19 (06/15/22 1345) 18 Cardiac regular regular   Temp Temp: 97.8 °F (36.6 °C) (06/15/22 1135) 98 Skin warm warm   Weight Pre-Dialysis Weight: 70 kg (154 lb 5.2 oz) (06/15/22 1135) 69.8 kg Edema No edema No edema   Tele status remote remote Pain Pain Intensity 1: 7 (06/15/22 0840) 5/7 medicaided/effective     Orders   Duration: Start: 1140 End: 1510 Total: 3.5 hr   Dialyzer: Dialyzer/Set Up Inspection: Antoine Ashlee ([id number]) (06/15/22 1135)   K Bath: Dialysate K (mEq/L): 2 (06/15/22 1135)   Ca Bath: Dialysate CA (mEq/L): 2.5 (06/15/22 1135)   Na: Dialysate NA (mEq/L): 140 (06/15/22 1135)   Bicarb: Dialysate HCO3 (mEq/L): 38 (06/15/22 1135)   Target Fluid Removal: Goal/Amount of Fluid to Remove (mL): 1000 mL (06/15/22 1135)     Access   Type & Location: RUAS AVG   Comments:                         Patent, B&T positive, cannulated with 15 g needless x 2               Labs   HBsAg (Antigen) / date:    06/15/22 Unkown                                           HBsAb (Antibody) / date: 06/15/22 Unkown   Source: [facility]   Obtained/Reviewed  Critical Results Called HGB   Date Value Ref Range Status   06/15/2022 10.0 (L) 12.1 - 17.0 g/dL Final     Potassium   Date Value Ref Range Status   06/15/2022 HEMOLYZED,RECOLLECT REQUESTED 3.5 - 5.1 mmol/L Final     Calcium   Date Value Ref Range Status   06/15/2022 7.4 (L) 8.5 - 10.1 MG/DL Final     BUN   Date Value Ref Range Status   06/15/2022 40 (H) 6 - 20 MG/DL Final     Creatinine   Date Value Ref Range Status   06/15/2022 11.50 (H) 0.70 - 1.30 MG/DL Final        Meds Given   Name Dose Route                    Adequacy / Fluid    Total Liters Process: 71 L   Net Fluid Removed: 1000 ml      Comments   Time Out Done:   (Time) Yes, 1135   Admitting Diagnosis: Renal failure   Consent obtained/signed:  chronic, [facility] / RO # Machine Number: [id number] (06/15/22 6601)   Primary Nurse Rpt Pre: Dannie Ahn RN   Primary Nurse Rpt Post: Dannie Ahn RN   Pt Education: Yes, r/t dialysis process   Care Plan: Continue HD as ordered   Pts outpatient clinic:      Tx Summary   Comments: Tolerated tx well, at end, all blood in circuit returned with 300 ml NS, ADONIS AVG patent, B&T positive, bleeding stopped at both sites, pressure dressing in place.

## 2022-06-16 VITALS
OXYGEN SATURATION: 98 % | HEART RATE: 69 BPM | RESPIRATION RATE: 18 BRPM | WEIGHT: 149.47 LBS | DIASTOLIC BLOOD PRESSURE: 82 MMHG | SYSTOLIC BLOOD PRESSURE: 133 MMHG | TEMPERATURE: 98.6 F | HEIGHT: 66 IN | BODY MASS INDEX: 24.02 KG/M2

## 2022-06-16 LAB
ANION GAP SERPL CALC-SCNC: 8 MMOL/L (ref 5–15)
BUN SERPL-MCNC: 28 MG/DL (ref 6–20)
BUN/CREAT SERPL: 3 (ref 12–20)
CALCIUM SERPL-MCNC: 8.3 MG/DL (ref 8.5–10.1)
CHLORIDE SERPL-SCNC: 100 MMOL/L (ref 97–108)
CO2 SERPL-SCNC: 29 MMOL/L (ref 21–32)
CREAT SERPL-MCNC: 8.76 MG/DL (ref 0.7–1.3)
ERYTHROCYTE [DISTWIDTH] IN BLOOD BY AUTOMATED COUNT: 16.8 % (ref 11.5–14.5)
GLUCOSE SERPL-MCNC: 112 MG/DL (ref 65–100)
HCT VFR BLD AUTO: 32.7 % (ref 36.6–50.3)
HGB BLD-MCNC: 10.1 G/DL (ref 12.1–17)
MCH RBC QN AUTO: 30.1 PG (ref 26–34)
MCHC RBC AUTO-ENTMCNC: 30.9 G/DL (ref 30–36.5)
MCV RBC AUTO: 97.6 FL (ref 80–99)
NRBC # BLD: 0 K/UL (ref 0–0.01)
NRBC BLD-RTO: 0 PER 100 WBC
PLATELET # BLD AUTO: 339 K/UL (ref 150–400)
PMV BLD AUTO: 9.5 FL (ref 8.9–12.9)
POTASSIUM SERPL-SCNC: 4.9 MMOL/L (ref 3.5–5.1)
RBC # BLD AUTO: 3.35 M/UL (ref 4.1–5.7)
SODIUM SERPL-SCNC: 137 MMOL/L (ref 136–145)
WBC # BLD AUTO: 12.3 K/UL (ref 4.1–11.1)

## 2022-06-16 PROCEDURE — 36415 COLL VENOUS BLD VENIPUNCTURE: CPT

## 2022-06-16 PROCEDURE — 74011000250 HC RX REV CODE- 250: Performed by: UROLOGY

## 2022-06-16 PROCEDURE — 94761 N-INVAS EAR/PLS OXIMETRY MLT: CPT

## 2022-06-16 PROCEDURE — 74011250637 HC RX REV CODE- 250/637: Performed by: UROLOGY

## 2022-06-16 PROCEDURE — 80048 BASIC METABOLIC PNL TOTAL CA: CPT

## 2022-06-16 PROCEDURE — 85027 COMPLETE CBC AUTOMATED: CPT

## 2022-06-16 PROCEDURE — 74011250636 HC RX REV CODE- 250/636: Performed by: UROLOGY

## 2022-06-16 RX ADMIN — OXYCODONE 5 MG: 5 TABLET ORAL at 00:45

## 2022-06-16 RX ADMIN — ACETAMINOPHEN 1000 MG: 500 TABLET ORAL at 01:18

## 2022-06-16 RX ADMIN — MORPHINE SULFATE 2 MG: 2 INJECTION, SOLUTION INTRAMUSCULAR; INTRAVENOUS at 08:54

## 2022-06-16 RX ADMIN — DOCUSATE SODIUM 50MG AND SENNOSIDES 8.6MG 1 TABLET: 8.6; 5 TABLET, FILM COATED ORAL at 08:53

## 2022-06-16 RX ADMIN — SODIUM CHLORIDE, PRESERVATIVE FREE 10 ML: 5 INJECTION INTRAVENOUS at 12:09

## 2022-06-16 RX ADMIN — SODIUM CHLORIDE, PRESERVATIVE FREE 10 ML: 5 INJECTION INTRAVENOUS at 06:30

## 2022-06-16 RX ADMIN — OXYCODONE 5 MG: 5 TABLET ORAL at 12:08

## 2022-06-16 RX ADMIN — ACETAMINOPHEN 1000 MG: 500 TABLET ORAL at 08:53

## 2022-06-16 RX ADMIN — ACETAMINOPHEN 1000 MG: 500 TABLET ORAL at 15:02

## 2022-06-16 RX ADMIN — OXYCODONE 5 MG: 5 TABLET ORAL at 06:30

## 2022-06-16 NOTE — PROGRESS NOTES
Pharmacist Discharge Medication Reconciliation    Discharge Provider:  Angelica Benitez     Discharge Medications:      My Medications        START taking these medications        Instructions Each Dose to Equal Morning Noon Evening Bedtime   oxyCODONE IR 5 mg immediate release tablet  Commonly known as: Miesha Gant  Your last dose was: 6/16/22 at 12 PM  Your next dose is: May take 6/16/22 at 4 pm as needed for pain      Take 1 Tablet by mouth every four (4) hours as needed for Pain for up to 3 days. Max Daily Amount: 30 mg.   5 mg                CONTINUE taking these medications        Instructions Each Dose to Equal Morning Noon Evening Bedtime   amoxicillin-clavulanate 500-125 mg per tablet  Commonly known as: AUGMENTIN  Your last dose was: Not given in hospital  Your next dose is: Resume home schedule      Take 1 Tablet by mouth every evening. Indications: bacterial urinary tract infection   1 Tablet         OTHER  Your last dose was: Not given in hospital  Your next dose is: Resume home schedule      Take 1 Tablet by mouth three (3) times daily (with meals).  Phosphorus/Calcium Binders for dialysis   1 Tablet                   Where to Get Your Medications        These medications were sent to [address] [place] [address]      Phone: [phone]   oxyCODONE IR 5 mg immediate release tablet            The patient's chart, MAR, and AVS were reviewed by   Noemy Morealnd,   Contact: [phone]

## 2022-06-16 NOTE — PROGRESS NOTES
[address] [facility].  YOB: 1985          Assessment & Plan:   ESRD on HD MWF at [facility])  S/p R nephrectomy for infection  Anemia of CKD  H/o HTN    Rec:  HD tomorrow and MWF  OK for d/c from renal standpoint. Go to outpt HD tomorrow if d/c       Subjective:   CC: f/u ESRD  HPI: HD yest ulises well  ROS: no sob +post op discomfort  Current Facility-Administered Medications   Medication Dose Route Frequency    sodium chloride (NS) flush 5-40 mL  5-40 mL IntraVENous PRN    lactated Ringers infusion  100 mL/hr IntraVENous CONTINUOUS    sodium chloride (NS) flush 5-40 mL  5-40 mL IntraVENous Q8H    sodium chloride (NS) flush 5-40 mL  5-40 mL IntraVENous PRN    acetaminophen (TYLENOL) tablet 1,000 mg  1,000 mg Oral Q6H    lidocaine 4 % patch 1 Patch  1 Patch TransDERmal Q24H    oxyCODONE IR (ROXICODONE) tablet 5 mg  5 mg Oral Q4H PRN    morphine injection 2 mg  2 mg IntraVENous Q2H PRN    senna-docusate (PERICOLACE) 8.6-50 mg per tablet 1 Tablet  1 Tablet Oral BID    promethazine (PHENERGAN) tablet 12.5 mg  12.5 mg Oral Q6H PRN    Or    ondansetron (ZOFRAN) injection 4 mg  4 mg IntraVENous Q6H PRN          Objective:     Vitals:  Blood pressure 132/83, pulse 65, temperature 98.8 °F (37.1 °C), resp. rate 18, height 5' 6\" (1.676 m), weight 67.8 kg (149 lb 7.6 oz), SpO2 98 %. Temp (24hrs), Av.2 °F (36.8 °C), Min:97.8 °F (36.6 °C), Max:98.8 °F (37.1 °C)      Intake and Output:   07 - 0  In: 240 [P.O.:240]  Out: -   1901 -  0700  In: 18 [P.O.:860]  Out: 1500 [Urine:400; Drains:100]    Physical Exam:               GENERAL ASSESSMENT: NAD  HEENT: Nontraumatic   CHEST: Unlabored  EXTREMITY: no EDEMA, AVF  NEURO: Grossly non focal          ECG/rhythm:    Data Review      No results for input(s): TNIPOC in the last 72 hours.     No lab exists for component: ITNL   No results for input(s): CPK, CKMB, TROIQ in the last 72 hours. Recent Labs     06/16/22  0950 06/15/22  0304 06/14/22  1305    134* 136   K 4.9 HEMOLYZED,RECOLLECT REQUESTED 4.0    95* 93*   CO2 29 30 35*   BUN 28* 40* 32*   CREA 8.76* 11.50* 10.50*   * 105* 111*   CA 8.3* 7.4* 7.8*   ALB  --  2.2*  --    WBC 12.3* 14.7* 13.0*   HGB 10.1* 10.0* 10.2*   HCT 32.7* 32.3* 32.9*    296 236      No results for input(s): INR, PTP, APTT, INREXT, INREXT in the last 72 hours. Needs: urine analysis, urine sodium, protein and creatinine  Lab Results   Component Value Date/Time    Sodium,urine random 74 11/09/2020 04:47 AM    Creatinine, urine 65.10 11/09/2020 04:47 AM         : Zaheer Darden MD  6/16/2022        [facility]:  [email or web address]  [facility] office:  [address] [address]  [place], [address] [facility]  Phone: [phone]  Fax :     [phone]    [facility] office:  [address] [facility],  Demarco [place]  Phone - [phone]  Fax - [phone]

## 2022-06-16 NOTE — PROGRESS NOTES
Problem: Falls - Risk of  Goal: *Absence of Falls  Description: Document Tala Larsen Fall Risk and appropriate interventions in the flowsheet.   Outcome: Progressing Towards Goal  Note: Fall Risk Interventions:            Medication Interventions: Patient to call before getting OOB,Teach patient to arise slowly         History of Falls Interventions: Utilize gait belt for transfer/ambulation         Problem: Patient Education: Go to Patient Education Activity  Goal: Patient/Family Education  Outcome: Progressing Towards Goal     Problem: General Medical Care Plan  Goal: *Vital signs within specified parameters  Outcome: Progressing Towards Goal  Goal: *Labs within defined limits  Outcome: Progressing Towards Goal  Goal: *Absence of infection signs and symptoms  Outcome: Progressing Towards Goal  Goal: *Optimal pain control at patient's stated goal  Outcome: Progressing Towards Goal  Goal: *Skin integrity maintained  Outcome: Progressing Towards Goal  Goal: *Fluid volume balance  Outcome: Progressing Towards Goal  Goal: *Optimize nutritional status  Outcome: Progressing Towards Goal  Goal: *Anxiety reduced or absent  Outcome: Progressing Towards Goal  Goal: *Progressive mobility and function (eg: ADL's)  Outcome: Progressing Towards Goal     Problem: Patient Education: Go to Patient Education Activity  Goal: Patient/Family Education  Outcome: Progressing Towards Goal     Problem: Pain  Goal: *Control of Pain  Outcome: Progressing Towards Goal     Problem: Patient Education: Go to Patient Education Activity  Goal: Patient/Family Education  Outcome: Progressing Towards Goal

## 2022-06-16 NOTE — PROGRESS NOTES
Urology Progress Note    Subjective:     Daily Progress Note: 2022 8:01 AM    Ubaldo Pleitez is 2 Days Post-Op TOTAL RIGHT  LAPAROSCOPIC NEPHRECTOMY WITH LYSIS OF ADHESIONS and doing good. He reports pain is moderately controlled. He has no new complaints. He is tolerating a solid diet and ambulating without assistance. JEANCARLOS removed yesterday   Dialysis yesterday  Expectations for postop pain discussed in length with pt  Seen with Dr. Agustin Muñoz     Objective:     Visit Vitals  /62 (BP 1 Location: Left upper arm, BP Patient Position: At rest;Supine)   Pulse 74   Temp 98.4 °F (36.9 °C)   Resp 18   Ht 5' 6\" (1.676 m)   Wt 67.8 kg (149 lb 7.6 oz)   SpO2 98%   BMI 24.13 kg/m²        Temp (24hrs), Av °F (36.7 °C), Min:97.8 °F (36.6 °C), Max:98.4 °F (36.9 °C)      Intake and Output:   1901 -  0700  In: 860 [P.O.:860]  Out: 1500 [Urine:400; Drains:100]  No intake/output data recorded. Physical Exam:   General appearance: alert, cooperative, no distress, appears stated age  Respiratory: no distress, room air   Abdomen: soft, appropriately tender  Extremities: moves all     Incision: clean, dry, no signs of infection    Data Review:    Recent Results (from the past 24 hour(s))   HEP B SURFACE AG    Collection Time: 06/15/22 11:55 AM   Result Value Ref Range    Hepatitis B surface Ag <0.10 Index    Hep B surface Ag Interp. Negative NEG     HEP B SURFACE AB    Collection Time: 06/15/22 11:55 AM   Result Value Ref Range    Hepatitis B surface Ab >1,000.00 mIU/mL    Hep B surface Ab Interp.  REACTIVE (A) NR         Assessment/Plan:     Active Problems:    Pyonephrosis (2022)        Status Post:  Procedure(s):  TOTAL RIGHT  LAPAROSCOPIC NEPHRECTOMY WITH LYSIS OF ADHESIONS     Plan:   - Increase Mobilization, OOB  - Incentive Spirometer  - Pain control   - CBC and BMP ordered  - Hopeful to DC later from a urologic standpoint, awaiting assessment from gen surg and nephrology- recs greatly appreciated -Will reassess later today     Signed By: Cesia Pate NP                         June 16, 2022

## 2022-06-16 NOTE — PROGRESS NOTES
Bedside and Verbal shift change report given to Helen Paul RN (oncoming nurse) by Constance Gleason RN (offgoing nurse). Report included the following information SBAR, Kardex and MAR.

## 2022-06-16 NOTE — PROGRESS NOTES
Problem: Falls - Risk of  Goal: *Absence of Falls  Description: Document Newton Merino Fall Risk and appropriate interventions in the flowsheet.   Outcome: Progressing Towards Goal  Note: Fall Risk Interventions:            Medication Interventions: Bed/chair exit alarm,Evaluate medications/consider consulting pharmacy,Patient to call before getting OOB,Teach patient to arise slowly                   Problem: Patient Education: Go to Patient Education Activity  Goal: Patient/Family Education  Outcome: Progressing Towards Goal     Problem: Pain  Goal: *Control of Pain  Outcome: Progressing Towards Goal     Problem: Patient Education: Go to Patient Education Activity  Goal: Patient/Family Education  Outcome: Progressing Towards Goal

## 2022-06-16 NOTE — PROGRESS NOTES
6/16/2022  2:50 PM  DC order noted, pt medically stable for DC to home today. Resume HD at [address] [place] [address]. Pt will follow-up outpatient w/ urology. Family will transport home.   Pt is ready for DC from CM standpoint  Care Management Interventions  PCP Verified by CM: No (no PCP)  Palliative Care Criteria Met (RRAT>21 & CHF Dx)?: No  Mode of Transport at Discharge: Self (Girlfriend)  Physical Therapy Consult: No  Occupational Therapy Consult: No  Support Systems: Spouse/Significant Other (pt lives w/ grilfriend and 1&1 yo Dtrs in pvt residence, at baseline pt is ambulatory , iADLS, drives )  Confirm Follow Up Transport: Self  Discharge Location  Patient Expects to be Discharged to[de-identified] Home with outpatient services (resume HD [facility] )  ADOLFO Mratin

## 2022-06-16 NOTE — PROGRESS NOTES
Sleeping, no issues. Tolerating some diet. +Flatus. Vitals reviewed. Incisions clean. POD2  [facility]      Zeus Jackson MD

## 2022-06-16 NOTE — PROGRESS NOTES
I have reviewed discharge instructions with the patient. The patient verbalized understanding. The opportunity for questions was presented.

## 2022-06-16 NOTE — DISCHARGE SUMMARY
Urology Discharge Summary    Patient: Cal Gutierrez MRN: [id number]  SSN: [id number]    YOB: 1985  Age: 40 y.o. Sex: male               ADMISSION:  to Lyn Vu MD by Susannah Chan MD  6/14/2022 ADMISSION DIAGNOSIS: Pyonephrosis [N13.6]  6/16/2022 DISCHARGE DIAGNOSIS: [x]    Same  CONSULTS: General Surgery, Nephrology   PROCEDURES: POD# 2 Days Post-Op Procedure(s):  TOTAL RIGHT  LAPAROSCOPIC NEPHRECTOMY WITH LYSIS OF ADHESIONS    RECENT LABS:   Recent Results (from the past 24 hour(s))   CBC W/O DIFF    Collection Time: 06/16/22  9:50 AM   Result Value Ref Range    WBC 12.3 (H) 4.1 - 11.1 K/uL    RBC 3.35 (L) 4.10 - 5.70 M/uL    HGB 10.1 (L) 12.1 - 17.0 g/dL    HCT 32.7 (L) 36.6 - 50.3 %    MCV 97.6 80.0 - 99.0 FL    MCH 30.1 26.0 - 34.0 PG    MCHC 30.9 30.0 - 36.5 g/dL    RDW 16.8 (H) 11.5 - 14.5 %    PLATELET 270 878 - 537 K/uL    MPV 9.5 8.9 - 12.9 FL    NRBC 0.0 0  WBC    ABSOLUTE NRBC 0.00 0.00 - 6.14 K/uL   METABOLIC PANEL, BASIC    Collection Time: 06/16/22  9:50 AM   Result Value Ref Range    Sodium 137 136 - 145 mmol/L    Potassium 4.9 3.5 - 5.1 mmol/L    Chloride 100 97 - 108 mmol/L    CO2 29 21 - 32 mmol/L    Anion gap 8 5 - 15 mmol/L    Glucose 112 (H) 65 - 100 mg/dL    BUN 28 (H) 6 - 20 MG/DL    Creatinine 8.76 (H) 0.70 - 1.30 MG/DL    BUN/Creatinine ratio 3 (L) 12 - 20      GFR est AA 8 (L) >60 ml/min/1.73m2    GFR est non-AA 7 (L) >60 ml/min/1.73m2    Calcium 8.3 (L) 8.5 - 10.1 MG/DL        HOSPITAL COURSE: [x]    Uncomplicated.    CONDITION AT DISCHARGE: Stable    COMPLICATIONS: [x]    None identified  DISCHARGE TO:     [x]    Home  []    Rehab []    SNF  FOLLOWUP: one week  DISCHARGE MEDS:     []    IT IS INTENDED THAT YOU CONTINUE TO TAKE YOUR PRIOR MEDICATIONS WITHOUT CHANGES WITH THE EXCEPTION OF:  [x]    NONE    DIALYSIS SCHEDULED TOMORROW OUTPATIENT     Paxton Mayfield NP 6/16/2022 12:06 PM

## 2023-05-21 RX ORDER — AMOXICILLIN AND CLAVULANATE POTASSIUM 500; 125 MG/1; MG/1
1 TABLET, FILM COATED ORAL EVERY EVENING
COMMUNITY
Start: 2022-06-06

## 2023-11-06 NOTE — PERIOP NOTE
Multiple attempts to contact patient to do PAT phone assessment before surgery tomorrow. Unable to reach patient. LVM with TOA, location, and preop instructions including NPO after midnight. PC to [facility] for recent labs.

## 2023-11-07 ENCOUNTER — ANESTHESIA (OUTPATIENT)
Facility: HOSPITAL | Age: 38
End: 2023-11-07
Payer: MEDICARE

## 2023-11-07 ENCOUNTER — ANESTHESIA EVENT (OUTPATIENT)
Facility: HOSPITAL | Age: 38
End: 2023-11-07
Payer: MEDICARE

## 2023-11-07 ENCOUNTER — HOSPITAL ENCOUNTER (OUTPATIENT)
Facility: HOSPITAL | Age: 38
Setting detail: OUTPATIENT SURGERY
Discharge: HOME | End: 2023-11-07
Attending: SURGERY | Admitting: SURGERY
Payer: MEDICARE

## 2023-11-07 ENCOUNTER — APPOINTMENT (OUTPATIENT)
Facility: HOSPITAL | Age: 38
End: 2023-11-07
Attending: SURGERY
Payer: MEDICARE

## 2023-11-07 VITALS
TEMPERATURE: 97.8 F | HEART RATE: 62 BPM | WEIGHT: 163.14 LBS | OXYGEN SATURATION: 97 % | RESPIRATION RATE: 18 BRPM | SYSTOLIC BLOOD PRESSURE: 138 MMHG | DIASTOLIC BLOOD PRESSURE: 87 MMHG | HEIGHT: 66 IN | BODY MASS INDEX: 26.22 KG/M2

## 2023-11-07 DIAGNOSIS — G89.18 POST-OP PAIN: Primary | ICD-10-CM

## 2023-11-07 LAB
ABO + RH BLD: NORMAL
ANION GAP BLD CALC-SCNC: 10.3 MMOL/L (ref 10–20)
BLOOD GROUP ANTIBODIES SERPL: NORMAL
CA-I BLD-MCNC: 1.03 MMOL/L (ref 1.12–1.32)
CHLORIDE BLD-SCNC: 100 MMOL/L (ref 98–107)
CO2 BLD-SCNC: 28.7 MMOL/L (ref 21–32)
CREAT BLD-MCNC: 14.67 MG/DL (ref 0.6–1.3)
GLUCOSE BLD-MCNC: 100 MG/DL (ref 65–100)
POTASSIUM BLD-SCNC: 3.8 MMOL/L (ref 3.5–5.1)
SERVICE CMNT-IMP: ABNORMAL
SODIUM BLD-SCNC: 139 MMOL/L (ref 136–145)
SPECIMEN EXP DATE BLD: NORMAL

## 2023-11-07 PROCEDURE — 6370000000 HC RX 637 (ALT 250 FOR IP): Performed by: SURGERY

## 2023-11-07 PROCEDURE — 6360000004 HC RX CONTRAST MEDICATION: Performed by: SURGERY

## 2023-11-07 PROCEDURE — 7100000001 HC PACU RECOVERY - ADDTL 15 MIN: Performed by: SURGERY

## 2023-11-07 PROCEDURE — C1894 INTRO/SHEATH, NON-LASER: HCPCS | Performed by: SURGERY

## 2023-11-07 PROCEDURE — 2580000003 HC RX 258: Performed by: ANESTHESIOLOGY

## 2023-11-07 PROCEDURE — 3700000000 HC ANESTHESIA ATTENDED CARE: Performed by: SURGERY

## 2023-11-07 PROCEDURE — 6360000002 HC RX W HCPCS: Performed by: SURGERY

## 2023-11-07 PROCEDURE — 6370000000 HC RX 637 (ALT 250 FOR IP): Performed by: ANESTHESIOLOGY

## 2023-11-07 PROCEDURE — 2580000003 HC RX 258: Performed by: SURGERY

## 2023-11-07 PROCEDURE — 86901 BLOOD TYPING SEROLOGIC RH(D): CPT

## 2023-11-07 PROCEDURE — 86900 BLOOD TYPING SEROLOGIC ABO: CPT

## 2023-11-07 PROCEDURE — C1769 GUIDE WIRE: HCPCS | Performed by: SURGERY

## 2023-11-07 PROCEDURE — 3700000001 HC ADD 15 MINUTES (ANESTHESIA): Performed by: SURGERY

## 2023-11-07 PROCEDURE — A4217 STERILE WATER/SALINE, 500 ML: HCPCS | Performed by: SURGERY

## 2023-11-07 PROCEDURE — 7100000000 HC PACU RECOVERY - FIRST 15 MIN: Performed by: SURGERY

## 2023-11-07 PROCEDURE — 6360000002 HC RX W HCPCS: Performed by: REGISTERED NURSE

## 2023-11-07 PROCEDURE — 3600000007 HC SURGERY HYBRID BASE: Performed by: SURGERY

## 2023-11-07 PROCEDURE — 2500000003 HC RX 250 WO HCPCS: Performed by: REGISTERED NURSE

## 2023-11-07 PROCEDURE — 2500000003 HC RX 250 WO HCPCS: Performed by: ANESTHESIOLOGY

## 2023-11-07 PROCEDURE — C1874 STENT, COATED/COV W/DEL SYS: HCPCS | Performed by: SURGERY

## 2023-11-07 PROCEDURE — 3600000017 HC SURGERY HYBRID ADDL 15MIN: Performed by: SURGERY

## 2023-11-07 PROCEDURE — 86850 RBC ANTIBODY SCREEN: CPT

## 2023-11-07 PROCEDURE — 80047 BASIC METABLC PNL IONIZED CA: CPT

## 2023-11-07 PROCEDURE — 2720000010 HC SURG SUPPLY STERILE: Performed by: SURGERY

## 2023-11-07 PROCEDURE — 2709999900 HC NON-CHARGEABLE SUPPLY: Performed by: SURGERY

## 2023-11-07 PROCEDURE — 36415 COLL VENOUS BLD VENIPUNCTURE: CPT

## 2023-11-07 PROCEDURE — C1757 CATH, THROMBECTOMY/EMBOLECT: HCPCS | Performed by: SURGERY

## 2023-11-07 DEVICE — VIABAHN SX ENDO HEPARIN 18 RO 8MMX5CM 7FR 120CM CATH
Type: IMPLANTABLE DEVICE | Site: ARM | Status: FUNCTIONAL
Brand: GORE VIABAHN ENDOPROSTHESIS WITH HEPARIN

## 2023-11-07 RX ORDER — SODIUM CHLORIDE 0.9 % (FLUSH) 0.9 %
5-40 SYRINGE (ML) INJECTION PRN
Status: DISCONTINUED | OUTPATIENT
Start: 2023-11-07 | End: 2023-11-07 | Stop reason: HOSPADM

## 2023-11-07 RX ORDER — SODIUM CHLORIDE 9 MG/ML
INJECTION, SOLUTION INTRAVENOUS CONTINUOUS
Status: DISCONTINUED | OUTPATIENT
Start: 2023-11-07 | End: 2023-11-07 | Stop reason: HOSPADM

## 2023-11-07 RX ORDER — TRAMADOL HYDROCHLORIDE 50 MG/1
50 TABLET ORAL EVERY 6 HOURS PRN
Qty: 12 TABLET | Refills: 0 | Status: SHIPPED | OUTPATIENT
Start: 2023-11-07 | End: 2023-11-10

## 2023-11-07 RX ORDER — HEPARIN SODIUM 5000 [USP'U]/ML
INJECTION, SOLUTION INTRAVENOUS; SUBCUTANEOUS PRN
Status: DISCONTINUED | OUTPATIENT
Start: 2023-11-07 | End: 2023-11-07 | Stop reason: SDUPTHER

## 2023-11-07 RX ORDER — MIDAZOLAM HYDROCHLORIDE 1 MG/ML
INJECTION INTRAMUSCULAR; INTRAVENOUS PRN
Status: DISCONTINUED | OUTPATIENT
Start: 2023-11-07 | End: 2023-11-07 | Stop reason: SDUPTHER

## 2023-11-07 RX ORDER — ACETAMINOPHEN 500 MG
1000 TABLET ORAL ONCE
Status: COMPLETED | OUTPATIENT
Start: 2023-11-07 | End: 2023-11-07

## 2023-11-07 RX ORDER — SODIUM CHLORIDE 9 MG/ML
INJECTION, SOLUTION INTRAVENOUS PRN
Status: DISCONTINUED | OUTPATIENT
Start: 2023-11-07 | End: 2023-11-07 | Stop reason: HOSPADM

## 2023-11-07 RX ORDER — HYDROMORPHONE HYDROCHLORIDE 2 MG/ML
INJECTION, SOLUTION INTRAMUSCULAR; INTRAVENOUS; SUBCUTANEOUS PRN
Status: DISCONTINUED | OUTPATIENT
Start: 2023-11-07 | End: 2023-11-07 | Stop reason: SDUPTHER

## 2023-11-07 RX ORDER — MIDAZOLAM HYDROCHLORIDE 2 MG/2ML
2 INJECTION, SOLUTION INTRAMUSCULAR; INTRAVENOUS
Status: DISCONTINUED | OUTPATIENT
Start: 2023-11-07 | End: 2023-11-07 | Stop reason: HOSPADM

## 2023-11-07 RX ORDER — HYDRALAZINE HYDROCHLORIDE 20 MG/ML
10 INJECTION INTRAMUSCULAR; INTRAVENOUS
Status: DISCONTINUED | OUTPATIENT
Start: 2023-11-07 | End: 2023-11-07 | Stop reason: HOSPADM

## 2023-11-07 RX ORDER — ONDANSETRON 2 MG/ML
INJECTION INTRAMUSCULAR; INTRAVENOUS PRN
Status: DISCONTINUED | OUTPATIENT
Start: 2023-11-07 | End: 2023-11-07 | Stop reason: SDUPTHER

## 2023-11-07 RX ORDER — SODIUM CHLORIDE 0.9 % (FLUSH) 0.9 %
5-40 SYRINGE (ML) INJECTION EVERY 12 HOURS SCHEDULED
Status: DISCONTINUED | OUTPATIENT
Start: 2023-11-07 | End: 2023-11-07 | Stop reason: HOSPADM

## 2023-11-07 RX ORDER — SEVELAMER CARBONATE 800 MG/1
1 TABLET, FILM COATED ORAL
COMMUNITY

## 2023-11-07 RX ORDER — PROPOFOL 10 MG/ML
INJECTION, EMULSION INTRAVENOUS PRN
Status: DISCONTINUED | OUTPATIENT
Start: 2023-11-07 | End: 2023-11-07 | Stop reason: SDUPTHER

## 2023-11-07 RX ORDER — LIDOCAINE HYDROCHLORIDE 20 MG/ML
INJECTION, SOLUTION EPIDURAL; INFILTRATION; INTRACAUDAL; PERINEURAL PRN
Status: DISCONTINUED | OUTPATIENT
Start: 2023-11-07 | End: 2023-11-07 | Stop reason: SDUPTHER

## 2023-11-07 RX ORDER — PROCHLORPERAZINE EDISYLATE 5 MG/ML
5 INJECTION INTRAMUSCULAR; INTRAVENOUS
Status: DISCONTINUED | OUTPATIENT
Start: 2023-11-07 | End: 2023-11-07 | Stop reason: HOSPADM

## 2023-11-07 RX ORDER — DIPHENHYDRAMINE HYDROCHLORIDE 50 MG/ML
INJECTION INTRAMUSCULAR; INTRAVENOUS PRN
Status: DISCONTINUED | OUTPATIENT
Start: 2023-11-07 | End: 2023-11-07 | Stop reason: SDUPTHER

## 2023-11-07 RX ORDER — LIDOCAINE HYDROCHLORIDE 10 MG/ML
1 INJECTION, SOLUTION EPIDURAL; INFILTRATION; INTRACAUDAL; PERINEURAL
Status: DISCONTINUED | OUTPATIENT
Start: 2023-11-07 | End: 2023-11-07 | Stop reason: HOSPADM

## 2023-11-07 RX ORDER — NEOSTIGMINE METHYLSULFATE 1 MG/ML
INJECTION, SOLUTION INTRAVENOUS PRN
Status: DISCONTINUED | OUTPATIENT
Start: 2023-11-07 | End: 2023-11-07 | Stop reason: SDUPTHER

## 2023-11-07 RX ORDER — ONDANSETRON 2 MG/ML
4 INJECTION INTRAMUSCULAR; INTRAVENOUS
Status: DISCONTINUED | OUTPATIENT
Start: 2023-11-07 | End: 2023-11-07 | Stop reason: HOSPADM

## 2023-11-07 RX ORDER — FENTANYL CITRATE 50 UG/ML
25 INJECTION, SOLUTION INTRAMUSCULAR; INTRAVENOUS EVERY 5 MIN PRN
Status: DISCONTINUED | OUTPATIENT
Start: 2023-11-07 | End: 2023-11-07 | Stop reason: HOSPADM

## 2023-11-07 RX ORDER — GLYCOPYRROLATE 0.2 MG/ML
INJECTION INTRAMUSCULAR; INTRAVENOUS PRN
Status: DISCONTINUED | OUTPATIENT
Start: 2023-11-07 | End: 2023-11-07 | Stop reason: SDUPTHER

## 2023-11-07 RX ORDER — ROCURONIUM BROMIDE 10 MG/ML
INJECTION, SOLUTION INTRAVENOUS PRN
Status: DISCONTINUED | OUTPATIENT
Start: 2023-11-07 | End: 2023-11-07 | Stop reason: SDUPTHER

## 2023-11-07 RX ORDER — OXYCODONE HYDROCHLORIDE 5 MG/1
5 TABLET ORAL
Status: COMPLETED | OUTPATIENT
Start: 2023-11-07 | End: 2023-11-07

## 2023-11-07 RX ORDER — FENTANYL CITRATE 50 UG/ML
100 INJECTION, SOLUTION INTRAMUSCULAR; INTRAVENOUS
Status: DISCONTINUED | OUTPATIENT
Start: 2023-11-07 | End: 2023-11-07 | Stop reason: HOSPADM

## 2023-11-07 RX ORDER — SODIUM CHLORIDE, SODIUM LACTATE, POTASSIUM CHLORIDE, CALCIUM CHLORIDE 600; 310; 30; 20 MG/100ML; MG/100ML; MG/100ML; MG/100ML
INJECTION, SOLUTION INTRAVENOUS CONTINUOUS
Status: DISCONTINUED | OUTPATIENT
Start: 2023-11-07 | End: 2023-11-07 | Stop reason: HOSPADM

## 2023-11-07 RX ORDER — HYDROMORPHONE HYDROCHLORIDE 1 MG/ML
0.5 INJECTION, SOLUTION INTRAMUSCULAR; INTRAVENOUS; SUBCUTANEOUS EVERY 5 MIN PRN
Status: DISCONTINUED | OUTPATIENT
Start: 2023-11-07 | End: 2023-11-07 | Stop reason: HOSPADM

## 2023-11-07 RX ORDER — FENTANYL CITRATE 50 UG/ML
INJECTION, SOLUTION INTRAMUSCULAR; INTRAVENOUS PRN
Status: DISCONTINUED | OUTPATIENT
Start: 2023-11-07 | End: 2023-11-07 | Stop reason: SDUPTHER

## 2023-11-07 RX ORDER — TRAMADOL HYDROCHLORIDE 50 MG/1
50 TABLET ORAL
Status: COMPLETED | OUTPATIENT
Start: 2023-11-07 | End: 2023-11-07

## 2023-11-07 RX ADMIN — FENTANYL CITRATE 50 MCG: 50 INJECTION, SOLUTION INTRAMUSCULAR; INTRAVENOUS at 16:08

## 2023-11-07 RX ADMIN — FENTANYL CITRATE 50 MCG: 50 INJECTION, SOLUTION INTRAMUSCULAR; INTRAVENOUS at 13:37

## 2023-11-07 RX ADMIN — Medication 3 MG: at 14:56

## 2023-11-07 RX ADMIN — FENTANYL CITRATE 50 MCG: 50 INJECTION, SOLUTION INTRAMUSCULAR; INTRAVENOUS at 16:12

## 2023-11-07 RX ADMIN — GLYCOPYRROLATE 0.4 MG: 0.2 INJECTION INTRAMUSCULAR; INTRAVENOUS at 14:56

## 2023-11-07 RX ADMIN — LIDOCAINE HYDROCHLORIDE 50 MG: 20 INJECTION, SOLUTION EPIDURAL; INFILTRATION; INTRACAUDAL; PERINEURAL at 13:37

## 2023-11-07 RX ADMIN — SODIUM CHLORIDE: 9 INJECTION, SOLUTION INTRAVENOUS at 13:11

## 2023-11-07 RX ADMIN — HYDROMORPHONE HYDROCHLORIDE 1 MG: 2 INJECTION INTRAMUSCULAR; INTRAVENOUS; SUBCUTANEOUS at 14:08

## 2023-11-07 RX ADMIN — HYDROMORPHONE HYDROCHLORIDE 1 MG: 2 INJECTION INTRAMUSCULAR; INTRAVENOUS; SUBCUTANEOUS at 15:17

## 2023-11-07 RX ADMIN — OXYCODONE HYDROCHLORIDE 5 MG: 5 TABLET ORAL at 18:13

## 2023-11-07 RX ADMIN — FENTANYL CITRATE 50 MCG: 50 INJECTION, SOLUTION INTRAMUSCULAR; INTRAVENOUS at 14:03

## 2023-11-07 RX ADMIN — Medication 3 AMPULE: at 13:11

## 2023-11-07 RX ADMIN — ACETAMINOPHEN 1000 MG: 500 TABLET ORAL at 13:15

## 2023-11-07 RX ADMIN — HEPARIN SODIUM 5000 UNITS: 5000 INJECTION INTRAVENOUS; SUBCUTANEOUS at 14:18

## 2023-11-07 RX ADMIN — PROPOFOL 200 MG: 10 INJECTION, EMULSION INTRAVENOUS at 13:37

## 2023-11-07 RX ADMIN — HYDROCORTISONE SODIUM SUCCINATE 125 MG: 250 INJECTION, POWDER, FOR SOLUTION INTRAMUSCULAR; INTRAVENOUS at 13:45

## 2023-11-07 RX ADMIN — HYDROMORPHONE HYDROCHLORIDE 0.5 MG: 1 INJECTION, SOLUTION INTRAMUSCULAR; INTRAVENOUS; SUBCUTANEOUS at 17:07

## 2023-11-07 RX ADMIN — ONDANSETRON 4 MG: 2 INJECTION INTRAMUSCULAR; INTRAVENOUS at 13:45

## 2023-11-07 RX ADMIN — ROCURONIUM BROMIDE 40 MG: 10 INJECTION INTRAVENOUS at 13:38

## 2023-11-07 RX ADMIN — MIDAZOLAM HYDROCHLORIDE 2 MG: 1 INJECTION, SOLUTION INTRAMUSCULAR; INTRAVENOUS at 13:28

## 2023-11-07 RX ADMIN — TRAMADOL HYDROCHLORIDE 50 MG: 50 TABLET ORAL at 17:04

## 2023-11-07 RX ADMIN — HEPARIN SODIUM 1400 UNITS: 1000 INJECTION INTRAVENOUS; SUBCUTANEOUS at 18:17

## 2023-11-07 RX ADMIN — SODIUM CHLORIDE: 9 INJECTION, SOLUTION INTRAVENOUS at 14:19

## 2023-11-07 RX ADMIN — PROPOFOL 100 MG: 10 INJECTION, EMULSION INTRAVENOUS at 15:03

## 2023-11-07 RX ADMIN — WATER 2000 MG: 1 INJECTION INTRAMUSCULAR; INTRAVENOUS; SUBCUTANEOUS at 13:28

## 2023-11-07 RX ADMIN — DIPHENHYDRAMINE HYDROCHLORIDE 25 MG: 50 INJECTION INTRAMUSCULAR; INTRAVENOUS at 13:45

## 2023-11-07 ASSESSMENT — PAIN DESCRIPTION - ORIENTATION
ORIENTATION: RIGHT;UPPER
ORIENTATION: RIGHT
ORIENTATION: RIGHT;UPPER

## 2023-11-07 ASSESSMENT — PAIN DESCRIPTION - DESCRIPTORS
DESCRIPTORS: BURNING
DESCRIPTORS: BURNING
DESCRIPTORS: ACHING
DESCRIPTORS: ACHING;THROBBING

## 2023-11-07 ASSESSMENT — PAIN DESCRIPTION - LOCATION
LOCATION: ARM

## 2023-11-07 ASSESSMENT — PAIN SCALES - GENERAL
PAINLEVEL_OUTOF10: 8
PAINLEVEL_OUTOF10: 6
PAINLEVEL_OUTOF10: 8

## 2023-11-07 ASSESSMENT — PAIN - FUNCTIONAL ASSESSMENT: PAIN_FUNCTIONAL_ASSESSMENT: 0-10

## 2023-11-07 NOTE — ANESTHESIA POSTPROCEDURE EVALUATION
Department of Anesthesiology  Postprocedure Note    Patient: Rosy Martinez  MRN: [id number]  YOB: 1985  Date of evaluation: 11/7/2023      Procedure Summary     Date: 11/07/23 Room / Location: [facility] MAIN OR  / [facility] MAIN OR    Anesthesia Start: 1328 Anesthesia Stop: 7747    Procedure: OPEN THROMBECTOMY OF AV GRAFT RIGHT ARM (Right: Arm Upper) Diagnosis:       Clotted renal dialysis AV graft, sequela      (Clotted renal dialysis AV graft, sequela [T82.868S])    Providers: Frannie Hernandez MD Responsible Provider: Manjinder Frias MD    Anesthesia Type: General ASA Status: 3          Anesthesia Type: General    Rosalia Phase I: Rosalia Score: 10    Rosalia Phase II:        Anesthesia Post Evaluation    Patient location during evaluation: PACU  Patient participation: complete - patient participated  Level of consciousness: sleepy but conscious  Airway patency: patent  Nausea & Vomiting: no nausea and no vomiting  Complications: no  Cardiovascular status: hemodynamically stable  Respiratory status: acceptable and room air  Hydration status: stable  Multimodal analgesia pain management approach

## 2023-11-07 NOTE — PERIOP NOTE
1222 - PT DENIES FEVER, COLD, COUGH, SOB, N/V, DIARRHEA. .... PRE-OP TCHING DONE - PT VERBALIZES UNDERSTANDING. STRETCHER IN LOWEST POSITION, CB IN PLACE AND SR UP X2.

## 2023-11-07 NOTE — DISCHARGE INSTRUCTIONS
Call for appt at [facility] [address] [phone]   Patient Discharge Instructions    Eliseo Lindsay / [id number] : 1985    Admitted 2023 Discharged: 2023     What to do at Home  Recommended activity: Elevate arm   No needle sticks or BP checks in affected arm  Leave dressing for 72-96 hours. Graft ready to use for dialysis in am Weds    Information obtained by :  I understand that if any problems occur once I am at home I am to contact my physician. I understand and acknowledge receipt of the instructions indicated above.                                                                                                                                            R.N.'s Signature                                                                  Date/Time                                                                                                                                              Patient or Representative Signature                                                          Date/Time      Ranjeet Stallworth MD         DISCHARGE SUMMARY from Nurse    PATIENT INSTRUCTIONS:    After general anesthesia or intravenous sedation, for 24 hours or while taking prescription narcotics:    Have someone responsible help you with your care  Limit your activities  Do not drive and operate hazardous machinery  Do not make important personal, legal or business decisions  Do not drink alcoholic beverages  If you have not urinated within 8 hours after discharge, please contact your surgeon on call  Resume your medications unless otherwise instructed    From general anesthesia, intravenous sedation, or while taking prescription narcotics, you may experience:    Drowsiness, dizziness, sleepiness, or confusion  Difficulty remembering or delayed reaction times  Difficulty with your balance, especially while walking, move slowly and carefully, do not make sudden position changes  Difficulty

## 2023-11-07 NOTE — PERIOP NOTE
Irrisept Wound Debridement and Cleansing System  Ref: Luciana Silvestre: [id number] LOT: [id number] Expiration Date: 06/30/2026

## 2023-11-07 NOTE — BRIEF OP NOTE
Brief Postoperative Note      Patient: Lucia Becker  YOB: 1985  MRN: [id number]    Date of Procedure: 11/7/2023    Pre-Op Diagnosis Codes:     * Clotted renal dialysis AV graft, sequela [T82.868S]    Post-Op Diagnosis: Same       Proc: 1. Mechanical thrombectomy R upper arm AV graft            2. Completion arteriogram w/fluoro            3. Angioplasty/stent placement venous outflow (8x5 Viabahn)            4. Thrombectomy R brachial artery               Surgeon(s):  Ivis Maciel MD    Assistant:  Surgical Assistant: Efrain Mac    Anesthesia: Gen    Estimated Blood Loss (mL): Minimal    Complications: None    Specimens:   * No specimens in log *    Implants:  Implant Name Type Inv. Item Serial No.  Lot No. LRB No. Used Action   STENT PERIPH L50MM DIA8MM CATH L120CM 0.018IN FEM [place] ART [id number]  [employer]] - [id number] Peripheral stents STENT PERIPH L50MM DIA8MM CATH L120CM 0.018IN FEM [place] ART [id number]]  [employer]] [id number]  [employer]-WD N/A Right 1 Implanted         Findings: Good graft thrill. Palp R radial pulse.       Electronically signed by Anna Rollins MD on 11/7/2023 at 3:25 PM

## 2023-11-08 NOTE — OP NOTE
[facility]  OPERATIVE REPORT    Name:  Wing Cushing  MR#:  [id number]  :  1985  ACCOUNT #:  [de-identified]  DATE OF SERVICE:  2023    PREOPERATIVE DIAGNOSIS:  End-stage renal disease. POSTOPERATIVE DIAGNOSIS:  End-stage renal disease. PROCEDURE PERFORMED:  1. Mechanical catheter thrombectomy, right upper arm AV graft. 2.  Completion fistulogram with intraoperative fluoroscopy. 3.  Balloon angioplasty and covered stent placement venous outflow anastomosis (8 x 5 Viabahn)  4. Mechanical catheter thrombectomy, right brachial artery. SURGEON:  Susanne Yates MD    ASSISTANT:  ***    ANESTHESIA:  General.    COMPLICATIONS:  ***. SPECIMENS REMOVED:  ***. IMPLANTS:  ***. ESTIMATED BLOOD LOSS:  ***. INDICATIONS:  The patient is a 67-year-old gentleman who has a longstanding right upper arm prosthetic loop graft, which recently thrombosed. Attempts to thrombectomize it in the outpatient space were unsuccessful and there was evidence of some extrusion of thrombus into the brachial artery. He is therefore brought to the operating room for open thrombectomy with control and clearance of the brachial artery as well. PROCEDURE:  After obtaining informed consent, the patient was placed supine on the operating table. After adequate induction of general anesthesia, site and patient confirmation, administration of prophylactic antibiotics, the right arm was prepped and draped in sterile fashion. Incision was made in the axilla through the previous scar to expose both the arterial and venous anastomoses. The arterial anastomosis was easily identified and dissected free to allow a Silastic vessel loop control of the brachial artery proximal and distal to the takeoff of the graft. The graft was likewise dissected free and controlled. The venous end of the graft was controlled as well.   The patient was systemically heparinized and an oblique incision was made over the

## 2024-01-01 NOTE — PERIOP NOTES
7436 Patient very restless grabbing at right arm, rocking back and forth. PRN pain medications will be provided. 1011 Patient still grabbing at right arm and rocking back in forth in pain. Shivering at times. PRN pain medications provided as ordered. 1040 Vomiting thick clear colored emesis. Called Dr. Flip Benitez for a one time dose of Zofran. Ordered received. 1050 Resting with eyes closed. 1105 Easily aroused, alert and oriented x5. Requesting to be sent home now. Pain 5/10 but declines any medication at this time. Nausea improved. 1118 TRANSFER - OUT REPORT:    Verbal report given to [facility] RN(name) on Jackquline Apo  being transferred to Phase II(unit) for routine post - op       Report consisted of patients Situation, Background, Assessment and   Recommendations(SBAR). Information from the following report(s) SBAR, Kardex, ED Summary, OR Summary, Procedure Summary, Intake/Output, MAR, Accordion, Recent Results, Med Rec Status, Cardiac Rhythm NSR, Alarm Parameters , Pre Procedure Checklist, Procedure Verification and Quality Measures was reviewed with the receiving nurse. Opportunity for questions and clarification was provided. Patient transported with:   Registered Nurse     [phone] of pain 7/10 in right arm, requesting PRN pain medications. Provided as requested. Patient Education       Well Child Exam 2 Weeks   About this topic   Your baby's 2 week well child exam is a visit with the doctor to check your baby's health. The doctor measures your child's weight, height, and head size. The doctor plots these numbers on a growth curve. The growth curve gives a picture of your baby's growth at each visit. Your baby may have lost weight in the week after birth, but may be back to their birth weight at this visit. The doctor may listen to your baby's heart, lungs, and belly. The doctor will do a full exam of your baby from the head to the toes.  General   Growth and Development   Your doctor will ask you how your baby is developing. The doctor will focus on the skills that most children your child's age are expected to do. During the second week of your child's life, here are some things you can expect.  Movement - Your baby may:  Hold their arms and legs close to their body.  Be able to lift their head up for a short time.  Turn their head when you stroke your babys cheek.  Hold your finger when it is placed in their palm.  Hearing and seeing - Your baby will likely:  Be more alert and able to stay awake for short periods of time.  Enjoy hearing you read or sing to them.  Want to look at your face or a black and white pattern.  Still have their eyes cross or wander from time to time.  Feeding - Your baby needs:  Breast milk or formula for all their nutrition. Your baby will want to eat every 2 to 3 hours, or 8 to 12 times a day, based on if you are breast or bottle feeding. Look for signs your baby is hungry.  Do not use a microwave to heat a bottle.  Always hold your baby when feeding. Do not prop a bottle. Propping the bottle makes it easier for your baby to choke and to get ear infections.     Diapers - Your baby:  Will have 6 or more wet diapers each day.  May have 3 or more yellow seedy stools each day.  Sleep - Your child:  Sleeps for 16 to 18 hours of each day.  Should  Procedure:  SALPINGECTOMY, LAPAROSCOPIC (Bilateral: Pelvis)    Relevant Problems   HEMATOLOGY   (+) Anemia      PULMONARY   (+) Light cigarette smoker      Other   (+) Anesthesia        Physical Exam    Airway    Mallampati score: II  TM Distance: >3 FB  Neck ROM: full     Dental   No notable dental hx     Cardiovascular  Rhythm: regular, Rate: normal, Cardiovascular exam normal    Pulmonary  Pulmonary exam normal Breath sounds clear to auscultation,     Other Findings        Anesthesia Plan  ASA Score- 2     Anesthesia Type- general with ASA Monitors. Additional Monitors:   Airway Plan: ETT. Plan Factors-    Chart reviewed. Existing labs reviewed. Patient summary reviewed. Patient is a current smoker. Patient instructed to abstain from smoking on day of procedure. Patient did not smoke on day of surgery. Induction- intravenous. Postoperative Plan-     Informed Consent- Anesthetic plan and risks discussed with patient and spouse Sunshine Vigil). always sleep on the back, in your child's own bed, on a firm mattress.  Crying - Your baby:  Is trying to tell you something. Your baby may be hot, cold, wet, or hungry. They may also just want to be held. It is good to hold and soothe your baby when they cry. You cannot spoil a baby.  May have periods of time where they are more fussy.  May be calmed by gentle rocking or swaying. Never shake a baby.  Help for Parents   Play with your baby.  Talk or sing to your baby often. Let your baby look at your face.  Gently move your baby's arms and legs. Give your baby a gentle massage.  Use tummy time to help your baby grow strong neck muscles. Shake a small rattle to encourage your baby to turn their head to the side.     Here are some things you can do to help keep your baby safe and healthy.  Learn CPR and basic first aid. Learn how to take your baby's temperature.  Do not allow anyone to smoke in your home or around your baby. Second hand smoke can harm your baby.  Have the right size car seat for your baby and use it every time your baby is in the car. Your baby should be rear facing until 2 years of age. Check with a local car seat safety inspection station to be sure it is properly installed.  Always place your baby on the back for sleep. Keep soft bedding, bumpers, loose blankets, and toys out of your baby's bed.  Keep one hand on the baby whenever you are changing their diaper or clothes to prevent falls.  You can give your baby a tub bath after their umbilical cord has fallen off. Never leave your baby alone in the bath.  Here are some things parents need to think about.  Asking for help. Plan for others to help you so you can get some rest. It can be a stressful time after a baby is first born.  How to handle bouts of crying or colic. It is normal for your baby to have times when they are hard to console. You need a plan for what to do if you are frustrated because it is never OK to shake a baby.  Postpartum  depression. Many parents feel sad, tearful, guilty, or overwhelmed within a few days after their baby is born. For mothers, this can be due to her changing hormones. Fathers can have these feelings too though. Talk about your feelings with someone close to you. Try to get enough sleep. Take time to go outside or be with others. If you are having problems with this, talk with your doctor.  The next well child visit may be when your baby is 1 month old. At this visit your doctor may:  Do a full check-up on your baby.  Talk about how your baby is sleeping, if your baby has colic or long periods of crying, and how well you are coping with your baby.  When do I need to call the doctor?   Fever of 100.4°F (38°C) or higher.  Having a hard time breathing.  Doesnt have a wet diaper for more than 8 hours.  Problems eating or spits up a lot.  Legs and arms are very loose or floppy all the time.  Legs and arms are very stiff.  Won't stop crying.  Doesn't blink or startle with loud sounds.  Where can I learn more?   American Academy of Pediatrics  https://www.healthychildren.org/English/ages-stages/baby/Pages/Hearing-and-Making-Sounds.aspx   American Academy of Pediatrics  https://www.healthychildren.org/English/ages-stages/toddler/Pages/Milestones-During-The-First-2-Years.aspx   Centers for Disease Control and Prevention  https://www.cdc.gov/ncbddd/actearly/milestones/   Department of Health  https://www.vaccines.gov/who_and_when/infants_to_teens/child   Last Reviewed Date   2021-05-07  Consumer Information Use and Disclaimer   This information is not specific medical advice and does not replace information you receive from your health care provider. This is only a brief summary of general information. It does NOT include all information about conditions, illnesses, injuries, tests, procedures, treatments, therapies, discharge instructions or life-style choices that may apply to you. You must talk with your health care  provider for complete information about your health and treatment options. This information should not be used to decide whether or not to accept your health care providers advice, instructions or recommendations. Only your health care provider has the knowledge and training to provide advice that is right for you.  Copyright   Copyright © 2021 UpToDate, Inc. and its affiliates and/or licensors. All rights reserved.    Children under the age of 2 years will be restrained in a rear facing child safety seat.   If you have an active [facility] account, please look for your well child questionnaire to come to your [facility] account before your next well child visit.

## 2024-05-08 ENCOUNTER — HOSPITAL ENCOUNTER (OUTPATIENT)
Facility: HOSPITAL | Age: 39
Setting detail: SPECIMEN
Discharge: HOME | End: 2024-05-11

## 2024-05-08 LAB — POTASSIUM SERPL-SCNC: 5.1 MMOL/L (ref 3.5–5.1)

## 2024-05-08 PROCEDURE — 84132 ASSAY OF SERUM POTASSIUM: CPT

## 2024-05-08 PROCEDURE — 36415 COLL VENOUS BLD VENIPUNCTURE: CPT

## 2025-04-13 NOTE — ED PROVIDER NOTES
s this is a 41-year-old male with a history of end-stage renal disease who has just recently been placed on hemodialysis on Tuesdays Thursdays and Saturdays. His last dialysis was Saturday. He states he has been having pain in the right flank area for the last week which has extended somewhat across the back to the left side as well. There is some discomfort when he takes a deep breath. He was admitted on 28 August for his end-stage renal disease and placed on dialysis at that time. He also had acute pyelonephritis and was treated with IV antibiotics. His urine culture was negative. By renal ultrasound, the patient had cortical thinning of the kidneys bilaterally with severe chronic hydronephrosis bilaterally. He was noted to have a chemical pancreatitis without clinical symptoms. Patient states he has not had any fever or chill with this flank pain for the past week. He presents today because the pain kept him from sleeping last night. He has had no nausea or vomiting. There is some pain in the flank area with the end of urination. He has had no bowel difficulty. There is been no cough or congestion and no shortness of breath. Only pain in the right flank with deep inspiration. Patient denies any other acute symptoms at this time.            Past Medical History:   Diagnosis Date    Acute renal failure (ARF) ([address] 7/30/2015    Chronic kidney disease     pt on hemodialysis d/t reflux    Congenital hydroureteronephrosis     Crohn disease ([address]     DVT (deep venous thrombosis) (HCC)          ESRD (end stage renal disease) ([address]     on HD [id number]    Gastrointestinal disorder     Chron's    VUR (vesicoureteric reflux)        Past Surgical History:   Procedure Laterality Date    HX OTHER SURGICAL  Pt has a filter for DVT's    HX OTHER SURGICAL      Marquis placed 3 weeks ago    HX VASCULAR ACCESS  1/14/14    CREATION LEFT UPPER ARM ARTERIO VENOUS GRAFT   (7mm PTFE)         Family History: Problem Relation Age of Onset    Heart Disease Other     Kidney Disease Other         kidney stones    Diabetes Mother     Hypertension Mother        Social History     Socioeconomic History    Marital status: SINGLE     Spouse name: Not on file    Number of children: Not on file    Years of education: Not on file    Highest education level: Not on file   Occupational History    Not on file   Social Needs    Financial resource strain: Not on file    Food insecurity:     Worry: Not on file     Inability: Not on file    Transportation needs:     Medical: Not on file     Non-medical: Not on file   Tobacco Use    Smoking status: Former Smoker    Smokeless tobacco: Never Used    Tobacco comment: quit about a year ago    Substance and Sexual Activity    Alcohol use: No    Drug use: No    Sexual activity: Yes     Partners: Female   Lifestyle    Physical activity:     Days per week: Not on file     Minutes per session: Not on file    Stress: Not on file   Relationships    Social connections:     Talks on phone: Not on file     Gets together: Not on file     Attends [employer] service: Not on file     Active member of club or organization: Not on file     Attends meetings of clubs or organizations: Not on file     Relationship status: Not on file    Intimate partner violence:     Fear of current or ex partner: Not on file     Emotionally abused: Not on file     Physically abused: Not on file     Forced sexual activity: Not on file   Other Topics Concern    Not on file   Social History Narrative    Not on file         ALLERGIES: Codeine; Iodinated contrast media; and Shellfish derived    Review of Systems   Constitutional: Negative for activity change, appetite change and fatigue. HENT: Negative for ear pain, facial swelling, sore throat and trouble swallowing. Eyes: Negative for pain, discharge and visual disturbance. Respiratory: Negative for chest tightness, shortness of breath and wheezing. Cardiovascular: Negative for chest pain and palpitations. Gastrointestinal: Negative for abdominal pain, blood in stool, nausea and vomiting. Genitourinary: Positive for flank pain (Bilateral - L>R). Negative for difficulty urinating and hematuria. Musculoskeletal: Negative for arthralgias, joint swelling, myalgias and neck pain. Skin: Negative for color change and rash. Neurological: Negative for dizziness, weakness, numbness and headaches. Hematological: Negative for adenopathy. Does not bruise/bleed easily. Psychiatric/Behavioral: Negative for behavioral problems, confusion and sleep disturbance. All other systems reviewed and are negative. Vitals:    09/30/19 0647   BP: 121/81   Pulse: 61   Resp: 18   Temp: 98.7 °F (37.1 °C)   SpO2: 100%            Physical Exam   Constitutional: He is oriented to person, place, and time. He appears well-developed and well-nourished. He appears distressed. Patient appears to be in a moderate degree of distress with pain in the right and left flank areas posteriorly over the CVA areas. HENT:   Head: Normocephalic and atraumatic. Nose: Nose normal.   Mouth/Throat: Oropharynx is clear and moist.   Eyes: Pupils are equal, round, and reactive to light. Conjunctivae and EOM are normal. No scleral icterus. Neck: Normal range of motion. Neck supple. No JVD present. No tracheal deviation present. No thyromegaly present. No carotid bruits noted. Cardiovascular: Normal rate, regular rhythm, normal heart sounds and intact distal pulses. Exam reveals no gallop and no friction rub. No murmur heard. Pulmonary/Chest: Effort normal and breath sounds normal. No respiratory distress. He has no wheezes. He has no rales. He exhibits no tenderness. Abdominal: Soft. Bowel sounds are normal. He exhibits no distension and no mass. There is no tenderness. There is no rebound and no guarding.    No definitive abdominal pain is noted except perhaps mild discomfort in his right flank and right lower quadrant. No guarding is noted. Mild CVA tenderness bilaterally   Musculoskeletal: Normal range of motion. He exhibits no edema or tenderness. Lymphadenopathy:     He has no cervical adenopathy. Neurological: He is alert and oriented to person, place, and time. He has normal reflexes. No cranial nerve deficit. Coordination normal.   Skin: Skin is warm and dry. No rash noted. No erythema. Psychiatric: He has a normal mood and affect. His behavior is normal. Judgment and thought content normal.   Nursing note and vitals reviewed. MDM  Number of Diagnoses or Management Options     Amount and/or Complexity of Data Reviewed  Clinical lab tests: reviewed and ordered  Tests in the radiology section of CPT®: reviewed and ordered  Decide to obtain previous medical records or to obtain history from someone other than the patient: yes  Review and summarize past medical records: yes  Independent visualization of images, tracings, or specimens: yes    Risk of Complications, Morbidity, and/or Mortality  Presenting problems: high  Diagnostic procedures: high  Management options: high    Patient Progress  Patient progress: stable         Procedures    Given this patient's history of chronic bilateral hydronephrosis, recent diagnosis of Carl and end-stage renal disease, and the severity of his discomfort, the patient is treated with pain medications while obtaining lab and imaging. We will CT his abdomen at this time. We will also check his lipase. Hospitalist Patric for Admission  9:52 AM    ED Room Number: R31/R31  Patient Name and age:  Ganesh Padilla 36 y.o.  male  Working Diagnosis:   1. Urinary tract infection with hematuria, site unspecified    2. Hydronephrosis, unspecified hydronephrosis type    3.  Chronic renal failure, unspecified CKD stage      Readmission: yes  Isolation Requirements:  no  Recommended Level of Care:  med/surg  Code Status:  Full Code  Department:[facility] Adult ED - [phone]  Other:      10:07 AM  The patient states that his pain is similar to where it has been. I discussed all the findings with him and the need for admission. He agrees. Patient will be started on IV antibiotics and another round of pain medication.   The hospitalist is been consulted and will see for admission Clothing

## (undated) DEVICE — SPONGE HEMOSTAT CELLULS 4X8IN -- SURGICEL

## (undated) DEVICE — SPONGE GZ W4XL4IN COT 12 PLY TYP VII WVN C FLD DSGN

## (undated) DEVICE — SUTURE ETHLN SZ 2-0 L20IN NONABSORBABLE BLK LR L75MM 3/8 470G

## (undated) DEVICE — GLOVE ORANGE PI 8   MSG9080

## (undated) DEVICE — SOLUTION IRRIGATION H2O 0797305] ICU MEDICAL INC]

## (undated) DEVICE — AGENT HEMSTAT W4XL4IN OXIDIZED REGENERATED CELOS ABSRB SFT

## (undated) DEVICE — COVER,MAYO STAND,STERILE: Brand: MEDLINE

## (undated) DEVICE — (D)PREP SKN CHLRAPRP APPL 26ML -- CONVERT TO ITEM 371833

## (undated) DEVICE — SUTURE PERMAHAND SZ 2-0 L30IN NONABSORBABLE BLK SILK W/O A305H

## (undated) DEVICE — HANDLE LT SNAP ON ULT DURABLE LENS FOR TRUMPF ALC DISPOSABLE

## (undated) DEVICE — SYR 10ML LUER LOK 1/5ML GRAD --

## (undated) DEVICE — SYR 3ML LL TIP 1/10ML GRAD --

## (undated) DEVICE — SUTURE GORTX SZ 4-0 L24IN NONABSORBABLE L13MM PT-13 3/8 CIR 5K08A

## (undated) DEVICE — DRAIN SURG W10XL20CM SIL SMOOTH FLAT 3/4 PERF DBL WRP

## (undated) DEVICE — NEEDLE HYPO 18GA L1.5IN PNK S STL HUB POLYPR SHLD REG BVL

## (undated) DEVICE — SUTURE PDS II SZ 1 L27IN ABSRB VLT CT-1 L36MM 1/2 CIR Z341H

## (undated) DEVICE — APPLIER CLP L L13IN TI MULT RNG HNDL 20 CLP STR LIGACLP

## (undated) DEVICE — FEMALE LUER ADAPTER: Brand: ARGYLE

## (undated) DEVICE — GOWN,SIRUS,NONRNF,SETINSLV,2XL,18/CS: Brand: MEDLINE

## (undated) DEVICE — INFLATION DEVICE: Brand: ENCORE™ 26

## (undated) DEVICE — PAD,ABDOMINAL,5"X9",ST,LF,25/BX: Brand: MEDLINE INDUSTRIES, INC.

## (undated) DEVICE — C-ARM: Brand: UNBRANDED

## (undated) DEVICE — GOWN,SIRUS,POLYRNF,BRTHSLV,XL,30/CS: Brand: MEDLINE

## (undated) DEVICE — TUBING, SUCTION, 1/4" X 12', STRAIGHT: Brand: MEDLINE

## (undated) DEVICE — CONTAINER,SPECIMEN,4OZ,OR STRL: Brand: MEDLINE

## (undated) DEVICE — RELOAD STPL H1-2.5X45MM VASC THN TISS WHT 6 ROW B FRM SGL

## (undated) DEVICE — Device: Brand: 36" PLAIN BAR

## (undated) DEVICE — TAPE,CLOTH/SILK,CURAD,3"X10YD,LF,40/CS: Brand: CURAD

## (undated) DEVICE — X-RAY SPONGES,16 PLY: Brand: DERMACEA

## (undated) DEVICE — FOGARTY ARTERIAL EMBOLECTOMY CATHETER 5F 80CM: Brand: FOGARTY

## (undated) DEVICE — STAPLER SKIN H3.9MM WIRE DIA0.58MM CRWN 6.9MM 35 STPL ROT

## (undated) DEVICE — CUTTER ENDOSCP L340MM LIN ARTC SGL STROKE FIRING ENDOPATH

## (undated) DEVICE — 3M™ MEDIPORE™ H SOFT CLOTH SURGICAL TAPE 2864, 4 INCH X 10 YARD (10CM X 9,14M), 12 ROLLS/CASE: Brand: 3M™ MEDIPORE™

## (undated) DEVICE — RING BASIN GUIDEWIRE BOWL: Brand: MEDLINE INDUSTRIES, INC.

## (undated) DEVICE — SUTURE VCRL SZ 3-0 L27IN ABSRB UD L26MM SH 1/2 CIR J416H

## (undated) DEVICE — APPLIER CLP M/L SHFT DIA5MM 15 LIG LIGAMAX 5

## (undated) DEVICE — SOLUTION IV 500ML 0.9% SOD CHL PH 5 INJ USP VIAFLX PLAS

## (undated) DEVICE — COVER LT HNDL PLAS RIG 1 PER PK

## (undated) DEVICE — VISUALIZATION SYSTEM: Brand: CLEARIFY

## (undated) DEVICE — APPLICATOR ENDOSCP L34CM W/ S STL CANN PLAS OBT STYL FOR

## (undated) DEVICE — TOWEL SURG W17XL27IN STD BLU COT NONFENESTRATED PREWASHED

## (undated) DEVICE — PART NUMBER 108260, VTI 8 MHZ DISPOSABLE DOPPLER PROBE, STRAIGHT, BOX: Brand: VTI 8 MHZ DISPOSABLE DOPPLER PROBE, STRAIGHT, BOX

## (undated) DEVICE — SYRINGE MED 3ML CLR PLAS STD N CTRL LUERLOCK TIP DISP

## (undated) DEVICE — CANISTER, RIGID, 3000CC: Brand: MEDLINE INDUSTRIES, INC.

## (undated) DEVICE — KERLIX BANDAGE ROLL: Brand: KERLIX

## (undated) DEVICE — 3M™ TEGADERM™ TRANSPARENT FILM DRESSING FRAME STYLE, 1626W, 4 IN X 4-3/4 IN (10 CM X 12 CM), 50/CT 4CT/CASE: Brand: 3M™ TEGADERM™

## (undated) DEVICE — LAPAROTOMY-SFMC: Brand: MEDLINE INDUSTRIES, INC.

## (undated) DEVICE — STERILE POLYISOPRENE POWDER-FREE SURGICAL GLOVES: Brand: PROTEXIS

## (undated) DEVICE — SOLUTION IV 1000ML 0.9% SOD CHL

## (undated) DEVICE — Device

## (undated) DEVICE — SMARTGOWN SURGICAL GOWN, 3XL, LONG: Brand: CONVERTORS

## (undated) DEVICE — SUT PROL 6-0 24IN BV1 DA BLU --

## (undated) DEVICE — GLOVE ORANGE PI 8 1/2   MSG9085

## (undated) DEVICE — BLADE ASSEMB CLP HAIR FINE --

## (undated) DEVICE — SEALANT HEMOSTAT W/THROM 8ML -- SURGIFLO MATRIX

## (undated) DEVICE — MARKER SKIN GENTIAN VLT FN TIP W/ 6IN RUL L RESVR MED GRD

## (undated) DEVICE — CYSTO-SFMC: Brand: MEDLINE INDUSTRIES, INC.

## (undated) DEVICE — INTRODUCER SHTH 7FR CANN L5.5CM DIL TIP 35MM ORNG TUNGSTEN

## (undated) DEVICE — SYR LR LCK 1ML GRAD NSAF 30ML --

## (undated) DEVICE — SUTURE VCRL SZ 1 L27IN ABSRB VLT L36MM CT-1 1/2 CIR J341H

## (undated) DEVICE — SOLUTION IV 500ML 0.9% SOD CHL FLX CONT

## (undated) DEVICE — SUTURE PROL SZ 5-0 L24IN NONABSORBABLE BLU RB-2 L13IN 1/2 8554H

## (undated) DEVICE — BLADE ELECTRODE: Brand: EDGE

## (undated) DEVICE — SOLUTION IRRIG 1000ML STRL H2O USP PLAS POUR BTL

## (undated) DEVICE — GUIDEWIRE ENDOSCP L150CM DIA0.035IN TIP L15CM BENT PTFE

## (undated) DEVICE — SYRINGE ANGIO CNTRST DEL 20 CC POLYCARB LIGHT GRN MEDALLION

## (undated) DEVICE — LOOP,VESSEL,MAXI,BLUE,2/PK,STERILE: Brand: MEDLINE

## (undated) DEVICE — REM POLYHESIVE ADULT PATIENT RETURN ELECTRODE: Brand: VALLEYLAB

## (undated) DEVICE — INTENDED FOR TISSUE SEPARATION, AND OTHER PROCEDURES THAT REQUIRE A SHARP SURGICAL BLADE TO PUNCTURE OR CUT.: Brand: BARD-PARKER ® CARBON RIB-BACK BLADES

## (undated) DEVICE — Z DISCONTINUED USE 2131664 WIPE INSTR W3XL3IN NONLINTING

## (undated) DEVICE — SUT CHRMC 3-0 27IN SH BRN --

## (undated) DEVICE — SOLUTION IV 1000ML 0.9% SOD CHL PH 5 INJ USP VIAFLX PLAS

## (undated) DEVICE — ACCESS PLATFORM FOR MINIMALLY INVASIVE SURGERY.: Brand: GELPORT® LAPAROSCOPIC  SYSTEM

## (undated) DEVICE — 4-PORT MANIFOLD: Brand: NEPTUNE 2

## (undated) DEVICE — SUTURE CHROMIC GUT SZ 0 L27IN ABSRB BRN L40MM CT 1/2 CIR 802H

## (undated) DEVICE — DEVON™ KNEE AND BODY STRAP 60" X 3" (1.5 M X 7.6 CM): Brand: DEVON

## (undated) DEVICE — TOTAL TRAY, 16FR 10ML SIL FOLEY, URN: Brand: MEDLINE

## (undated) DEVICE — LABEL MED CARD MRMC STRL

## (undated) DEVICE — SUTURE VCRL SZ 0 L36IN ABSRB UD L36MM CT-1 1/2 CIR J946H

## (undated) DEVICE — CURVED, LARGE JAW, OPEN SEALER/DIVIDER NANO-COATED: Brand: LIGASURE IMPACT

## (undated) DEVICE — DRAPE FLD WRM W44XL66IN C6L FOR INTRATEMP SYS THERMABASIN

## (undated) DEVICE — YANKAUER,POOLE TIP,STERILE,50/CS: Brand: MEDLINE

## (undated) DEVICE — SPONGE: SPECIALTY PEANUT XR 100/CS: Brand: MEDICAL ACTION INDUSTRIES

## (undated) DEVICE — SUTURE PERMAHAND SZ 0 L30IN NONABSORBABLE BLK SILK BRAID A306H

## (undated) DEVICE — LAPAROSCOPIC TROCAR SLEEVE/SINGLE USE: Brand: KII® SLEEVE

## (undated) DEVICE — SUTURE PDS II SZ 1 L96IN ABSRB VLT XLH L70MM 1/2 CIR Z881G

## (undated) DEVICE — GLOVE SURG SZ 8 CRM LTX FREE POLYISOPRENE POLYMER BEAD ANTI

## (undated) DEVICE — PROBE VASC 8MHZ WTRPRF

## (undated) DEVICE — SUT ETHLN 4-0 18IN PS2 BLK --

## (undated) DEVICE — RADIFOCUS GLIDEWIRE: Brand: GLIDEWIRE

## (undated) DEVICE — PACK,LAPAROTOMY,2 REINFORCED GOWNS: Brand: MEDLINE

## (undated) DEVICE — KIT SURG PREP POVIDONE IOD PRESATURATED PAINT WET FOR UNIV

## (undated) DEVICE — CLIP INT L POLYMER LOK LIG HEM O LOK

## (undated) DEVICE — VESSEL LOOPS,MINI, RED: Brand: DEVON

## (undated) DEVICE — SUTURE ETHLN SZ 4-0 L18IN NONABSORBABLE BLK L19MM PS-2 3/8 1667H

## (undated) DEVICE — SHEAR RMFG HARMONIC FOCUS 9CM -- OEM ITEM L#322125

## (undated) DEVICE — SUTURE PROL SZ 6-0 L24IN NONABSORBABLE BLU L9.3MM BV-1 3/8 8805H

## (undated) DEVICE — GAUZE,SPONGE,2"X2",8PLY,STERILE,LF,2'S: Brand: MEDLINE

## (undated) DEVICE — SEALANT FIBRIN 10 CC FRZN PRE FILLED SYR TISSEEL

## (undated) DEVICE — SPONGE DRAIN NONWOVEN 4X4IN -- 2/PK

## (undated) DEVICE — SUT ETHLN 2-0 18IN FS BLK --

## (undated) DEVICE — MAGNETIC INSTR DRAPE 20X16: Brand: MEDLINE INDUSTRIES, INC.

## (undated) DEVICE — CATHETER ANGIO 5FR L65CM 0.038IN BERN SEL SFT RADPQ TIP HI

## (undated) DEVICE — AV FISTULA - MRMC: Brand: MEDLINE INDUSTRIES, INC.

## (undated) DEVICE — INFECTION CONTROL KIT SYS

## (undated) DEVICE — ROCKER SWITCH PENCIL BLADE ELECTRODE, HOLSTER: Brand: EDGE

## (undated) DEVICE — OPEN-END URETERAL CATHETER: Brand: COOK